# Patient Record
Sex: FEMALE | Race: WHITE | NOT HISPANIC OR LATINO
[De-identification: names, ages, dates, MRNs, and addresses within clinical notes are randomized per-mention and may not be internally consistent; named-entity substitution may affect disease eponyms.]

---

## 2021-01-07 ENCOUNTER — APPOINTMENT (OUTPATIENT)
Dept: SURGERY | Facility: CLINIC | Age: 71
End: 2021-01-07
Payer: MEDICARE

## 2021-01-07 VITALS
OXYGEN SATURATION: 96 % | SYSTOLIC BLOOD PRESSURE: 114 MMHG | RESPIRATION RATE: 14 BRPM | TEMPERATURE: 98.2 F | DIASTOLIC BLOOD PRESSURE: 78 MMHG | HEART RATE: 87 BPM

## 2021-01-07 PROCEDURE — 99072 ADDL SUPL MATRL&STAF TM PHE: CPT

## 2021-01-07 PROCEDURE — 99204 OFFICE O/P NEW MOD 45 MIN: CPT

## 2021-01-07 NOTE — ASSESSMENT
[FreeTextEntry1] : Ms. HURT is a 70 year old woman with admission in December 2020 to [place] for cholecystitis s/p cholecystostomy tube placement ~4 weeks ago. We discussed the risks/benefits of laparoscopic cholecystectomy. We also discussed, at length, the nonoperative management of cholecystitis and the procedure for possibly removing a cholecystostomy tube at least 6-8 weeks after placement. We discussed the importance of cardiology evaluation.

## 2021-01-07 NOTE — PLAN
[FreeTextEntry1] : No emergent surgical intervention indicated\par Cardiology referral provided\par Pt to return to office after cardiology assessment

## 2021-01-07 NOTE — HISTORY OF PRESENT ILLNESS
[de-identified] : Ms. HURT is a 70 year old woman with admission in December 2020 to [place] for cholecystitis s/p cholecystostomy tube placement ~4 weeks ago. The patient reports that she presented to the hospital due to severe pain, was diagnosed with a gallbladder infection, and a tube was placed. Per patient, she was deemed too high risk for surgery due to history of diastolic heart failure. Pt denies pain since discharge. Denies pain today. Denies recent fever/chills/nausea/emesis. Tolerating diet. \par \par Pt does not have a cardiologist. Denies recent cardiology evaluation.

## 2021-01-07 NOTE — PHYSICAL EXAM
[No Rash or Lesion] : No rash or lesion [Alert] : alert [Oriented to Person] : oriented to person [Oriented to Place] : oriented to place [Oriented to Time] : oriented to time [Calm] : calm [JVD] : no jugular venous distention  [de-identified] : No acute distress [de-identified] : No respiratory distress [de-identified] : Regular rate [de-identified] : soft, nontender. no rebound or guarding. cholecystostomy tube in place with bilious fluid in bag, insertion site clean [de-identified] : wheelchair bound

## 2021-01-13 DIAGNOSIS — Z86.39 PERSONAL HISTORY OF OTHER ENDOCRINE, NUTRITIONAL AND METABOLIC DISEASE: ICD-10-CM

## 2021-01-13 DIAGNOSIS — Z86.73 PERSONAL HISTORY OF TRANSIENT ISCHEMIC ATTACK (TIA), AND CEREBRAL INFARCTION W/OUT RESIDUAL DEFICITS: ICD-10-CM

## 2021-01-13 DIAGNOSIS — Z86.79 PERSONAL HISTORY OF OTHER DISEASES OF THE CIRCULATORY SYSTEM: ICD-10-CM

## 2021-01-13 DIAGNOSIS — Z87.09 PERSONAL HISTORY OF OTHER DISEASES OF THE RESPIRATORY SYSTEM: ICD-10-CM

## 2021-01-13 DIAGNOSIS — Z82.49 FAMILY HISTORY OF ISCHEMIC HEART DISEASE AND OTHER DISEASES OF THE CIRCULATORY SYSTEM: ICD-10-CM

## 2021-01-13 DIAGNOSIS — Z83.49 FAMILY HISTORY OF OTHER ENDOCRINE, NUTRITIONAL AND METABOLIC DISEASES: ICD-10-CM

## 2021-01-13 DIAGNOSIS — K81.9 CHOLECYSTITIS, UNSPECIFIED: ICD-10-CM

## 2021-01-13 DIAGNOSIS — Z80.8 FAMILY HISTORY OF MALIGNANT NEOPLASM OF OTHER ORGANS OR SYSTEMS: ICD-10-CM

## 2021-01-13 DIAGNOSIS — Z82.3 FAMILY HISTORY OF STROKE: ICD-10-CM

## 2021-01-13 DIAGNOSIS — Z78.9 OTHER SPECIFIED HEALTH STATUS: ICD-10-CM

## 2021-01-14 DIAGNOSIS — G43.909 MIGRAINE, UNSPECIFIED, NOT INTRACTABLE, W/OUT STATUS MIGRAINOSUS: ICD-10-CM

## 2021-01-14 DIAGNOSIS — Z78.9 OTHER SPECIFIED HEALTH STATUS: ICD-10-CM

## 2021-01-15 ENCOUNTER — APPOINTMENT (OUTPATIENT)
Dept: OBGYN | Facility: CLINIC | Age: 71
End: 2021-01-15
Payer: MEDICARE

## 2021-01-15 VITALS — WEIGHT: 172 LBS | BODY MASS INDEX: 33.77 KG/M2 | RESPIRATION RATE: 18 BRPM | OXYGEN SATURATION: 98 % | HEIGHT: 60 IN

## 2021-01-15 PROCEDURE — 99204 OFFICE O/P NEW MOD 45 MIN: CPT

## 2021-01-15 PROCEDURE — 99072 ADDL SUPL MATRL&STAF TM PHE: CPT

## 2021-01-15 NOTE — PLAN
[FreeTextEntry1] : d/w pt and daughter findings and that this is cancer until otherwise ruled out. advised that the most prudent next step would be to see gynonc. will make appointment. pts daughter stated that she was told at [facility] that there is nothing to worry about

## 2021-01-15 NOTE — PHYSICAL EXAM
[Appropriately responsive] : appropriately responsive [Alert] : alert [No Acute Distress] : no acute distress [No Lymphadenopathy] : no lymphadenopathy [Regular Rate Rhythm] : regular rate rhythm [No Murmurs] : no murmurs [Clear to Auscultation B/L] : clear to auscultation bilaterally [Soft] : soft [Non-tender] : non-tender [Non-distended] : non-distended [No HSM] : No HSM [No Lesions] : no lesions [No Mass] : no mass [Oriented x3] : oriented x3 [FreeTextEntry7] : gb drain

## 2021-01-15 NOTE — HISTORY OF PRESENT ILLNESS
[FreeTextEntry1] : pt presents after being worked up at [facility] for pmb. pt had emb which showed predominantly clot with very minute fragments of benign endometrium with breakdown. ct shows an enlarged uterus with an irregularly shaped mass. sono showed endometrium measuring 13mm

## 2021-01-16 ENCOUNTER — NON-APPOINTMENT (OUTPATIENT)
Age: 71
End: 2021-01-16

## 2021-01-19 ENCOUNTER — NON-APPOINTMENT (OUTPATIENT)
Age: 71
End: 2021-01-19

## 2021-01-21 ENCOUNTER — NON-APPOINTMENT (OUTPATIENT)
Age: 71
End: 2021-01-21

## 2021-01-21 ENCOUNTER — APPOINTMENT (OUTPATIENT)
Dept: GYNECOLOGIC ONCOLOGY | Facility: CLINIC | Age: 71
End: 2021-01-21
Payer: MEDICARE

## 2021-01-21 DIAGNOSIS — Z80.3 FAMILY HISTORY OF MALIGNANT NEOPLASM OF BREAST: ICD-10-CM

## 2021-01-21 DIAGNOSIS — Z80.0 FAMILY HISTORY OF MALIGNANT NEOPLASM OF DIGESTIVE ORGANS: ICD-10-CM

## 2021-01-21 PROCEDURE — 99072 ADDL SUPL MATRL&STAF TM PHE: CPT

## 2021-01-21 PROCEDURE — 99204 OFFICE O/P NEW MOD 45 MIN: CPT | Mod: 25

## 2021-01-21 PROCEDURE — 76857 US EXAM PELVIC LIMITED: CPT | Mod: 59

## 2021-01-21 PROCEDURE — 76830 TRANSVAGINAL US NON-OB: CPT | Mod: 59

## 2021-01-21 PROCEDURE — 58100 BIOPSY OF UTERUS LINING: CPT | Mod: 59

## 2021-01-21 NOTE — ASSESSMENT
[FreeTextEntry1] : 71yo female with multiple comorbidities, obesity with PMB, thickened endometrial lining and recent benign endometrial biopsy. Endometrial biopsy repeated today. Discussed with patient and daughter that I suspect an endometrial cancer and due to her comorbidities, she is not a good surgical candidate. Will start patient on Megace 40mg 2tabs BID. Discussed the need for possible radiation depending on biopsy results and response to Megace.

## 2021-01-21 NOTE — END OF VISIT
[FreeTextEntry3] : Written by Cindi DIETZ, acting as a scribe for Dr. Esme Saeed.\par This note accurately reflects the work and decisions made by me.\par

## 2021-01-21 NOTE — PHYSICAL EXAM
[Capable of only limited self care, confined to bed or chair more than 50% of waking hours] : Status 3- Capable of only limited self care, confined to bed or chair more than 50% of waking hours [Abnormal] : Abdomen: Abnormal [Obese] : obese [Normal] : Uterus: Normal size, no tenderness, no masses [de-identified] : edema of LE's -chronic  [de-identified] : moderate blood  [de-identified] : Patient was interviewed and examined with chaperone present. Name of chaperone: Cindi Travis

## 2021-01-21 NOTE — HISTORY OF PRESENT ILLNESS
[FreeTextEntry1] : This 71yo ,  x 4 LMP at 40 referred by Dr. Pete for PMB. Pt was admitted to [facility] hospital twice in Dec 2020 for n/v, abdominal distension and was found to have cholecystitis s/p IV antbiotics and drain placement with IR. She is following up with general surgery. During her admission, she developed vaginal bleeding and a pelvic US on 20 is limited but showed a thickened endometrial lining of 13mm, 10.9cm AV uterus. Ctscan on 20 showed enlarged uterus compressing the rectum, with an irregularly shaped enhancing mass between the distal vagina/cervix and uterus measuring 4.6 x 3.5cm with scattered cystic areas, no lymphadenopathy.  She was given Megace 40mg to control the bleeding while hospitalized but is no longer taking it. Endometrial biopsy on 20 revealed predominantly clot with very minute fragments of benign endometrium with breakdown. Pt reports the vag bleeding began in  intermittently and now is persistent, going through approx 5 pads a day. Admits to cramping. She is anemic.  \par \par PMHx of HTN/HLD, obesity, CVA with right sided weakness, carotid stenosis, diastolic CHF. \par \par Patient lives at home with her daughter and  and is mostly wheelchair bound. She Is able to walk slowly with a walker but with great difficulty. Most of history obtained from daughter and records.  \par \par Pap smear-5-10 years ago-reports wnl \par [id number] wnl \par [id number] wnl \par DEXA-few years ago-wnl \par

## 2021-01-21 NOTE — CHIEF COMPLAINT
[FreeTextEntry1] : [facility]\par \par [facility] [phone] at [address] [place] [id number]\par

## 2021-01-21 NOTE — PROCEDURE
[Endometrial Biopsy] : an endometrial biopsy [Postmenopausal Bleeding] : postmenopausal bleeding [Thickened Endometrium] : thickened endometrium [Patient] : the patient [Yes] : the specimen was sent to pathology [Verbal Consent] : verbal consent was obtained prior to the procedure and is detailed in the patient's record [No Complications] : none [Tolerated Well] : the patient tolerated the procedure well

## 2021-02-05 ENCOUNTER — APPOINTMENT (OUTPATIENT)
Dept: GYNECOLOGIC ONCOLOGY | Facility: CLINIC | Age: 71
End: 2021-02-05
Payer: MEDICARE

## 2021-02-05 LAB — CORE LAB BIOPSY: NORMAL

## 2021-02-05 PROCEDURE — 99441: CPT | Mod: 95

## 2021-02-05 NOTE — ASSESSMENT
[FreeTextEntry1] : Pt is a 69 yo with multiple comorbidities and poor functional status (ECOG3) with PMB and uterine/cervical mass. Physical exam with no evidence of cervical mass. Concern for endometrial cancer, but endometrial biopsy negative for evidence of malignancy. She was started on empiric Megace 40 mg PO BID with patient satisfied. She has had no further bleeding and only some cramping. We will continue current management and re-evaluate in 3 months or prior if she has any concerns.

## 2021-02-05 NOTE — END OF VISIT
[FreeTextEntry3] : follow up in 3 months or prior if needed for bleeding [Time Spent: ___ minutes] : I have spent [unfilled] minutes of time on the encounter.

## 2021-02-05 NOTE — HISTORY OF PRESENT ILLNESS
[Home] : at home, [unfilled] , at the time of the visit. [Other Location: e.g. Home (Enter Location, City,State)___] : at [unfilled] [Verbal consent obtained from patient] : the patient, [unfilled] [FreeTextEntry1] : Pt is a 71 yo with multiple co-morbidities making her a poor surgical candidate. She had PMB and a CT scan showing an anelarged uterus compressing the rectum with irregularly shaped mass in vagina/cervix. The patient is wheel chair bound with limited mobility and exam in office with normal cervix externally. Endometrial biopsy was attempted with scant tissue and blood. She was also started on Megace 40 mg PO BID for presummed hyperplasia/malignancy given that we would not recommend definitive surgical management. She presents for follow up on endometrial biopsy and discussion of management.\par \par She reports no further bleeding. She has some cramping with Megace but she is overall very satisfied. No nausea/vomiting, no fevers, chills.

## 2021-02-09 ENCOUNTER — APPOINTMENT (OUTPATIENT)
Dept: INTERVENTIONAL RADIOLOGY/VASCULAR | Facility: CLINIC | Age: 71
End: 2021-02-09
Payer: MEDICARE

## 2021-03-02 ENCOUNTER — APPOINTMENT (OUTPATIENT)
Dept: INTERVENTIONAL RADIOLOGY/VASCULAR | Facility: CLINIC | Age: 71
End: 2021-03-02
Payer: MEDICARE

## 2021-03-02 ENCOUNTER — OUTPATIENT (OUTPATIENT)
Dept: OUTPATIENT SERVICES | Facility: HOSPITAL | Age: 71
LOS: 1 days | End: 2021-03-02

## 2021-03-02 ENCOUNTER — RESULT REVIEW (OUTPATIENT)
Age: 71
End: 2021-03-02

## 2021-03-02 DIAGNOSIS — K81.9 CHOLECYSTITIS, UNSPECIFIED: ICD-10-CM

## 2021-03-02 PROCEDURE — 47531 INJECTION FOR CHOLANGIOGRAM: CPT

## 2021-03-05 ENCOUNTER — NON-APPOINTMENT (OUTPATIENT)
Age: 71
End: 2021-03-05

## 2021-03-05 ENCOUNTER — APPOINTMENT (OUTPATIENT)
Dept: CARDIOLOGY | Facility: CLINIC | Age: 71
End: 2021-03-05
Payer: MEDICARE

## 2021-03-05 VITALS
HEART RATE: 105 BPM | SYSTOLIC BLOOD PRESSURE: 112 MMHG | OXYGEN SATURATION: 98 % | WEIGHT: 175 LBS | BODY MASS INDEX: 34.36 KG/M2 | DIASTOLIC BLOOD PRESSURE: 72 MMHG | TEMPERATURE: 98.6 F | HEIGHT: 60 IN

## 2021-03-05 VITALS — SYSTOLIC BLOOD PRESSURE: 118 MMHG | DIASTOLIC BLOOD PRESSURE: 70 MMHG

## 2021-03-05 DIAGNOSIS — Z87.898 PERSONAL HISTORY OF OTHER SPECIFIED CONDITIONS: ICD-10-CM

## 2021-03-05 DIAGNOSIS — Z01.810 ENCOUNTER FOR PREPROCEDURAL CARDIOVASCULAR EXAMINATION: ICD-10-CM

## 2021-03-05 PROCEDURE — 99072 ADDL SUPL MATRL&STAF TM PHE: CPT

## 2021-03-05 PROCEDURE — 99204 OFFICE O/P NEW MOD 45 MIN: CPT

## 2021-03-05 PROCEDURE — 93000 ELECTROCARDIOGRAM COMPLETE: CPT

## 2021-03-05 RX ORDER — ADHESIVE TAPE 3"X 2.3 YD
50 MCG TAPE, NON-MEDICATED TOPICAL
Refills: 0 | Status: ACTIVE | COMMUNITY

## 2021-03-05 RX ORDER — ALBUTEROL 90 MCG
AEROSOL (GRAM) INHALATION
Refills: 0 | Status: ACTIVE | COMMUNITY

## 2021-03-05 NOTE — PHYSICAL EXAM
[General Appearance - Well Developed] : well developed [General Appearance - Well Nourished] : well nourished [Normal Conjunctiva] : the conjunctiva exhibited no abnormalities [Heart Rate And Rhythm] : heart rate and rhythm were normal [Heart Sounds] : normal S1 and S2 [Murmurs] : no murmurs present [Respiration, Rhythm And Depth] : normal respiratory rhythm and effort [Auscultation Breath Sounds / Voice Sounds] : lungs were clear to auscultation bilaterally [Bowel Sounds] : normal bowel sounds [Abdomen Soft] : soft [FreeTextEntry1] : Used Wheel chair [Cyanosis, Localized] : no localized cyanosis [] : no rash

## 2021-03-05 NOTE — ASSESSMENT
[FreeTextEntry1] : Sinus  Rhythm \par -Prominent R(V1) -nonspecific. \par  -Nonspecific ST depression   +   Negative T-waves. \par \par ABNORMAL \par \par \par Assessment:\par 1.  Exertional dyspnea\par 2.  Chest pain\par 3.  History of PAF\par 4.  History of a stroke with residual right-sided weakness\par 5.  History of diastolic CHF\par 6.  Hypertension/hyperlipidemia and other problems as noted\par 7.  Gallbladder disease-needs preoperative cardiac assessment\par \par Assessment:\par 1.  Echocardiogram\par 2.  Pharmacological nuclear stress test\par 3.  Event monitor\par 4.  Follow-up after testing\par \par \par Patient's perioperative cardiac risk assessment to be addressed after the echocardiogram and a stress test.\par \par

## 2021-03-05 NOTE — HISTORY OF PRESENT ILLNESS
[FreeTextEntry1] : Patient is a 70-year-old  female with a history of diastolic CHF, hypertension, hyperlipidemia, obesity, history of poliomyelitis history of a stroke and she has a uterine mass.  Patient has ongoing gallbladder problem.  Patient was admitted to [facility] in December 2020.  Patient has not seen a cardiologist.  Patient has a history of atrial fibrillation but she is not any oral anticoagulation.  Patient has been on Plavix since she had a stroke.  Patient's stroke has affected the right side of the body which is also the same side that she had polio.\par \par Patient presents for a preoperative cardiac assessment for abdominal surgery for gallbladder problem.  Patient has exertional dyspnea with less than 10 steps.  Patient is confined to wheelchair.  Patient also complains of chest tightness in the center of the chest.  Patient is having shortness of breath and chest pain for several months.  Patient denies orthopnea, PND or leg edema.  Patient denies any palpitations.\par \par \par Patient has no prior history of CAD or myocardial infarction.  No history of prior cardiac work-up like a stress test or a cardiac catheterization.  At this time I do not have any of her [facility] records.

## 2021-04-01 ENCOUNTER — APPOINTMENT (OUTPATIENT)
Dept: CARDIOLOGY | Facility: CLINIC | Age: 71
End: 2021-04-01
Payer: MEDICARE

## 2021-04-01 PROCEDURE — 93306 TTE W/DOPPLER COMPLETE: CPT

## 2021-04-01 PROCEDURE — 93015 CV STRESS TEST SUPVJ I&R: CPT

## 2021-04-01 PROCEDURE — 78452 HT MUSCLE IMAGE SPECT MULT: CPT

## 2021-04-01 PROCEDURE — 99072 ADDL SUPL MATRL&STAF TM PHE: CPT

## 2021-04-01 PROCEDURE — A9500: CPT

## 2021-04-05 ENCOUNTER — APPOINTMENT (OUTPATIENT)
Dept: CARDIOLOGY | Facility: CLINIC | Age: 71
End: 2021-04-05
Payer: MEDICARE

## 2021-04-05 ENCOUNTER — APPOINTMENT (OUTPATIENT)
Dept: PULMONOLOGY | Facility: CLINIC | Age: 71
End: 2021-04-05
Payer: MEDICARE

## 2021-04-05 VITALS
HEIGHT: 60 IN | TEMPERATURE: 97.9 F | HEART RATE: 89 BPM | RESPIRATION RATE: 16 BRPM | SYSTOLIC BLOOD PRESSURE: 140 MMHG | DIASTOLIC BLOOD PRESSURE: 83 MMHG | WEIGHT: 179 LBS | OXYGEN SATURATION: 98 % | BODY MASS INDEX: 35.14 KG/M2

## 2021-04-05 VITALS
HEART RATE: 85 BPM | SYSTOLIC BLOOD PRESSURE: 140 MMHG | OXYGEN SATURATION: 95 % | TEMPERATURE: 97.4 F | DIASTOLIC BLOOD PRESSURE: 70 MMHG

## 2021-04-05 DIAGNOSIS — G47.33 OBSTRUCTIVE SLEEP APNEA (ADULT) (PEDIATRIC): ICD-10-CM

## 2021-04-05 DIAGNOSIS — R07.89 OTHER CHEST PAIN: ICD-10-CM

## 2021-04-05 PROCEDURE — 99072 ADDL SUPL MATRL&STAF TM PHE: CPT

## 2021-04-05 PROCEDURE — 99204 OFFICE O/P NEW MOD 45 MIN: CPT

## 2021-04-05 PROCEDURE — 99214 OFFICE O/P EST MOD 30 MIN: CPT

## 2021-04-05 NOTE — REASON FOR VISIT
[Follow-Up - Clinic] : a clinic follow-up of [Chest Pain] : chest pain [Dyspnea] : dyspnea [Family Member] : family member

## 2021-04-08 ENCOUNTER — APPOINTMENT (OUTPATIENT)
Dept: SURGERY | Facility: CLINIC | Age: 71
End: 2021-04-08
Payer: MEDICARE

## 2021-04-08 VITALS
SYSTOLIC BLOOD PRESSURE: 111 MMHG | DIASTOLIC BLOOD PRESSURE: 69 MMHG | OXYGEN SATURATION: 95 % | HEART RATE: 86 BPM | TEMPERATURE: 97.4 F

## 2021-04-08 PROCEDURE — 99214 OFFICE O/P EST MOD 30 MIN: CPT

## 2021-04-08 PROCEDURE — 99072 ADDL SUPL MATRL&STAF TM PHE: CPT

## 2021-04-09 ENCOUNTER — INPATIENT (INPATIENT)
Facility: HOSPITAL | Age: 71
LOS: 1 days | Discharge: HOME | DRG: 920 | End: 2021-04-11
Attending: SURGERY | Admitting: SURGERY
Payer: MEDICARE

## 2021-04-09 ENCOUNTER — NON-APPOINTMENT (OUTPATIENT)
Age: 71
End: 2021-04-09

## 2021-04-09 VITALS
HEART RATE: 102 BPM | DIASTOLIC BLOOD PRESSURE: 100 MMHG | WEIGHT: 179.02 LBS | SYSTOLIC BLOOD PRESSURE: 202 MMHG | RESPIRATION RATE: 20 BRPM | TEMPERATURE: 98 F | OXYGEN SATURATION: 99 % | HEIGHT: 62 IN

## 2021-04-09 DIAGNOSIS — K82.3 FISTULA OF GALLBLADDER: ICD-10-CM

## 2021-04-09 LAB
ALBUMIN SERPL ELPH-MCNC: 3.9 G/DL — SIGNIFICANT CHANGE UP (ref 3.3–5.2)
ALP SERPL-CCNC: 117 U/L — SIGNIFICANT CHANGE UP (ref 40–120)
ALT FLD-CCNC: 12 U/L — SIGNIFICANT CHANGE UP
ANION GAP SERPL CALC-SCNC: 15 MMOL/L — SIGNIFICANT CHANGE UP (ref 5–17)
APTT BLD: 31 SEC — SIGNIFICANT CHANGE UP (ref 27.5–35.5)
AST SERPL-CCNC: 14 U/L — SIGNIFICANT CHANGE UP
BASOPHILS # BLD AUTO: 0.02 K/UL — SIGNIFICANT CHANGE UP (ref 0–0.2)
BASOPHILS NFR BLD AUTO: 0.1 % — SIGNIFICANT CHANGE UP (ref 0–2)
BILIRUB SERPL-MCNC: 0.4 MG/DL — SIGNIFICANT CHANGE UP (ref 0.4–2)
BLD GP AB SCN SERPL QL: SIGNIFICANT CHANGE UP
BUN SERPL-MCNC: 10 MG/DL — SIGNIFICANT CHANGE UP (ref 8–20)
CALCIUM SERPL-MCNC: 9.1 MG/DL — SIGNIFICANT CHANGE UP (ref 8.6–10.2)
CHLORIDE SERPL-SCNC: 98 MMOL/L — SIGNIFICANT CHANGE UP (ref 98–107)
CO2 SERPL-SCNC: 24 MMOL/L — SIGNIFICANT CHANGE UP (ref 22–29)
CREAT SERPL-MCNC: 0.69 MG/DL — SIGNIFICANT CHANGE UP (ref 0.5–1.3)
EOSINOPHIL # BLD AUTO: 0.13 K/UL — SIGNIFICANT CHANGE UP (ref 0–0.5)
EOSINOPHIL NFR BLD AUTO: 0.9 % — SIGNIFICANT CHANGE UP (ref 0–6)
GLUCOSE SERPL-MCNC: 99 MG/DL — SIGNIFICANT CHANGE UP (ref 70–99)
HCT VFR BLD CALC: 39.2 % — SIGNIFICANT CHANGE UP (ref 34.5–45)
HGB BLD-MCNC: 12.6 G/DL — SIGNIFICANT CHANGE UP (ref 11.5–15.5)
IMM GRANULOCYTES NFR BLD AUTO: 0.7 % — SIGNIFICANT CHANGE UP (ref 0–1.5)
INR BLD: 1.08 RATIO — SIGNIFICANT CHANGE UP (ref 0.88–1.16)
LACTATE BLDV-MCNC: 1.5 MMOL/L — SIGNIFICANT CHANGE UP (ref 0.5–2)
LIDOCAIN IGE QN: 21 U/L — LOW (ref 22–51)
LYMPHOCYTES # BLD AUTO: 1.8 K/UL — SIGNIFICANT CHANGE UP (ref 1–3.3)
LYMPHOCYTES # BLD AUTO: 13.1 % — SIGNIFICANT CHANGE UP (ref 13–44)
MCHC RBC-ENTMCNC: 27.6 PG — SIGNIFICANT CHANGE UP (ref 27–34)
MCHC RBC-ENTMCNC: 32.1 GM/DL — SIGNIFICANT CHANGE UP (ref 32–36)
MCV RBC AUTO: 85.8 FL — SIGNIFICANT CHANGE UP (ref 80–100)
MONOCYTES # BLD AUTO: 0.76 K/UL — SIGNIFICANT CHANGE UP (ref 0–0.9)
MONOCYTES NFR BLD AUTO: 5.5 % — SIGNIFICANT CHANGE UP (ref 2–14)
NEUTROPHILS # BLD AUTO: 10.96 K/UL — HIGH (ref 1.8–7.4)
NEUTROPHILS NFR BLD AUTO: 79.7 % — HIGH (ref 43–77)
PLATELET # BLD AUTO: 545 K/UL — HIGH (ref 150–400)
POTASSIUM SERPL-MCNC: 4 MMOL/L — SIGNIFICANT CHANGE UP (ref 3.5–5.3)
POTASSIUM SERPL-SCNC: 4 MMOL/L — SIGNIFICANT CHANGE UP (ref 3.5–5.3)
PROT SERPL-MCNC: 8 G/DL — SIGNIFICANT CHANGE UP (ref 6.6–8.7)
PROTHROM AB SERPL-ACNC: 12.5 SEC — SIGNIFICANT CHANGE UP (ref 10.6–13.6)
RBC # BLD: 4.57 M/UL — SIGNIFICANT CHANGE UP (ref 3.8–5.2)
RBC # FLD: 13.1 % — SIGNIFICANT CHANGE UP (ref 10.3–14.5)
SODIUM SERPL-SCNC: 137 MMOL/L — SIGNIFICANT CHANGE UP (ref 135–145)
WBC # BLD: 13.76 K/UL — HIGH (ref 3.8–10.5)
WBC # FLD AUTO: 13.76 K/UL — HIGH (ref 3.8–10.5)

## 2021-04-09 PROCEDURE — 71045 X-RAY EXAM CHEST 1 VIEW: CPT | Mod: 26

## 2021-04-09 PROCEDURE — 93010 ELECTROCARDIOGRAM REPORT: CPT

## 2021-04-09 PROCEDURE — 99232 SBSQ HOSP IP/OBS MODERATE 35: CPT

## 2021-04-09 PROCEDURE — 99285 EMERGENCY DEPT VISIT HI MDM: CPT

## 2021-04-09 PROCEDURE — 74177 CT ABD & PELVIS W/CONTRAST: CPT | Mod: 26,ME

## 2021-04-09 PROCEDURE — G1004: CPT

## 2021-04-09 RX ORDER — PIPERACILLIN AND TAZOBACTAM 4; .5 G/20ML; G/20ML
3.38 INJECTION, POWDER, LYOPHILIZED, FOR SOLUTION INTRAVENOUS ONCE
Refills: 0 | Status: COMPLETED | OUTPATIENT
Start: 2021-04-09 | End: 2021-04-09

## 2021-04-09 RX ORDER — PANTOPRAZOLE SODIUM 20 MG/1
40 TABLET, DELAYED RELEASE ORAL
Refills: 0 | Status: DISCONTINUED | OUTPATIENT
Start: 2021-04-09 | End: 2021-04-11

## 2021-04-09 RX ORDER — SODIUM CHLORIDE 9 MG/ML
1000 INJECTION INTRAMUSCULAR; INTRAVENOUS; SUBCUTANEOUS ONCE
Refills: 0 | Status: COMPLETED | OUTPATIENT
Start: 2021-04-09 | End: 2021-04-09

## 2021-04-09 RX ORDER — ONDANSETRON 8 MG/1
4 TABLET, FILM COATED ORAL EVERY 6 HOURS
Refills: 0 | Status: DISCONTINUED | OUTPATIENT
Start: 2021-04-09 | End: 2021-04-11

## 2021-04-09 RX ORDER — HEPARIN SODIUM 5000 [USP'U]/ML
5000 INJECTION INTRAVENOUS; SUBCUTANEOUS ONCE
Refills: 0 | Status: COMPLETED | OUTPATIENT
Start: 2021-04-09 | End: 2021-04-09

## 2021-04-09 RX ORDER — PIPERACILLIN AND TAZOBACTAM 4; .5 G/20ML; G/20ML
3.38 INJECTION, POWDER, LYOPHILIZED, FOR SOLUTION INTRAVENOUS EVERY 8 HOURS
Refills: 0 | Status: DISCONTINUED | OUTPATIENT
Start: 2021-04-09 | End: 2021-04-11

## 2021-04-09 RX ORDER — METOPROLOL TARTRATE 50 MG
100 TABLET ORAL
Refills: 0 | Status: DISCONTINUED | OUTPATIENT
Start: 2021-04-09 | End: 2021-04-11

## 2021-04-09 RX ORDER — SODIUM CHLORIDE 9 MG/ML
1000 INJECTION, SOLUTION INTRAVENOUS
Refills: 0 | Status: DISCONTINUED | OUTPATIENT
Start: 2021-04-09 | End: 2021-04-10

## 2021-04-09 RX ORDER — ALBUTEROL 90 UG/1
2.5 AEROSOL, METERED ORAL EVERY 6 HOURS
Refills: 0 | Status: DISCONTINUED | OUTPATIENT
Start: 2021-04-09 | End: 2021-04-11

## 2021-04-09 RX ORDER — AMLODIPINE BESYLATE 2.5 MG/1
2.5 TABLET ORAL DAILY
Refills: 0 | Status: DISCONTINUED | OUTPATIENT
Start: 2021-04-09 | End: 2021-04-11

## 2021-04-09 RX ORDER — LEVOTHYROXINE SODIUM 125 MCG
75 TABLET ORAL DAILY
Refills: 0 | Status: DISCONTINUED | OUTPATIENT
Start: 2021-04-09 | End: 2021-04-11

## 2021-04-09 RX ADMIN — HEPARIN SODIUM 5000 UNIT(S): 5000 INJECTION INTRAVENOUS; SUBCUTANEOUS at 21:52

## 2021-04-09 RX ADMIN — PIPERACILLIN AND TAZOBACTAM 200 GRAM(S): 4; .5 INJECTION, POWDER, LYOPHILIZED, FOR SOLUTION INTRAVENOUS at 21:27

## 2021-04-09 RX ADMIN — SODIUM CHLORIDE 75 MILLILITER(S): 9 INJECTION, SOLUTION INTRAVENOUS at 23:45

## 2021-04-09 RX ADMIN — SODIUM CHLORIDE 1000 MILLILITER(S): 9 INJECTION INTRAMUSCULAR; INTRAVENOUS; SUBCUTANEOUS at 20:50

## 2021-04-09 NOTE — H&P ADULT - ASSESSMENT
ASSESSMENT: Patient is a 70y old female with dislodged percutaneous cholecystostomy tube. CT scan with findings concerning for abscess. At length conversation had with patient and her daughter, due to risk for conversion to open procedure and associated technical difficulty of case decision was made to proceed with IR drainage rather than lap suzi.     PLAN:    - IR consulted for peructaneous cholecystostomy/drainage  - IV zosyn  - hold AC / antiplatelet  - Admit to surgery under Dr. Dougherty

## 2021-04-09 NOTE — CONSULT NOTE ADULT - SUBJECTIVE AND OBJECTIVE BOX
ACUTE CARE SURGERY CONSULT     HPI: 70y Female who presented to ED after her percutaneous cholecystostomy tube that she got placed at an outside facility fell yesterday morning. Since then her pain has increased mildly but she was concerned and decided to come to the ED. Denies any fever, chills, nausea, vomiting, chest pain, and sob.     ROS: 10-system review is otherwise negative except HPI above.      PAST MEDICAL & SURGICAL HISTORY:  Percutaneous cholecystostomy tube.   FAMILY HISTORY:    Family history not pertinent as reviewed with the patient.    SOCIAL HISTORY:  Denies any toxic habits    ALLERGIES: NKA No Known Allergies      HOME MEDICATIONS:       --------------------------------------------------------------------------------------------    PHYSICAL EXAM:   General: NAD, Lying in bed comfortably  Neuro: A+Ox3  HEENT: EOMI, PERRLA, MMM  Cardio: RRR  Resp: Non labored breathing on RA  GI/Abd: Soft, NT/ND, no rebound/guarding, no masses palpated. Percutaneous cholecystostomy tube site is c/d/i w/o erythema nor signs of infection.   Vascular: All 4 extremities warm and well perfused.   Pelvis: stable  Musculoskeletal: All 4 extremities moving spontaneously, no limitations, no spinal tenderness.  --------------------------------------------------------------------------------------------    LABS      CAPILLARY BLOOD GLUCOSE    --------------------------------------------------------------------------------------------  IMAGING  Pending.

## 2021-04-09 NOTE — PHYSICAL EXAM
[JVD] : no jugular venous distention  [No Rash or Lesion] : No rash or lesion [Alert] : alert [Oriented to Person] : oriented to person [Oriented to Place] : oriented to place [Oriented to Time] : oriented to time [Calm] : calm [de-identified] : No acute distress [de-identified] : No respiratory distress [de-identified] : Regular rate [de-identified] : soft, nontender. no rebound or guarding. cholecystostomy tube in place with bilious fluid in bag, insertion site clean [de-identified] : wheelchair bound

## 2021-04-09 NOTE — ED PROVIDER NOTE - OBJECTIVE STATEMENT
70yoF; with pmh signif for Cholelithiasis (s/p biliary drain1 month ago, fell out 2 days ago), HTN, HLD, CVA; now p/w abd pain--x1 days, ruq, cramping, associated with nausea. denies vomiting. c/o chills. denies fever. denies dysuria, frequency, urgency. denies cp/sob/palp. c/o right flank pain.  denies sick contacts. denies travel.  PMH:  Cholelithiasis (s/p biliary drain1 month ago, fell out 2 days ago), HTN, HLD, CVA  SOCIAL: No tobacco/illicit substance use

## 2021-04-09 NOTE — ED PROVIDER NOTE - PHYSICAL EXAMINATION
General:     NAD, well-nourished, well-appearing  Head:     NC/AT, EOMI, oral mucosa moist  Neck:     trachea midline  Lungs:     CTA b/l, no w/r/r  CVS:     S1S2, RRR, no m/g/r  Abd:     +BS, TTP @ RUQ, s/nd, no organomegaly  Ext:    2+ radial and pedal pulses, no c/c/e  Neuro: AAOx3, no sensory/motor deficits

## 2021-04-09 NOTE — ED ADULT TRIAGE NOTE - CHIEF COMPLAINT QUOTE
patient told to come to ED by Dr. Prieto for abdominal pain, recent hx of gallbladder drain placement. states drain was leaking earlier in the weak and now is having pain to drain site and abdomen that is not getting better. denies n/v/d, fever/chills,

## 2021-04-09 NOTE — ASSESSMENT
[FreeTextEntry1] : Ms. HURT is a 70 year old woman with admission in December 2020 to [place] for cholecystitis s/p cholecystostomy tube placement who has undergone cardiology evaluation and tube study since last visit. We also discussed the possibility that the cholecystostomy tube has not been functioning / not been draining the gallbladder since output decreased ~4 weeks ago. We again discussed, at length, the options of laparoscopic cholecystectomy and nonoperative management. We discussed that, with nonoperative management, the patient may or may not suffer another episode of right upper quadrant pain and cholecystitis. All questions were answered. At this time, the patient declines surgical intervention. I provided the patient and her daughter with my personal contact information and instructed them to contact me immediately and/or present to the ED if she suffers from right upper quadrant pain, nausea/emesis, fever/chills or other symptoms. The patient understands and agrees.

## 2021-04-09 NOTE — ED ADULT NURSE REASSESSMENT NOTE - NS ED NURSE REASSESS COMMENT FT1
Report received from off-going RN at 19:30, VSS, pt resting comfortably in stretcher. No s/s of apparent distress noted. Will continue to monitor at this time.

## 2021-04-09 NOTE — CONSULT NOTE ADULT - ASSESSMENT
ASSESSMENT: Patient is a 70y old female with self D/C'ed percutaneous cholecystostomy tube.   PLAN:    - f/u CT scan  - f/u labs  - Plan discussed with Attending, Dr. Dougherty

## 2021-04-09 NOTE — ED PROVIDER NOTE - NS ED ROS FT
Constitutional: (-) fever  (+)chills  (-)sweats  Eyes/ENT: (-)   Cardiovascular: (-) chest pain, (-) palpitations (-) edema   Respiratory: (-) cough, (-) shortness of breath   Gastrointestinal: (+)nausea  (-)vomiting, (-) diarrhea  (+) abdominal pain   :  (-)dysuria, (-)frequency, (-)urgency, (-)hematuria  Musculoskeletal: (-) neck pain, (-) back pain, (-) joint pain  Integumentary: (-) rash, (-) edema  Neurological: (-) headache, (-) altered mental status  (-)LOC

## 2021-04-09 NOTE — PLAN
[FreeTextEntry1] : No emergent surgical intervention indicated \par The patient understands and agrees that if she experiences pain, fever/chills/nausea/emesis or other symptoms she will contact me and/or present to the ED immediately.\par

## 2021-04-09 NOTE — HISTORY OF PRESENT ILLNESS
[de-identified] : Ms. HURT is a 70 year old woman with admission in December 2020 to [place] for cholecystitis s/p cholecystostomy tube placement who returns to office for followup after cardiology evaluation and after cholecystostomy tube study. Tube study displayed that cystic duct was obstructed. Pt reports that output from tube markedly decreased ~  ~4 weeks ago shortly after tube study was performed. Cholecystostomy tube fell out of patient's abdominal wall yesterday. Reports small amount of light-colored output each day (~5-10mL). Denies pain today. Denies fever/chills/nausea/emesis. Tolerating diet. \par \par Tube study: \par Findings:\par  film shows a core specimen catheter coiled in right upper quadrant. Contrast enhanced images show the gallbladder is full of multiple stones. The proximal portion of the cystic duct appears patent. The common duct is not clearly identified.

## 2021-04-09 NOTE — H&P ADULT - ATTENDING COMMENTS
Pt with recurrent RUQ pain after cholecystostomy tube fell out, imaging consistent with recurrent cholecystitis and fluid along tube tract  Admit, IV antibiotics, IR consulted for tube replacement  Resume diet and DVT ppx after IR procedure

## 2021-04-10 ENCOUNTER — TRANSCRIPTION ENCOUNTER (OUTPATIENT)
Age: 71
End: 2021-04-10

## 2021-04-10 LAB
ALBUMIN SERPL ELPH-MCNC: 3.7 G/DL — SIGNIFICANT CHANGE UP (ref 3.3–5.2)
ALP SERPL-CCNC: 115 U/L — SIGNIFICANT CHANGE UP (ref 40–120)
ALT FLD-CCNC: 11 U/L — SIGNIFICANT CHANGE UP
ANION GAP SERPL CALC-SCNC: 14 MMOL/L — SIGNIFICANT CHANGE UP (ref 5–17)
APTT BLD: 27.2 SEC — LOW (ref 27.5–35.5)
AST SERPL-CCNC: 10 U/L — SIGNIFICANT CHANGE UP
BASOPHILS # BLD AUTO: 0.03 K/UL — SIGNIFICANT CHANGE UP (ref 0–0.2)
BASOPHILS NFR BLD AUTO: 0.3 % — SIGNIFICANT CHANGE UP (ref 0–2)
BILIRUB SERPL-MCNC: 0.4 MG/DL — SIGNIFICANT CHANGE UP (ref 0.4–2)
BUN SERPL-MCNC: 10 MG/DL — SIGNIFICANT CHANGE UP (ref 8–20)
CALCIUM SERPL-MCNC: 8.9 MG/DL — SIGNIFICANT CHANGE UP (ref 8.6–10.2)
CHLORIDE SERPL-SCNC: 101 MMOL/L — SIGNIFICANT CHANGE UP (ref 98–107)
CO2 SERPL-SCNC: 23 MMOL/L — SIGNIFICANT CHANGE UP (ref 22–29)
CREAT SERPL-MCNC: 0.73 MG/DL — SIGNIFICANT CHANGE UP (ref 0.5–1.3)
EOSINOPHIL # BLD AUTO: 0.1 K/UL — SIGNIFICANT CHANGE UP (ref 0–0.5)
EOSINOPHIL NFR BLD AUTO: 1 % — SIGNIFICANT CHANGE UP (ref 0–6)
GLUCOSE SERPL-MCNC: 102 MG/DL — HIGH (ref 70–99)
HCT VFR BLD CALC: 38.6 % — SIGNIFICANT CHANGE UP (ref 34.5–45)
HGB BLD-MCNC: 12.2 G/DL — SIGNIFICANT CHANGE UP (ref 11.5–15.5)
IMM GRANULOCYTES NFR BLD AUTO: 0.5 % — SIGNIFICANT CHANGE UP (ref 0–1.5)
INR BLD: 1.05 RATIO — SIGNIFICANT CHANGE UP (ref 0.88–1.16)
LYMPHOCYTES # BLD AUTO: 2.27 K/UL — SIGNIFICANT CHANGE UP (ref 1–3.3)
LYMPHOCYTES # BLD AUTO: 22.7 % — SIGNIFICANT CHANGE UP (ref 13–44)
MCHC RBC-ENTMCNC: 27.1 PG — SIGNIFICANT CHANGE UP (ref 27–34)
MCHC RBC-ENTMCNC: 31.6 GM/DL — LOW (ref 32–36)
MCV RBC AUTO: 85.8 FL — SIGNIFICANT CHANGE UP (ref 80–100)
MONOCYTES # BLD AUTO: 0.8 K/UL — SIGNIFICANT CHANGE UP (ref 0–0.9)
MONOCYTES NFR BLD AUTO: 8 % — SIGNIFICANT CHANGE UP (ref 2–14)
NEUTROPHILS # BLD AUTO: 6.77 K/UL — SIGNIFICANT CHANGE UP (ref 1.8–7.4)
NEUTROPHILS NFR BLD AUTO: 67.5 % — SIGNIFICANT CHANGE UP (ref 43–77)
PLATELET # BLD AUTO: 517 K/UL — HIGH (ref 150–400)
POTASSIUM SERPL-MCNC: 3.7 MMOL/L — SIGNIFICANT CHANGE UP (ref 3.5–5.3)
POTASSIUM SERPL-SCNC: 3.7 MMOL/L — SIGNIFICANT CHANGE UP (ref 3.5–5.3)
PROT SERPL-MCNC: 7.6 G/DL — SIGNIFICANT CHANGE UP (ref 6.6–8.7)
PROTHROM AB SERPL-ACNC: 12.2 SEC — SIGNIFICANT CHANGE UP (ref 10.6–13.6)
RBC # BLD: 4.5 M/UL — SIGNIFICANT CHANGE UP (ref 3.8–5.2)
RBC # FLD: 13.3 % — SIGNIFICANT CHANGE UP (ref 10.3–14.5)
SARS-COV-2 RNA SPEC QL NAA+PROBE: SIGNIFICANT CHANGE UP
SODIUM SERPL-SCNC: 138 MMOL/L — SIGNIFICANT CHANGE UP (ref 135–145)
WBC # BLD: 10.02 K/UL — SIGNIFICANT CHANGE UP (ref 3.8–10.5)
WBC # FLD AUTO: 10.02 K/UL — SIGNIFICANT CHANGE UP (ref 3.8–10.5)

## 2021-04-10 PROCEDURE — 47536 EXCHANGE BILIARY DRG CATH: CPT

## 2021-04-10 PROCEDURE — 99232 SBSQ HOSP IP/OBS MODERATE 35: CPT

## 2021-04-10 RX ORDER — ENOXAPARIN SODIUM 100 MG/ML
40 INJECTION SUBCUTANEOUS DAILY
Refills: 0 | Status: DISCONTINUED | OUTPATIENT
Start: 2021-04-10 | End: 2021-04-11

## 2021-04-10 RX ADMIN — Medication 100 MILLIGRAM(S): at 06:28

## 2021-04-10 RX ADMIN — Medication 75 MICROGRAM(S): at 06:28

## 2021-04-10 RX ADMIN — Medication 0.2 MILLIGRAM(S): at 06:28

## 2021-04-10 RX ADMIN — PIPERACILLIN AND TAZOBACTAM 25 GRAM(S): 4; .5 INJECTION, POWDER, LYOPHILIZED, FOR SOLUTION INTRAVENOUS at 06:29

## 2021-04-10 RX ADMIN — PIPERACILLIN AND TAZOBACTAM 25 GRAM(S): 4; .5 INJECTION, POWDER, LYOPHILIZED, FOR SOLUTION INTRAVENOUS at 14:15

## 2021-04-10 RX ADMIN — PIPERACILLIN AND TAZOBACTAM 25 GRAM(S): 4; .5 INJECTION, POWDER, LYOPHILIZED, FOR SOLUTION INTRAVENOUS at 21:04

## 2021-04-10 RX ADMIN — AMLODIPINE BESYLATE 2.5 MILLIGRAM(S): 2.5 TABLET ORAL at 06:28

## 2021-04-10 RX ADMIN — PANTOPRAZOLE SODIUM 40 MILLIGRAM(S): 20 TABLET, DELAYED RELEASE ORAL at 06:28

## 2021-04-10 RX ADMIN — Medication 100 MILLIGRAM(S): at 17:27

## 2021-04-10 RX ADMIN — ENOXAPARIN SODIUM 40 MILLIGRAM(S): 100 INJECTION SUBCUTANEOUS at 23:05

## 2021-04-10 NOTE — CHART NOTE - NSCHARTNOTEFT_GEN_A_CORE
HPI/Interval Events: Patient now s/p IR exchange of percutaneous cholecystostomy tube. No acute intervening events. Patient feels significantly improved, with decrease in pain. Tolerating PO, voiding. No f/c/n/v, chest pain, SOB, urinary symptoms, hematochezia, melena.    MEDICATIONS  (STANDING):  ALBUTerol   0.5% 2.5 milliGRAM(s) Nebulizer every 6 hours  amLODIPine   Tablet 2.5 milliGRAM(s) Oral daily  cloNIDine 0.2 milliGRAM(s) Oral daily  enoxaparin Injectable 40 milliGRAM(s) SubCutaneous daily  lactated ringers. 1000 milliLiter(s) (75 mL/Hr) IV Continuous <Continuous>  levothyroxine 75 MICROGram(s) Oral daily  metoprolol tartrate 100 milliGRAM(s) Oral two times a day  pantoprazole    Tablet 40 milliGRAM(s) Oral before breakfast  piperacillin/tazobactam IVPB.. 3.375 Gram(s) IV Intermittent every 8 hours    MEDICATIONS  (PRN):  ondansetron Injectable 4 milliGRAM(s) IV Push every 6 hours PRN Nausea      Vital Signs Last 24 Hrs  T(C): 36.8 (10 Apr 2021 18:23), Max: 37.1 (10 Apr 2021 06:49)  T(F): 98.2 (10 Apr 2021 18:23), Max: 98.7 (10 Apr 2021 06:49)  HR: 84 (10 Apr 2021 18:23) (82 - 98)  BP: 156/79 (10 Apr 2021 18:23) (105/63 - 156/79)  BP(mean): --  RR: 18 (10 Apr 2021 18:23) (18 - 20)  SpO2: 98% (10 Apr 2021 18:23) (97% - 99%)    Gen: patient laying in bed, A&Ox3, NAD  HEENT: EOMI / PERRL b/l  Pulm: regular respiratory rate, no distress  CV: RRR  GI: Perc suzi tube site c/d/i, tube draining serosanguinous fluid. Abdomen soft, NTND  Neurological: no gross sensory / motor deficits  Ext: Warm, pink, no edema or lesions noted      I&O's Detail      LABS:                        12.2   10.02 )-----------( 517      ( 10 Apr 2021 06:54 )             38.6     04-10    138  |  101  |  10.0  ----------------------------<  102<H>  3.7   |  23.0  |  0.73    Ca    8.9      10 Apr 2021 06:54    TPro  7.6  /  Alb  3.7  /  TBili  0.4  /  DBili  x   /  AST  10  /  ALT  11  /  AlkPhos  115  04-10    PT/INR - ( 10 Apr 2021 06:54 )   PT: 12.2 sec;   INR: 1.05 ratio       A/P: 70y Female w/PMH of dislodged per suzi tube, now s/p exchange of IR tube. Patient noting significant improvement in pain post-procedure.    -Regular diet  -Continue Zosyn  -F/U AM Labs  -Possible d/c 4/11 if stable

## 2021-04-10 NOTE — PROGRESS NOTE ADULT - ASSESSMENT
Patient is a 70y old female with dislodged percutaneous cholecystostomy tube. CT scan with findings concerning for abscess. At length conversation had with patient and her daughter, due to risk for conversion to open procedure and associated technical difficulty of case decision was made to proceed with IR drainage rather than lap suzi.     PLAN:    - IR consulted for percutaneous cholecystostomy/drainage, plan for procedure today  - IV zosyn  - hold AC / antiplatelet, resume after procedure.   - Diet after procedure.

## 2021-04-11 ENCOUNTER — TRANSCRIPTION ENCOUNTER (OUTPATIENT)
Age: 71
End: 2021-04-11

## 2021-04-11 VITALS
HEART RATE: 85 BPM | SYSTOLIC BLOOD PRESSURE: 147 MMHG | OXYGEN SATURATION: 96 % | TEMPERATURE: 98 F | RESPIRATION RATE: 18 BRPM | DIASTOLIC BLOOD PRESSURE: 80 MMHG

## 2021-04-11 LAB
ALBUMIN SERPL ELPH-MCNC: 3.7 G/DL — SIGNIFICANT CHANGE UP (ref 3.3–5.2)
ALP SERPL-CCNC: 114 U/L — SIGNIFICANT CHANGE UP (ref 40–120)
ALT FLD-CCNC: 13 U/L — SIGNIFICANT CHANGE UP
ANION GAP SERPL CALC-SCNC: 19 MMOL/L — HIGH (ref 5–17)
AST SERPL-CCNC: 16 U/L — SIGNIFICANT CHANGE UP
BASOPHILS # BLD AUTO: 0.04 K/UL — SIGNIFICANT CHANGE UP (ref 0–0.2)
BASOPHILS NFR BLD AUTO: 0.4 % — SIGNIFICANT CHANGE UP (ref 0–2)
BILIRUB DIRECT SERPL-MCNC: 0.1 MG/DL — SIGNIFICANT CHANGE UP (ref 0–0.3)
BILIRUB INDIRECT FLD-MCNC: 0.3 MG/DL — SIGNIFICANT CHANGE UP (ref 0.2–1)
BILIRUB SERPL-MCNC: 0.5 MG/DL — SIGNIFICANT CHANGE UP (ref 0.4–2)
BUN SERPL-MCNC: 10 MG/DL — SIGNIFICANT CHANGE UP (ref 8–20)
CALCIUM SERPL-MCNC: 9.4 MG/DL — SIGNIFICANT CHANGE UP (ref 8.6–10.2)
CHLORIDE SERPL-SCNC: 98 MMOL/L — SIGNIFICANT CHANGE UP (ref 98–107)
CO2 SERPL-SCNC: 19 MMOL/L — LOW (ref 22–29)
COVID-19 SPIKE DOMAIN AB INTERP: NEGATIVE — SIGNIFICANT CHANGE UP
COVID-19 SPIKE DOMAIN ANTIBODY RESULT: 0.4 U/ML — SIGNIFICANT CHANGE UP
CREAT SERPL-MCNC: 0.73 MG/DL — SIGNIFICANT CHANGE UP (ref 0.5–1.3)
EOSINOPHIL # BLD AUTO: 0.08 K/UL — SIGNIFICANT CHANGE UP (ref 0–0.5)
EOSINOPHIL NFR BLD AUTO: 0.8 % — SIGNIFICANT CHANGE UP (ref 0–6)
GLUCOSE SERPL-MCNC: 97 MG/DL — SIGNIFICANT CHANGE UP (ref 70–99)
HCT VFR BLD CALC: 41.4 % — SIGNIFICANT CHANGE UP (ref 34.5–45)
HCV AB S/CO SERPL IA: 0.16 S/CO — SIGNIFICANT CHANGE UP (ref 0–0.99)
HCV AB SERPL-IMP: SIGNIFICANT CHANGE UP
HGB BLD-MCNC: 13.2 G/DL — SIGNIFICANT CHANGE UP (ref 11.5–15.5)
IMM GRANULOCYTES NFR BLD AUTO: 0.7 % — SIGNIFICANT CHANGE UP (ref 0–1.5)
LYMPHOCYTES # BLD AUTO: 2.18 K/UL — SIGNIFICANT CHANGE UP (ref 1–3.3)
LYMPHOCYTES # BLD AUTO: 20.6 % — SIGNIFICANT CHANGE UP (ref 13–44)
MAGNESIUM SERPL-MCNC: 2.3 MG/DL — SIGNIFICANT CHANGE UP (ref 1.8–2.6)
MCHC RBC-ENTMCNC: 27.4 PG — SIGNIFICANT CHANGE UP (ref 27–34)
MCHC RBC-ENTMCNC: 31.9 GM/DL — LOW (ref 32–36)
MCV RBC AUTO: 86.1 FL — SIGNIFICANT CHANGE UP (ref 80–100)
MONOCYTES # BLD AUTO: 0.78 K/UL — SIGNIFICANT CHANGE UP (ref 0–0.9)
MONOCYTES NFR BLD AUTO: 7.4 % — SIGNIFICANT CHANGE UP (ref 2–14)
NEUTROPHILS # BLD AUTO: 7.45 K/UL — HIGH (ref 1.8–7.4)
NEUTROPHILS NFR BLD AUTO: 70.1 % — SIGNIFICANT CHANGE UP (ref 43–77)
PHOSPHATE SERPL-MCNC: 2.5 MG/DL — SIGNIFICANT CHANGE UP (ref 2.4–4.7)
PLATELET # BLD AUTO: 534 K/UL — HIGH (ref 150–400)
POTASSIUM SERPL-MCNC: 3.6 MMOL/L — SIGNIFICANT CHANGE UP (ref 3.5–5.3)
POTASSIUM SERPL-SCNC: 3.6 MMOL/L — SIGNIFICANT CHANGE UP (ref 3.5–5.3)
PROT SERPL-MCNC: 7.8 G/DL — SIGNIFICANT CHANGE UP (ref 6.6–8.7)
RBC # BLD: 4.81 M/UL — SIGNIFICANT CHANGE UP (ref 3.8–5.2)
RBC # FLD: 13.2 % — SIGNIFICANT CHANGE UP (ref 10.3–14.5)
SARS-COV-2 IGG+IGM SERPL QL IA: 0.4 U/ML — SIGNIFICANT CHANGE UP
SARS-COV-2 IGG+IGM SERPL QL IA: NEGATIVE — SIGNIFICANT CHANGE UP
SODIUM SERPL-SCNC: 136 MMOL/L — SIGNIFICANT CHANGE UP (ref 135–145)
WBC # BLD: 10.6 K/UL — HIGH (ref 3.8–10.5)
WBC # FLD AUTO: 10.6 K/UL — HIGH (ref 3.8–10.5)

## 2021-04-11 PROCEDURE — U0005: CPT

## 2021-04-11 PROCEDURE — 76000 FLUOROSCOPY <1 HR PHYS/QHP: CPT

## 2021-04-11 PROCEDURE — 83605 ASSAY OF LACTIC ACID: CPT

## 2021-04-11 PROCEDURE — 80053 COMPREHEN METABOLIC PANEL: CPT

## 2021-04-11 PROCEDURE — 74177 CT ABD & PELVIS W/CONTRAST: CPT

## 2021-04-11 PROCEDURE — 80076 HEPATIC FUNCTION PANEL: CPT

## 2021-04-11 PROCEDURE — 93005 ELECTROCARDIOGRAM TRACING: CPT

## 2021-04-11 PROCEDURE — U0003: CPT

## 2021-04-11 PROCEDURE — 86900 BLOOD TYPING SEROLOGIC ABO: CPT

## 2021-04-11 PROCEDURE — 86901 BLOOD TYPING SEROLOGIC RH(D): CPT

## 2021-04-11 PROCEDURE — 86850 RBC ANTIBODY SCREEN: CPT

## 2021-04-11 PROCEDURE — 99285 EMERGENCY DEPT VISIT HI MDM: CPT | Mod: 25

## 2021-04-11 PROCEDURE — 85025 COMPLETE CBC W/AUTO DIFF WBC: CPT

## 2021-04-11 PROCEDURE — 83735 ASSAY OF MAGNESIUM: CPT

## 2021-04-11 PROCEDURE — 85610 PROTHROMBIN TIME: CPT

## 2021-04-11 PROCEDURE — C1769: CPT

## 2021-04-11 PROCEDURE — 84100 ASSAY OF PHOSPHORUS: CPT

## 2021-04-11 PROCEDURE — 71045 X-RAY EXAM CHEST 1 VIEW: CPT

## 2021-04-11 PROCEDURE — 86803 HEPATITIS C AB TEST: CPT

## 2021-04-11 PROCEDURE — 86769 SARS-COV-2 COVID-19 ANTIBODY: CPT

## 2021-04-11 PROCEDURE — 36415 COLL VENOUS BLD VENIPUNCTURE: CPT

## 2021-04-11 PROCEDURE — 87040 BLOOD CULTURE FOR BACTERIA: CPT

## 2021-04-11 PROCEDURE — 85730 THROMBOPLASTIN TIME PARTIAL: CPT

## 2021-04-11 PROCEDURE — C1729: CPT

## 2021-04-11 PROCEDURE — 80048 BASIC METABOLIC PNL TOTAL CA: CPT

## 2021-04-11 PROCEDURE — 99232 SBSQ HOSP IP/OBS MODERATE 35: CPT

## 2021-04-11 PROCEDURE — 83690 ASSAY OF LIPASE: CPT

## 2021-04-11 PROCEDURE — 94640 AIRWAY INHALATION TREATMENT: CPT

## 2021-04-11 RX ORDER — ACETAMINOPHEN 500 MG
650 TABLET ORAL EVERY 6 HOURS
Refills: 0 | Status: DISCONTINUED | OUTPATIENT
Start: 2021-04-11 | End: 2021-04-11

## 2021-04-11 RX ORDER — METRONIDAZOLE 500 MG
0 TABLET ORAL
Qty: 0 | Refills: 0 | DISCHARGE

## 2021-04-11 RX ORDER — ACETAMINOPHEN 500 MG
2 TABLET ORAL
Qty: 0 | Refills: 0 | DISCHARGE
Start: 2021-04-11

## 2021-04-11 RX ORDER — TRAMADOL HYDROCHLORIDE 50 MG/1
50 TABLET ORAL EVERY 6 HOURS
Refills: 0 | Status: DISCONTINUED | OUTPATIENT
Start: 2021-04-11 | End: 2021-04-11

## 2021-04-11 RX ORDER — CIPROFLOXACIN LACTATE 400MG/40ML
0 VIAL (ML) INTRAVENOUS
Qty: 0 | Refills: 0 | DISCHARGE

## 2021-04-11 RX ORDER — ALBUTEROL 90 UG/1
2 AEROSOL, METERED ORAL EVERY 6 HOURS
Refills: 0 | Status: DISCONTINUED | OUTPATIENT
Start: 2021-04-11 | End: 2021-04-11

## 2021-04-11 RX ADMIN — TRAMADOL HYDROCHLORIDE 50 MILLIGRAM(S): 50 TABLET ORAL at 05:17

## 2021-04-11 RX ADMIN — PIPERACILLIN AND TAZOBACTAM 25 GRAM(S): 4; .5 INJECTION, POWDER, LYOPHILIZED, FOR SOLUTION INTRAVENOUS at 05:17

## 2021-04-11 RX ADMIN — Medication 0.2 MILLIGRAM(S): at 05:17

## 2021-04-11 RX ADMIN — Medication 100 MILLIGRAM(S): at 05:17

## 2021-04-11 RX ADMIN — Medication 75 MICROGRAM(S): at 05:17

## 2021-04-11 RX ADMIN — AMLODIPINE BESYLATE 2.5 MILLIGRAM(S): 2.5 TABLET ORAL at 05:17

## 2021-04-11 RX ADMIN — PANTOPRAZOLE SODIUM 40 MILLIGRAM(S): 20 TABLET, DELAYED RELEASE ORAL at 05:18

## 2021-04-11 RX ADMIN — TRAMADOL HYDROCHLORIDE 50 MILLIGRAM(S): 50 TABLET ORAL at 06:07

## 2021-04-11 RX ADMIN — ALBUTEROL 2 PUFF(S): 90 AEROSOL, METERED ORAL at 03:28

## 2021-04-11 RX ADMIN — ENOXAPARIN SODIUM 40 MILLIGRAM(S): 100 INJECTION SUBCUTANEOUS at 11:40

## 2021-04-11 RX ADMIN — Medication 1 TABLET(S): at 11:39

## 2021-04-11 NOTE — PROGRESS NOTE ADULT - SUBJECTIVE AND OBJECTIVE BOX
IR Post Procedure Note    Diagnosis: Cholecystostomy Tube Replacement    Procedure: Percutaneous Cholecysostomy drain replacement    : German Alvarado MD    Contrast: 5 cc    Anesthesia: 1% Lidocaine Subcutaneous    Estimated Blood Loss: Less than 10cc    Specimens: Identified, Labeled, Confirmed and Sent to Lab.    Complications: No Immediate Complications    Anticoagulation: Resume in 24 Hours    Findings & Plan: 10F drain replaced in gallbladder Confirmed w fluoroscopy. Continue gravity drainage and return for draincheck in 6-8 weeks.    Please call Interventional Radiology with any questions, concerns, or issues. 
INTERVAL HPI/OVERNIGHT EVENTS:    No acute overnight events reported. Patient now s/p IR guided Perc Yamilka drain placement after previous drain became dislodged. Patient seen to be doing well with no major complaints, denies pain, nausea, emesis, fevers nor chills.       MEDICATIONS  (STANDING):  ALBUTerol   0.5% 2.5 milliGRAM(s) Nebulizer every 6 hours  amLODIPine   Tablet 2.5 milliGRAM(s) Oral daily  cloNIDine 0.2 milliGRAM(s) Oral daily  enoxaparin Injectable 40 milliGRAM(s) SubCutaneous daily  levothyroxine 75 MICROGram(s) Oral daily  metoprolol tartrate 100 milliGRAM(s) Oral two times a day  pantoprazole    Tablet 40 milliGRAM(s) Oral before breakfast  piperacillin/tazobactam IVPB.. 3.375 Gram(s) IV Intermittent every 8 hours    MEDICATIONS  (PRN):  ondansetron Injectable 4 milliGRAM(s) IV Push every 6 hours PRN Nausea      Vital Signs Last 24 Hrs  T(C): 36.3 (10 Apr 2021 23:18), Max: 37.1 (10 Apr 2021 06:49)  T(F): 97.4 (10 Apr 2021 23:18), Max: 98.7 (10 Apr 2021 06:49)  HR: 74 (10 Apr 2021 23:18) (74 - 94)  BP: 166/67 (10 Apr 2021 23:18) (105/63 - 166/67)  BP(mean): --  RR: 18 (10 Apr 2021 23:18) (18 - 20)  SpO2: 93% (10 Apr 2021 23:18) (93% - 99%)    PE  Gen: Not in acute distress  Pulm: Nonlabored breathing, no conversational dyspnea   CV: RRR, S1, S2  Abd: Soft, non-distended, non-tender, no rebound tenderness, non-peritoneal, RUQ Perc Yamilka drain, no surrounding erythema, drain with SS output.   Ext: No pitting edema B/L  Vasc: 2+ radial and PT pulses B/L  Neuro: AAOX3, no neurovascular deficits       I&O's Detail    10 Apr 2021 07:01  -  11 Apr 2021 01:32  --------------------------------------------------------  IN:    IV PiggyBack: 100 mL    Lactated Ringers: 150 mL    Oral Fluid: 240 mL  Total IN: 490 mL    OUT:  Total OUT: 0 mL    Total NET: 490 mL          LABS:                        12.2   10.02 )-----------( 517      ( 10 Apr 2021 06:54 )             38.6     04-10    138  |  101  |  10.0  ----------------------------<  102<H>  3.7   |  23.0  |  0.73    Ca    8.9      10 Apr 2021 06:54    TPro  7.6  /  Alb  3.7  /  TBili  0.4  /  DBili  x   /  AST  10  /  ALT  11  /  AlkPhos  115  04-10    PT/INR - ( 10 Apr 2021 06:54 )   PT: 12.2 sec;   INR: 1.05 ratio         PTT - ( 10 Apr 2021 06:54 )  PTT:27.2 sec      RADIOLOGY & ADDITIONAL STUDIES:
HPI/OVERNIGHT EVENTS:  No acute overnight events. Vital signs stable. Patient reports pain only when coughing or sneezing, but hasn't been doing so. Otherwise no complaints. Denies nausea, vomiting, fever, chills, chest pain, shortness of breath, or any new or concerning symptoms. Was NPO overnight for IR procedure today.     MEDICATIONS  (STANDING):  ALBUTerol   0.5% 2.5 milliGRAM(s) Nebulizer every 6 hours  amLODIPine   Tablet 2.5 milliGRAM(s) Oral daily  cloNIDine 0.2 milliGRAM(s) Oral daily  lactated ringers. 1000 milliLiter(s) (75 mL/Hr) IV Continuous <Continuous>  levothyroxine 75 MICROGram(s) Oral daily  metoprolol tartrate 100 milliGRAM(s) Oral two times a day  pantoprazole    Tablet 40 milliGRAM(s) Oral before breakfast  piperacillin/tazobactam IVPB.. 3.375 Gram(s) IV Intermittent every 8 hours    MEDICATIONS  (PRN):  ondansetron Injectable 4 milliGRAM(s) IV Push every 6 hours PRN Nausea      Vital Signs Last 24 Hrs  T(C): 36.8 (10 Apr 2021 05:49), Max: 36.8 (09 Apr 2021 16:46)  T(F): 98.3 (10 Apr 2021 05:49), Max: 98.3 (09 Apr 2021 20:51)  HR: 93 (10 Apr 2021 05:49) (93 - 102)  BP: 150/87 (10 Apr 2021 05:49) (150/87 - 202/100)  BP(mean): --  RR: 20 (10 Apr 2021 05:49) (20 - 20)  SpO2: 99% (10 Apr 2021 05:49) (99% - 99%)    Constitutional: patient resting comfortably in bed, in no acute distress  HEENT: EOMI, no active drainage or redness  Neck: Full ROM without pain  Respiratory: respirations are unlabored, no accessory muscle use, no conversational dyspnea  Cardiovascular: regular rate & rhythm  Gastrointestinal: Abdomen soft, non-tender, mildly distended. RUQ drain insertion site with mild drainage, no bleeding, Mild erythema at this area under breast.   Neurological:  no gross sensory / motor / coordination deficits  Musculoskeletal: No limitation of movement      I&O's Detail      LABS:                        12.6   13.76 )-----------( 545      ( 09 Apr 2021 18:28 )             39.2     04-09    137  |  98  |  10.0  ----------------------------<  99  4.0   |  24.0  |  0.69    Ca    9.1      09 Apr 2021 18:28    TPro  8.0  /  Alb  3.9  /  TBili  0.4  /  DBili  x   /  AST  14  /  ALT  12  /  AlkPhos  117  04-09    PT/INR - ( 09 Apr 2021 18:28 )   PT: 12.5 sec;   INR: 1.08 ratio         PTT - ( 09 Apr 2021 18:28 )  PTT:31.0 sec

## 2021-04-11 NOTE — CHART NOTE - NSCHARTNOTEFT_GEN_A_CORE
Confirmed with patient allergy to penicillin, unclear which formulation. Reported rash, itching, swelling, and difficulty swallowing. She has been tolerating IV Zosyn while inpatient.  Plan for discharge on Augmentin for 7 days. Discussed with pharmacist who reports Augmentin should be okay considering she tolerated zosyn. Will give one dose and monitor for 1-2 hours prior to discharge. CM confirming home services and patient otherwise stable for discharge home.

## 2021-04-11 NOTE — DISCHARGE NOTE PROVIDER - NSDCCPCAREPLAN_GEN_ALL_CORE_FT
PRINCIPAL DISCHARGE DIAGNOSIS  Diagnosis: Cholecystitis  Assessment and Plan of Treatment: s/p perc suzi

## 2021-04-11 NOTE — DISCHARGE NOTE NURSING/CASE MANAGEMENT/SOCIAL WORK - NSCORESITESY/N_GEN_A_CORE_RD
"Patient report " muscle spasms on the left side, started two days ago on my heart, the came down the left side" Currently having muscle spasms on the left side. The spasms was under her breast, left side, down to the stomach. The high heart rate started yesterday verbalized by daughter, Janelle. Patient c/o dizziness, weakness, HA,fever and chills off and on. Denies N/V. pain 8/10  " No

## 2021-04-11 NOTE — DISCHARGE NOTE NURSING/CASE MANAGEMENT/SOCIAL WORK - PATIENT PORTAL LINK FT
You can access the FollowMyHealth Patient Portal offered by [facility] by registering at the following website: [email or web address]. By joining [facility]’s FollowMyHealth portal, you will also be able to view your health information using other applications (apps) compatible with our system.

## 2021-04-11 NOTE — DISCHARGE NOTE PROVIDER - NSDCFUADDINST_GEN_ALL_CORE_FT
Follow up: Please call and make an appointment with the Surgery Clinic 10-14 days after discharge. Also, please call and make an appointment with your primary care physician as per your usual schedule.     Activity: May return to normal activities as tolerated.    Diet: May continue regular diet.    Medications: Please take all medications listed on your discharge paperwork as prescribed. Pain medication has been prescribed for you. Please, take it as it has been prescribed, do not drive or operate heavy machinery while taking narcotics.  You are encouraged to take over-the-counter tylenol and/or ibuprofen for pain relief when you feel your pain no longer warrants the use of narcotic pain medications, however DO NOT TAKE percocet and tylenol at the same time as they contain the same active ingredient (acetaminophen). Take only percocet OR tylenol.    Wound Care: Please, keep wound site clean and dry. You may shower, but do not bathe.    Patient is advised to RETURN TO THE EMERGENCY DEPARTMENT for any of the following - worsening pain, fever/chills, nausea/vomiting, altered mental status, chest pain, shortness of breath, or any other new / worsening symptom.  Follow up: Please call and make an appointment with the Dr. Dougherty in 10-14 days after discharge. Also, please call and make an appointment with your primary care physician as per your usual schedule.     Activity: May return to normal activities as tolerated.    Diet: May continue regular diet.    Medications: Please take all medications listed on your discharge paperwork as prescribed. Pain medication has been prescribed for you. Please, take it as it has been prescribed, do not drive or operate heavy machinery while taking narcotics.  You are encouraged to take over-the-counter tylenol and/or ibuprofen for pain relief when you feel your pain no longer warrants the use of narcotic pain medications, however DO NOT TAKE percocet and tylenol at the same time as they contain the same active ingredient (acetaminophen). Take only percocet OR tylenol.    Wound Care: Please, keep wound site clean and dry. You may shower, but do not bathe. Please empty biliary bag output daily and record this output. May change this overlying dressing as need WITH SPECIAL CARE not to pull on tube when removing old dressing.     Patient is advised to RETURN TO THE EMERGENCY DEPARTMENT for any of the following - worsening pain, fever/chills, nausea/vomiting, altered mental status, chest pain, shortness of breath, or any other new / worsening symptom.

## 2021-04-11 NOTE — DISCHARGE NOTE PROVIDER - NSDCMRMEDTOKEN_GEN_ALL_CORE_FT
albuterol sulfate 2.5 mg/3 mL (0.083 %) solution for nebulization:   amitriptyline 50 mg tablet:   amlodipine 2.5 mg tablet:   ciprofloxacin 500 mg tablet:   clonidine HCl 0.2 mg tablet:   clopidogrel 75 mg tablet:   ezetimibe 10 mg tablet:   furosemide 40 mg tablet:   gabapentin 100 mg capsule:   levothyroxine 75 mcg tablet:   megestrol 40 mg tablet:   metoprolol succinate  mg tablet,extended release 24 hr:   METRONIDAZOLE 500 MG TABLET: take 1 tablet by mouth every 8 hours for 3 days  pantoprazole 40 mg tablet,delayed release:   potassium chloride ER 10 mEq capsule,extended release:    acetaminophen 325 mg oral tablet: 2 tab(s) orally every 6 hours, As needed, Temp greater or equal to 38C (100.4F), Mild Pain (1 - 3)  albuterol sulfate 2.5 mg/3 mL (0.083 %) solution for nebulization:   amitriptyline 50 mg tablet:   amlodipine 2.5 mg tablet:   amoxicillin-clavulanate 875 mg-125 mg oral tablet: 1 tab(s) orally 2 times a day   ciprofloxacin 500 mg tablet:   clonidine HCl 0.2 mg tablet:   clopidogrel 75 mg tablet:   ezetimibe 10 mg tablet:   furosemide 40 mg tablet:   gabapentin 100 mg capsule:   levothyroxine 75 mcg tablet:   megestrol 40 mg tablet:   metoprolol succinate  mg tablet,extended release 24 hr:   METRONIDAZOLE 500 MG TABLET: take 1 tablet by mouth every 8 hours for 3 days  pantoprazole 40 mg tablet,delayed release:   potassium chloride ER 10 mEq capsule,extended release:    acetaminophen 325 mg oral tablet: 2 tab(s) orally every 6 hours, As needed, Temp greater or equal to 38C (100.4F), Mild Pain (1 - 3)  albuterol sulfate 2.5 mg/3 mL (0.083 %) solution for nebulization:   amitriptyline 50 mg tablet:   amlodipine 2.5 mg tablet:   amoxicillin-clavulanate 875 mg-125 mg oral tablet: 1 tab(s) orally 2 times a day   clonidine HCl 0.2 mg tablet:   clopidogrel 75 mg tablet:   ezetimibe 10 mg tablet:   furosemide 40 mg tablet:   gabapentin 100 mg capsule:   levothyroxine 75 mcg tablet:   megestrol 40 mg tablet:   metoprolol succinate  mg tablet,extended release 24 hr:   pantoprazole 40 mg tablet,delayed release:   potassium chloride ER 10 mEq capsule,extended release:

## 2021-04-11 NOTE — DISCHARGE NOTE PROVIDER - CARE PROVIDER_API CALL
Zenon Dougherty)  Surgery  Bariatric  [address] [place] [address] [id number]  Phone: [phone]  Fax: [phone]  Follow Up Time: 2 weeks

## 2021-04-11 NOTE — DISCHARGE NOTE PROVIDER - NSDCFUSCHEDAPPT_GEN_ALL_CORE_FT
[facility]BY [facility]T ; 05/07/2021 ; [facility]  TOBY HURT ; 05/24/2021 ; [facility] PulmMed [address]  TOBY HURT ; 05/24/2021 ; [facility] [address]  TOBY [facility]URT ; 05/24/2021 ; [facility] Cardio [address]

## 2021-04-11 NOTE — DISCHARGE NOTE PROVIDER - HOSPITAL COURSE
HPI: 70y Female who presented to ED on 4/09/21 after her percutaneous cholecystostomy tube (previously placed at an outside facility) became dislodged the morning prior. Patient's pain had increased slightly and was concerned prompting her to come to the ED.     Hospital Course:     Upon arrival to ED, patient's labs reveled leukocytosis and CT scan with findings concerning for abscess. Surgery team discussed with patient and her daughter, possible management options which included surgical cholecystectomy but was advised that due to risk for conversion to open procedure and associated technical difficulty of case, it was advisable to proceed with IR drainage rather than lap suzi. On 4/10 patient underwent an US guided Prec Suzi drain placement by IR, confirmed by fluoroscopy. Post-procedure, patient noted improvement in her symptoms, seen to be tolerating regular diet. Today patient is ready for discharge.        HPI: 70y Female who presented to ED on 4/09/21 after her percutaneous cholecystostomy tube (previously placed at an outside facility) became dislodged the morning prior. Patient's pain had increased slightly and was concerned prompting her to come to the ED.     Hospital Course:     Upon arrival to ED, patient's labs reveled leukocytosis and CT scan with findings concerning for abscess. Surgery team discussed with patient and her daughter, possible management options which included surgical cholecystectomy but was advised that due to risk for conversion to open procedure and associated technical difficulty of case, it was advisable to proceed with IR drainage rather than lap suzi. On 4/10 patient underwent an US guided Prec Suzi drain placement by IR, confirmed by fluoroscopy. Post-procedure, patient noted improvement in her symptoms, seen to be tolerating regular diet. Labs on following day were unremarkable. At time of discharge, patient's pain was improved and well controlled, she was tolerating a diet, and medically stable for discharge home with services that had been established previously

## 2021-04-11 NOTE — PROGRESS NOTE ADULT - ASSESSMENT
Patient is a 70y old female with dislodged percutaneous cholecystostomy tube, now s/p re-insertion of IR guided Per Yamilka drain POD 1.    PLAN:    - IV zosyn  -  monitor drain output  - f/u AM labs  - Pending discharge in AM

## 2021-04-14 LAB
CULTURE RESULTS: SIGNIFICANT CHANGE UP
CULTURE RESULTS: SIGNIFICANT CHANGE UP
SPECIMEN SOURCE: SIGNIFICANT CHANGE UP
SPECIMEN SOURCE: SIGNIFICANT CHANGE UP

## 2021-04-29 ENCOUNTER — APPOINTMENT (OUTPATIENT)
Dept: SURGERY | Facility: CLINIC | Age: 71
End: 2021-04-29
Payer: MEDICARE

## 2021-04-29 PROCEDURE — 99213 OFFICE O/P EST LOW 20 MIN: CPT | Mod: 95

## 2021-04-29 NOTE — ASSESSMENT
[FreeTextEntry1] : Ms. HURT is a 71 year old woman with admission in December 2020 to [facility] for cholecystitis s/p cholecystostomy tube placement with tube dislodgement with recurrent cholecystitis s/p admission to [facility] and replacement of cholecystostomy tube on 4/10/21. We discussed the options of robotic cholecystectomy, laparoscopic cholecystectomy, and nonoperative management. The risks/benefits and alternatives were discussed at length and all questions were answered. The patient appears to understand and wishes to proceed with robotic cholecystectomy.\par

## 2021-04-29 NOTE — HISTORY OF PRESENT ILLNESS
[Home] : at home, [unfilled] , at the time of the visit. [Medical Office: ([facility])___] : at the medical office located in  [Family Member] : family member [Verbal consent obtained from patient] : the patient, [unfilled] [de-identified] : Ms. HURT is a 71 year old woman with admission in December 2020 to [place] for cholecystitis s/p cholecystostomy tube placement with tube dislodgement with recurrent cholecystitis s/p admission to [facility] and replacement of cholecystostomy tube on 4/10/21. Of note, tube study performed previously as outpatient had shown that cystic duct was obstructed. Pt denies pain since discharge. Drain output has been consistent since discharge and is "1-2 ounces per day" of light-colored thin fluid. Denies fever/chills/nausea/emesis. Tolerating diet.

## 2021-05-07 ENCOUNTER — APPOINTMENT (OUTPATIENT)
Dept: GYNECOLOGIC ONCOLOGY | Facility: CLINIC | Age: 71
End: 2021-05-07
Payer: MEDICARE

## 2021-05-07 PROCEDURE — 99441: CPT

## 2021-05-07 NOTE — END OF VISIT
[FreeTextEntry3] : 3 months visit for Endometrial biopsy [Time Spent: ___ minutes] : I have spent [unfilled] minutes of time on the encounter.

## 2021-05-07 NOTE — ASSESSMENT
[FreeTextEntry1] : 70 yo with PMB on empyric MEgace 40 mg PO BID. Will provide refill and continue current management. Will repeat biopsy in 3 months.

## 2021-05-07 NOTE — HISTORY OF PRESENT ILLNESS
[Home] : at home, [unfilled] , at the time of the visit. [Other Location: e.g. Home (Enter Location, City,State)___] : at [unfilled] [FreeTextEntry1] : Pt is a 70 yo with PMB and attempted Endometrial biopsy which showed no evidence of atypia or malignancy. Empyric treatment was started with Megace 40 mg PO BID with resolution of bleeding. She presents for 3 month follow up on her symptoms and discussion of further management.\par \par In the interim, she is scheduled to have a cholecystectomy with Dr. Zenon Dougherty due to repeat episodes of cholecystitis. She has no new gynecologic concerns and no bleeding since last discussion in Feb. 2021.

## 2021-05-13 ENCOUNTER — NON-APPOINTMENT (OUTPATIENT)
Age: 71
End: 2021-05-13

## 2021-05-13 ENCOUNTER — EMERGENCY (EMERGENCY)
Facility: HOSPITAL | Age: 71
LOS: 1 days | Discharge: HOME | End: 2021-05-13
Attending: STUDENT IN AN ORGANIZED HEALTH CARE EDUCATION/TRAINING PROGRAM
Payer: MEDICARE

## 2021-05-13 VITALS
OXYGEN SATURATION: 99 % | DIASTOLIC BLOOD PRESSURE: 80 MMHG | WEIGHT: 179.02 LBS | TEMPERATURE: 98 F | SYSTOLIC BLOOD PRESSURE: 133 MMHG | HEART RATE: 105 BPM | RESPIRATION RATE: 20 BRPM | HEIGHT: 60 IN

## 2021-05-13 LAB
ALBUMIN SERPL ELPH-MCNC: 3.9 G/DL — SIGNIFICANT CHANGE UP (ref 3.3–5.2)
ALP SERPL-CCNC: 108 U/L — SIGNIFICANT CHANGE UP (ref 40–120)
ALT FLD-CCNC: 13 U/L — SIGNIFICANT CHANGE UP
ANION GAP SERPL CALC-SCNC: 10 MMOL/L — SIGNIFICANT CHANGE UP (ref 5–17)
APTT BLD: 28.3 SEC — SIGNIFICANT CHANGE UP (ref 27.5–35.5)
AST SERPL-CCNC: 12 U/L — SIGNIFICANT CHANGE UP
BASOPHILS # BLD AUTO: 0.05 K/UL — SIGNIFICANT CHANGE UP (ref 0–0.2)
BASOPHILS NFR BLD AUTO: 0.4 % — SIGNIFICANT CHANGE UP (ref 0–2)
BILIRUB SERPL-MCNC: 0.3 MG/DL — LOW (ref 0.4–2)
BLD GP AB SCN SERPL QL: SIGNIFICANT CHANGE UP
BUN SERPL-MCNC: 13 MG/DL — SIGNIFICANT CHANGE UP (ref 8–20)
CALCIUM SERPL-MCNC: 9.6 MG/DL — SIGNIFICANT CHANGE UP (ref 8.6–10.2)
CHLORIDE SERPL-SCNC: 103 MMOL/L — SIGNIFICANT CHANGE UP (ref 98–107)
CO2 SERPL-SCNC: 24 MMOL/L — SIGNIFICANT CHANGE UP (ref 22–29)
CREAT SERPL-MCNC: 0.78 MG/DL — SIGNIFICANT CHANGE UP (ref 0.5–1.3)
EOSINOPHIL # BLD AUTO: 0.23 K/UL — SIGNIFICANT CHANGE UP (ref 0–0.5)
EOSINOPHIL NFR BLD AUTO: 2 % — SIGNIFICANT CHANGE UP (ref 0–6)
GLUCOSE SERPL-MCNC: 94 MG/DL — SIGNIFICANT CHANGE UP (ref 70–99)
HCT VFR BLD CALC: 41 % — SIGNIFICANT CHANGE UP (ref 34.5–45)
HGB BLD-MCNC: 12.8 G/DL — SIGNIFICANT CHANGE UP (ref 11.5–15.5)
IMM GRANULOCYTES NFR BLD AUTO: 0.8 % — SIGNIFICANT CHANGE UP (ref 0–1.5)
INR BLD: 1.05 RATIO — SIGNIFICANT CHANGE UP (ref 0.88–1.16)
LIDOCAIN IGE QN: 40 U/L — SIGNIFICANT CHANGE UP (ref 22–51)
LYMPHOCYTES # BLD AUTO: 2.75 K/UL — SIGNIFICANT CHANGE UP (ref 1–3.3)
LYMPHOCYTES # BLD AUTO: 24.2 % — SIGNIFICANT CHANGE UP (ref 13–44)
MAGNESIUM SERPL-MCNC: 2.3 MG/DL — SIGNIFICANT CHANGE UP (ref 1.6–2.6)
MCHC RBC-ENTMCNC: 27.1 PG — SIGNIFICANT CHANGE UP (ref 27–34)
MCHC RBC-ENTMCNC: 31.2 GM/DL — LOW (ref 32–36)
MCV RBC AUTO: 86.9 FL — SIGNIFICANT CHANGE UP (ref 80–100)
MONOCYTES # BLD AUTO: 0.81 K/UL — SIGNIFICANT CHANGE UP (ref 0–0.9)
MONOCYTES NFR BLD AUTO: 7.1 % — SIGNIFICANT CHANGE UP (ref 2–14)
NEUTROPHILS # BLD AUTO: 7.42 K/UL — HIGH (ref 1.8–7.4)
NEUTROPHILS NFR BLD AUTO: 65.5 % — SIGNIFICANT CHANGE UP (ref 43–77)
NT-PROBNP SERPL-SCNC: 38 PG/ML — SIGNIFICANT CHANGE UP (ref 0–300)
PLATELET # BLD AUTO: 514 K/UL — HIGH (ref 150–400)
POTASSIUM SERPL-MCNC: 4.2 MMOL/L — SIGNIFICANT CHANGE UP (ref 3.5–5.3)
POTASSIUM SERPL-SCNC: 4.2 MMOL/L — SIGNIFICANT CHANGE UP (ref 3.5–5.3)
PROT SERPL-MCNC: 8.1 G/DL — SIGNIFICANT CHANGE UP (ref 6.6–8.7)
PROTHROM AB SERPL-ACNC: 12.1 SEC — SIGNIFICANT CHANGE UP (ref 10.6–13.6)
RBC # BLD: 4.72 M/UL — SIGNIFICANT CHANGE UP (ref 3.8–5.2)
RBC # FLD: 15 % — HIGH (ref 10.3–14.5)
SODIUM SERPL-SCNC: 137 MMOL/L — SIGNIFICANT CHANGE UP (ref 135–145)
TROPONIN T SERPL-MCNC: <0.01 NG/ML — SIGNIFICANT CHANGE UP (ref 0–0.06)
WBC # BLD: 11.35 K/UL — HIGH (ref 3.8–10.5)
WBC # FLD AUTO: 11.35 K/UL — HIGH (ref 3.8–10.5)

## 2021-05-13 PROCEDURE — 93010 ELECTROCARDIOGRAM REPORT: CPT

## 2021-05-13 PROCEDURE — 71045 X-RAY EXAM CHEST 1 VIEW: CPT | Mod: 26

## 2021-05-13 PROCEDURE — 99212 OFFICE O/P EST SF 10 MIN: CPT

## 2021-05-13 PROCEDURE — 99282 EMERGENCY DEPT VISIT SF MDM: CPT

## 2021-05-13 PROCEDURE — 99285 EMERGENCY DEPT VISIT HI MDM: CPT

## 2021-05-13 NOTE — ED ADULT TRIAGE NOTE - CHIEF COMPLAINT QUOTE
pt with gallbladder drain sent by Dr. Fu due to increased drainage around site. plan for gallbladder removal in a few weeks. pt reports site is tender upon palpation.

## 2021-05-13 NOTE — CONSULT NOTE ADULT - ATTENDING COMMENTS
No surgical intervention indicated   Pt discharged by ED  Plan for followup with me as outpatient and planning for elective cholecystectomy and removal of drain  Case discussed with on call surgery resident No emergent surgical intervention indicated   Pt discharged by ED  Plan for followup with me as outpatient and planning for elective cholecystectomy and removal of drain  Case discussed with on call surgery resident

## 2021-05-13 NOTE — CONSULT NOTE ADULT - SUBJECTIVE AND OBJECTIVE BOX
Acute Care Surgery Consult Note    HPI:   71y Female who was recently admitted to surgical service on 4/09/21 after her percutaneous cholecystostomy tube (previously placed at an outside facility) became dislodged the morning prior. Patient's pain had increased slightly and was concerned prompting her to come to the ED.   Upon arrival to ED, patient's labs reveled leukocytosis and CT scan with findings concerning for abscess. Surgery team discussed with patient and her daughter, possible management options which included surgical cholecystectomy but was advised that due to risk for conversion to open procedure and associated technical difficulty of case, it was advisable to proceed with IR drainage rather than lap suzi. On 4/10 patient underwent an US guided Prec Suzi drain placement by IR, confirmed by fluoroscopy. Post-procedure, patient noted improvement in her symptoms, seen to be tolerating regular diet. Labs on following day were unremarkable. At time of discharge, patient's pain was improved and well controlled, she was tolerating a diet, and medically stable for discharge home with services that had been established previously. patient was discharged from the hospital on 4/11/21.     Patient returns to ED today with report of clear yellow leakage around cholecystostomy tube. Patient also reporting some abdominal distension and dyspnea. patient denies other symptoms including f/c/sob/n/v.     PMH: HTN, HLD, CVA, GERD, A.fib, HF      PSH: percutaneous cholecystostomy tube    Home Medications:  acetaminophen 325 mg oral tablet: 2 tab(s) orally every 6 hours, As needed, Temp greater or equal to 38C (100.4F), Mild Pain (1 - 3)  albuterol sulfate 2.5 mg/3 mL (0.083 %) solution for nebulization:   amitriptyline 50 mg tablet:   amlodipine 2.5 mg tablet:   amoxicillin-clavulanate 875 mg-125 mg oral tablet: 1 tab(s) orally 2 times a day   clonidine HCl 0.2 mg tablet:   clopidogrel 75 mg tablet:   ezetimibe 10 mg tablet:   furosemide 40 mg tablet:   gabapentin 100 mg capsule:   levothyroxine 75 mcg tablet:   megestrol 40 mg tablet:   metoprolol succinate  mg tablet,extended release 24 hr:   pantoprazole 40 mg tablet,delayed release:   potassium chloride ER 10 mEq capsule,extended release:     ALL:  penicillin (Unknown)      FMH:  Denies family history of gastrointestinal malignancy     SOC Hx:   Denies etoh, tobacco, or drug use     Physical Exam:   Gen: well appearing female, NAD  Neuro: AOx3, EOMI, no gross deficit on exam  HEENT: No oral/mucosal lesions, no neck masses or lymphadenopathy  RESP: mild dyspnea, saturating well on room air  CVS: RR  GI: abd soft, distended. RUQ perc suzi drain with serous output, surrounding skin c/d/i with no purulence or drainage. Abdomen distended with fluid wave present  Extremities: 2+ peripheral pulses      LABS:  cret                        12.8   11.35 )-----------( 514      ( 13 May 2021 22:06 )             41.0     05-13    137  |  103  |  13.0  ----------------------------<  94  4.2   |  24.0  |  0.78    Ca    9.6      13 May 2021 22:06  Mg     2.3     05-13    TPro  8.1  /  Alb  3.9  /  TBili  0.3<L>  /  DBili  x   /  AST  12  /  ALT  13  /  AlkPhos  108  05-13    PT/INR - ( 13 May 2021 22:06 )   PT: 12.1 sec;   INR: 1.05 ratio         PTT - ( 13 May 2021 22:06 )  PTT:28.3 sec

## 2021-05-13 NOTE — ED ADULT NURSE NOTE - OBJECTIVE STATEMENT
Patient A&Ox4, denies any pain or discomfort. Stated was told to come to ED by MD due to excessive drainage at gallbladder drainage insertion site x3 days. Stated has drainage bag due to severe infection in gallbladder, is unsafe to remove. Denies antibiotic use. Stated is scheduled to have drain & gallbladder removed in June. Drain insertion site C/D/I, patent. Drainage cloudy. Abdomen distended. BLLE edema, chronic in nature. Denies any chest pain, shortness of breath, nausea or dizziness.  in progress, O2 sat 99% room air. Plan of care discussed, all questions answered.

## 2021-05-13 NOTE — ED PROVIDER NOTE - CLINICAL SUMMARY MEDICAL DECISION MAKING FREE TEXT BOX
70y/o F presents to the ED c/o gallbladder drainage at insertion site which began 3 days ago. Additional c/o abdominal tenderness. 70y/o F presents to the ED c/o gallbladder drainage at insertion site which began 3 days ago. Additional c/o abdominal tenderness. Pt with improving gallbladder findings on CTAP no PE no evidence of CHF, exam at baseline, cleared by surgery, stable for dc, called daughter and informed her of  everthing and follow up

## 2021-05-13 NOTE — CONSULT NOTE ADULT - ASSESSMENT
71y Female who was recently admitted to surgical service on 4/09/21 after her percutaneous cholecystostomy tube (previously placed at an outside facility) became dislodged and subsequentally replaced by IR. Patient now presents with abdominal distension and serous percutaneous cholecystostomy tube drainage concerning for ascites with associated mild dyspnea    - Percutaneous cholecystostomy tube appears well functioning  - will follow CT C/A/P results  - Plan discussed with Dr. Dougherty

## 2021-05-13 NOTE — ED PROVIDER NOTE - GASTROINTESTINAL, MLM
Abdomen soft, Minimal tenderness around gallbladder drain, dressing c/d/i. no discharge noted. Few CC serous fluid in drain

## 2021-05-13 NOTE — ED PROVIDER NOTE - PATIENT PORTAL LINK FT
You can access the FollowMyHealth Patient Portal offered by [facility] by registering at the following website: [email or web address]. By joining [facility]’s FollowMyHealth portal, you will also be able to view your health information using other applications (apps) compatible with our system.

## 2021-05-14 VITALS
DIASTOLIC BLOOD PRESSURE: 63 MMHG | SYSTOLIC BLOOD PRESSURE: 136 MMHG | TEMPERATURE: 98 F | RESPIRATION RATE: 19 BRPM | HEART RATE: 98 BPM | OXYGEN SATURATION: 98 %

## 2021-05-14 PROCEDURE — 86850 RBC ANTIBODY SCREEN: CPT

## 2021-05-14 PROCEDURE — 85025 COMPLETE CBC W/AUTO DIFF WBC: CPT

## 2021-05-14 PROCEDURE — 74177 CT ABD & PELVIS W/CONTRAST: CPT | Mod: 26,MA

## 2021-05-14 PROCEDURE — 84484 ASSAY OF TROPONIN QUANT: CPT

## 2021-05-14 PROCEDURE — 71275 CT ANGIOGRAPHY CHEST: CPT | Mod: 26,MA

## 2021-05-14 PROCEDURE — 80053 COMPREHEN METABOLIC PANEL: CPT

## 2021-05-14 PROCEDURE — 86900 BLOOD TYPING SEROLOGIC ABO: CPT

## 2021-05-14 PROCEDURE — 93005 ELECTROCARDIOGRAM TRACING: CPT

## 2021-05-14 PROCEDURE — 99284 EMERGENCY DEPT VISIT MOD MDM: CPT | Mod: 25

## 2021-05-14 PROCEDURE — 86901 BLOOD TYPING SEROLOGIC RH(D): CPT

## 2021-05-14 PROCEDURE — 85610 PROTHROMBIN TIME: CPT

## 2021-05-14 PROCEDURE — 71275 CT ANGIOGRAPHY CHEST: CPT

## 2021-05-14 PROCEDURE — 71045 X-RAY EXAM CHEST 1 VIEW: CPT

## 2021-05-14 PROCEDURE — 85730 THROMBOPLASTIN TIME PARTIAL: CPT

## 2021-05-14 PROCEDURE — 74177 CT ABD & PELVIS W/CONTRAST: CPT

## 2021-05-14 PROCEDURE — 83735 ASSAY OF MAGNESIUM: CPT

## 2021-05-14 PROCEDURE — 83880 ASSAY OF NATRIURETIC PEPTIDE: CPT

## 2021-05-14 PROCEDURE — 36415 COLL VENOUS BLD VENIPUNCTURE: CPT

## 2021-05-14 PROCEDURE — 83690 ASSAY OF LIPASE: CPT

## 2021-05-20 ENCOUNTER — NON-APPOINTMENT (OUTPATIENT)
Age: 71
End: 2021-05-20

## 2021-05-21 ENCOUNTER — APPOINTMENT (OUTPATIENT)
Dept: DISASTER EMERGENCY | Facility: CLINIC | Age: 71
End: 2021-05-21

## 2021-05-21 NOTE — PHYSICAL EXAM
[No Acute Distress] : no acute distress [Low Lying Soft Palate] : low lying soft palate [Enlarged Base of the Tongue] : enlarged base of the tongue [III] : Mallampati Class: III [Normal Appearance] : normal appearance [Neck Circumference: ___] : neck circumference: [unfilled] [No Neck Mass] : no neck mass [Normal Rate/Rhythm] : normal rate/rhythm [Normal S1, S2] : normal s1, s2 [No Murmurs] : no murmurs [No Resp Distress] : no resp distress [Clear to Auscultation Bilaterally] : clear to auscultation bilaterally [3+ Pitting] : 3+ pitting [Oriented x3] : oriented x3 [Normal Affect] : normal affect [TextBox_2] : obese

## 2021-05-21 NOTE — HISTORY OF PRESENT ILLNESS
[TextBox_4] : Obesity \par Wheezing\par Dyspnea \par Edema\par Post polio \par Wheelchair bound\par Cholecystotomy - await cholecystectomy\par CVA\par Diastolic HF\par Dysphagia and reflux \par Never smoker\par  smokes outside

## 2021-05-24 ENCOUNTER — APPOINTMENT (OUTPATIENT)
Dept: PULMONOLOGY | Facility: CLINIC | Age: 71
End: 2021-05-24
Payer: MEDICARE

## 2021-05-24 ENCOUNTER — APPOINTMENT (OUTPATIENT)
Dept: CARDIOLOGY | Facility: CLINIC | Age: 71
End: 2021-05-24

## 2021-05-24 DIAGNOSIS — Z23 ENCOUNTER FOR IMMUNIZATION: ICD-10-CM

## 2021-05-24 PROCEDURE — 99213 OFFICE O/P EST LOW 20 MIN: CPT | Mod: 95

## 2021-05-24 NOTE — ASSESSMENT
[FreeTextEntry1] : Wheeze related to GERD and diastolic HF\par Obesity\par MARINE not found ( minimal MARINE in REM sleep only)\par No significant pulmonary or sleep medicine related contraindication to GB surgery under general anesthesia\par \par

## 2021-05-24 NOTE — HISTORY OF PRESENT ILLNESS
[Home] : at home, [unfilled] , at the time of the visit. [Medical Office: ([facility])___] : at the medical office located in  [Verbal consent obtained from patient] : the patient, [unfilled] [TextBox_4] : Obesity \par Wheezing\par Dyspnea \par Edema\par Post polio \par Wheelchair bound\par Cholecystotomy - await cholecystectomy\par CVA\par Diastolic HF\par Dysphagia and reflux \par Never smoker\par  smokes outside \par \par 5/24/21\par Doing well\par Cholecystectomy planned soon - tolerated drain procedure without issue\par No "asthma" issue with GERD measures \par Sleeping well \par \par

## 2021-05-25 ENCOUNTER — OUTPATIENT (OUTPATIENT)
Dept: OUTPATIENT SERVICES | Facility: HOSPITAL | Age: 71
LOS: 1 days | End: 2021-05-25
Payer: MEDICARE

## 2021-05-25 VITALS
DIASTOLIC BLOOD PRESSURE: 66 MMHG | TEMPERATURE: 97 F | HEIGHT: 60 IN | WEIGHT: 179.02 LBS | RESPIRATION RATE: 16 BRPM | HEART RATE: 96 BPM | SYSTOLIC BLOOD PRESSURE: 108 MMHG

## 2021-05-25 DIAGNOSIS — Z01.818 ENCOUNTER FOR OTHER PREPROCEDURAL EXAMINATION: ICD-10-CM

## 2021-05-25 DIAGNOSIS — E78.5 HYPERLIPIDEMIA, UNSPECIFIED: ICD-10-CM

## 2021-05-25 DIAGNOSIS — Z29.9 ENCOUNTER FOR PROPHYLACTIC MEASURES, UNSPECIFIED: ICD-10-CM

## 2021-05-25 DIAGNOSIS — J45.909 UNSPECIFIED ASTHMA, UNCOMPLICATED: ICD-10-CM

## 2021-05-25 DIAGNOSIS — K81.9 CHOLECYSTITIS, UNSPECIFIED: ICD-10-CM

## 2021-05-25 DIAGNOSIS — I10 ESSENTIAL (PRIMARY) HYPERTENSION: ICD-10-CM

## 2021-05-25 LAB
A1C WITH ESTIMATED AVERAGE GLUCOSE RESULT: 5.6 % — SIGNIFICANT CHANGE UP (ref 4–5.6)
ALBUMIN SERPL ELPH-MCNC: 4 G/DL — SIGNIFICANT CHANGE UP (ref 3.3–5.2)
ALP SERPL-CCNC: 107 U/L — SIGNIFICANT CHANGE UP (ref 40–120)
ALT FLD-CCNC: 12 U/L — SIGNIFICANT CHANGE UP
AMYLASE P1 CFR SERPL: 79 U/L — SIGNIFICANT CHANGE UP (ref 36–128)
ANION GAP SERPL CALC-SCNC: 12 MMOL/L — SIGNIFICANT CHANGE UP (ref 5–17)
AST SERPL-CCNC: 12 U/L — SIGNIFICANT CHANGE UP
BILIRUB SERPL-MCNC: 0.3 MG/DL — LOW (ref 0.4–2)
BLD GP AB SCN SERPL QL: SIGNIFICANT CHANGE UP
BUN SERPL-MCNC: 15 MG/DL — SIGNIFICANT CHANGE UP (ref 8–20)
CALCIUM SERPL-MCNC: 9.5 MG/DL — SIGNIFICANT CHANGE UP (ref 8.6–10.2)
CHLORIDE SERPL-SCNC: 103 MMOL/L — SIGNIFICANT CHANGE UP (ref 98–107)
CO2 SERPL-SCNC: 23 MMOL/L — SIGNIFICANT CHANGE UP (ref 22–29)
CREAT SERPL-MCNC: 0.83 MG/DL — SIGNIFICANT CHANGE UP (ref 0.5–1.3)
ESTIMATED AVERAGE GLUCOSE: 114 MG/DL — SIGNIFICANT CHANGE UP (ref 68–114)
GLUCOSE SERPL-MCNC: 109 MG/DL — HIGH (ref 70–99)
HCT VFR BLD CALC: 38.4 % — SIGNIFICANT CHANGE UP (ref 34.5–45)
HGB BLD-MCNC: 12.3 G/DL — SIGNIFICANT CHANGE UP (ref 11.5–15.5)
LIDOCAIN IGE QN: 29 U/L — SIGNIFICANT CHANGE UP (ref 22–51)
MAGNESIUM SERPL-MCNC: 2.1 MG/DL — SIGNIFICANT CHANGE UP (ref 1.6–2.6)
MCHC RBC-ENTMCNC: 27.6 PG — SIGNIFICANT CHANGE UP (ref 27–34)
MCHC RBC-ENTMCNC: 32 GM/DL — SIGNIFICANT CHANGE UP (ref 32–36)
MCV RBC AUTO: 86.1 FL — SIGNIFICANT CHANGE UP (ref 80–100)
PHOSPHATE SERPL-MCNC: 2.9 MG/DL — SIGNIFICANT CHANGE UP (ref 2.4–4.7)
PLATELET # BLD AUTO: 535 K/UL — HIGH (ref 150–400)
POTASSIUM SERPL-MCNC: 3.6 MMOL/L — SIGNIFICANT CHANGE UP (ref 3.5–5.3)
POTASSIUM SERPL-SCNC: 3.6 MMOL/L — SIGNIFICANT CHANGE UP (ref 3.5–5.3)
PROT SERPL-MCNC: 8 G/DL — SIGNIFICANT CHANGE UP (ref 6.6–8.7)
RBC # BLD: 4.46 M/UL — SIGNIFICANT CHANGE UP (ref 3.8–5.2)
RBC # FLD: 15.7 % — HIGH (ref 10.3–14.5)
SODIUM SERPL-SCNC: 138 MMOL/L — SIGNIFICANT CHANGE UP (ref 135–145)
WBC # BLD: 10.69 K/UL — HIGH (ref 3.8–10.5)
WBC # FLD AUTO: 10.69 K/UL — HIGH (ref 3.8–10.5)

## 2021-05-25 PROCEDURE — G0463: CPT

## 2021-05-25 PROCEDURE — 93010 ELECTROCARDIOGRAM REPORT: CPT

## 2021-05-25 PROCEDURE — 93005 ELECTROCARDIOGRAM TRACING: CPT

## 2021-05-25 RX ORDER — MEGESTROL ACETATE 40 MG/ML
0 SUSPENSION ORAL
Qty: 0 | Refills: 0 | DISCHARGE

## 2021-05-25 RX ORDER — GABAPENTIN 400 MG/1
0 CAPSULE ORAL
Qty: 0 | Refills: 0 | DISCHARGE

## 2021-05-25 RX ORDER — AMLODIPINE BESYLATE 2.5 MG/1
0 TABLET ORAL
Qty: 0 | Refills: 0 | DISCHARGE

## 2021-05-25 RX ORDER — AMITRIPTYLINE HCL 25 MG
0 TABLET ORAL
Qty: 0 | Refills: 0 | DISCHARGE

## 2021-05-25 RX ORDER — FUROSEMIDE 40 MG
0 TABLET ORAL
Qty: 0 | Refills: 0 | DISCHARGE

## 2021-05-25 NOTE — H&P PST ADULT - NSICDXPROBLEM_GEN_ALL_CORE_FT
PROBLEM DIAGNOSES  Problem: Hypertension  Assessment and Plan: continue medications as instructed.     Problem: Hyperlipidemia  Assessment and Plan: continue medications as instructed.     Problem: Cholecystitis, unspecified  Assessment and Plan: Robotic cholecystectomy with intraoperative ICG by Dr. Dougherty on 6/9/21.Medical, pulmonary and cardiac clearance pending     Problem: Asthma  Assessment and Plan: continue medications as instructed.     Problem: Need for prophylactic measure  Assessment and Plan: Caprini Score 6 High risk,  Surgical team should assess /Strongly recommend pharmacological and mechanical measures for VTE prophylaxis

## 2021-05-25 NOTE — ASU PATIENT PROFILE, ADULT - LEARNING ASSESSMENT (PATIENT) ADDITIONAL COMMENTS
NP, Yraed Cagle, instructed pt on pre-op instructions/teaching, tips for safer surgery, pain management scale, pre-surgical infection prevention instructions, medical clearance pending, pt verbalized understanding of all instructions given.

## 2021-05-25 NOTE — H&P PST ADULT - HISTORY OF PRESENT ILLNESS
71 y Female who presented to PST today in a wheelchair states that she had severe pain in her  right side of her abdomen and was in ED and she had a percutaneous cholecystostomy tube put in she said she had severe infection  and gall stones, now she is scheduled for a Robotic cholecystectomy with intraoperative ICG by Dr. Dougherty on 6/9/21

## 2021-05-25 NOTE — H&P PST ADULT - NSICDXFAMILYHX_GEN_ALL_CORE_FT
FAMILY HISTORY:  Father  Still living? No  FH: hypertension, Age at diagnosis: Age Unknown  FH: liver cancer, Age at diagnosis: Age Unknown    Mother  Still living? No  FH: hypertension, Age at diagnosis: Age Unknown

## 2021-05-25 NOTE — H&P PST ADULT - NSANTHOSAYNRD_GEN_A_CORE
No. MARINE screening performed.  STOP BANG Legend: 0-2 = LOW Risk; 3-4 = INTERMEDIATE Risk; 5-8 = HIGH Risk

## 2021-05-25 NOTE — H&P PST ADULT - ASSESSMENT
71 y Female who presented to PST today in a wheelchair states that she had severe pain in her  right side of her abdomen and was in ED and she had a percutaneous cholecystostomy tube put in she said she had severe infection  and gall stones, now she is scheduled for a Robotic cholecystectomy with intraoperative ICG by Dr. Dougherty on 21.  Patient didn't bring the med list, she want us to call her daughter, left message for her daughter to call back then will give instructions about medication.  patient had Covid Vaccine , card in chart.   NASRAI VTE 2.0 SCORE [CLOT updated 2019]    AGE RELATED RISK FACTORS                                                       MOBILITY RELATED FACTORS  [ ] Age 41-60 years                                            (1 Point)                    [ ] Bed rest                                                        (1 Point)  [x ] Age: 61-74 years                                           (2 Points)                  [ ] Plaster cast                                                   (2 Points)  [ ] Age= 75 years                                              (3 Points)                    [ ] Bed bound for more than 72 hours                 (2 Points)    DISEASE RELATED RISK FACTORS                                               GENDER SPECIFIC FACTORS  [ ] Edema in the lower extremities                       (1 Point)              [ ] Pregnancy                                                     (1 Point)  [ ] Varicose veins                                               (1 Point)                     [ ] Post-partum < 6 weeks                                   (1 Point)             [x ] BMI > 25 Kg/m2                                            (1 Point)                     [ ] Hormonal therapy  or oral contraception          (1 Point)                 [ ] Sepsis (in the previous month)                        (1 Point)               [ ] History of pregnancy complications                 (1 point)  [ ] Pneumonia or serious lung disease                                               [ ] Unexplained or recurrent                     (1 Point)           (in the previous month)                               (1 Point)  [x ] Abnormal pulmonary function test                     (1 Point)                 SURGERY RELATED RISK FACTORS  [ ] Acute myocardial infarction                              (1 Point)               [ ]  Section                                             (1 Point)  [ ] Congestive heart failure (in the previous month)  (1 Point)      [ ] Minor surgery                                                  (1 Point)   [ ] Inflammatory bowel disease                             (1 Point)               [ ] Arthroscopic surgery                                        (2 Points)  [ ] Central venous access                                      (2 Points)                [x ] General surgery lasting more than 45 minutes (2 points)  [ ] Malignancy- Present or previous                   (2 Points)                [ ] Elective arthroplasty                                         (5 points)    [ ] Stroke (in the previous month)                          (5 Points)                                                                                                                                                           HEMATOLOGY RELATED FACTORS                                                 TRAUMA RELATED RISK FACTORS  [ ] Prior episodes of VTE                                     (3 Points)                [ ] Fracture of the hip, pelvis, or leg                       (5 Points)  [ ] Positive family history for VTE                         (3 Points)             [ ] Acute spinal cord injury (in the previous month)  (5 Points)  [ ] Prothrombin 45809 A                                     (3 Points)               [ ] Paralysis  (less than 1 month)                             (5 Points)  [ ] Factor V Leiden                                             (3 Points)                  [ ] Multiple Trauma within 1 month                        (5 Points)  [ ] Lupus anticoagulants                                     (3 Points)                                                           [ ] Anticardiolipin antibodies                               (3 Points)                                                       [ ] High homocysteine in the blood                      (3 Points)                                             [ ] Other congenital or acquired thrombophilia      (3 Points)                                                [ ] Heparin induced thrombocytopenia                  (3 Points)                                     Total Score [   6       ]    OPIOID RISK TOOL    NELSON EACH BOX THAT APPLIES AND ADD TOTALS AT THE END    FAMILY HISTORY OF SUBSTANCE ABUSE                 FEMALE         MALE                                                Alcohol                             [  ]1 pt          [  ]3pts                                               Illegal Drugs                     [  ]2 pts        [  ]3pts                                               Rx Drugs                           [  ]4 pts        [  ]4 pts    PERSONAL HISTORY OF SUBSTANCE ABUSE                                                                                          Alcohol                             [  ]3 pts       [  ]3 pts                                               Illegal Drugs                     [  ]4 pts        [  ]4 pts                                               Rx Drugs                           [  ]5 pts        [  ]5 pts    AGE BETWEEN 16-45 YEARS                                      [  ]1 pt         [  ]1 pt    HISTORY OF PREADOLESCENT   SEXUAL ABUSE                                                             [  ]3 pts        [  ]0pts    PSYCHOLOGICAL DISEASE                     ADD, OCD, Bipolar, Schizophrenia        [  ]2 pts         [  ]2 pts                      Depression                                               [  ]1 pt           [  ]1 pt           SCORING TOTAL   (add numbers and type here)              (  0)                                     A score of 3 or lower indicated LOW risk for future opioid abuse  A score of 4 to 7 indicated moderate risk for future opioid abuse  A score of 8 or higher indicates a high risk for opioid abuse

## 2021-05-25 NOTE — ASU PATIENT PROFILE, ADULT - TEACHING/LEARNING DEVELOPMENTAL CONSIDERATIONS
2/25/2021 4:39 PM 
 
Mr. Arroyo [address] [place] [address] 
 
 
2/25/2021 Name: Christopher Dutton MRN: [id number] YOB: 2007 Date of Visit: 2/25/2021 Dear Dr. Yessenia Red, Rocael Lockett, DO,  
 
I had the opportunity to see your patient, Christopher Dutton, age 14 y.o. in the Pediatric Gastroenterology office on 2/25/2021 for evaluation of his: 1. Elevated bilirubin Today's visit included: 
 
Impression Christopher Dutton is 14 y. o. with elevated indirect serum bilirubin likely related to Gilbert's. Normal AST/ALT/Alk phos reassuring. He is otherwise healthy with no red flag signs or symptoms negative family history. Plan/Recommendation Labs today: hepatic panel consistent with prior labs, Gilbert's testing also ordered. If positive for Gilbert's, then consider no further testing If negative, then Dr. Sophie Ochoa may order additional testing such as U/S or additional labs Thank you very much for allowing me to participate in [facility]. Please do not hesitate to contact our office with any questions or concerns.   
 
 
 
 
Sincerely, 
 
 
Wander Durbin MD 
 

none

## 2021-05-25 NOTE — H&P PST ADULT - ATTENDING COMMENTS
No change to patient condition  Plan for robotic cholecystectomy  Risks/benefits discussed. Pt understands, agrees, and wishes to proceed. All questions answered. As of 6/9/21, pt still taking plavix  Will cancel today's planned case and reschedule  Pt to see her cardiologist and PCP   Change in plan discussed with patient and her daughter.

## 2021-05-25 NOTE — H&P PST ADULT - NSICDXPASTMEDICALHX_GEN_ALL_CORE_FT
PAST MEDICAL HISTORY:  Asthma     Hyperlipidemia     Hypertension     Polio     Stroke 2006, right side weakness

## 2021-06-09 ENCOUNTER — APPOINTMENT (OUTPATIENT)
Dept: SURGERY | Facility: HOSPITAL | Age: 71
End: 2021-06-09

## 2021-06-09 ENCOUNTER — OUTPATIENT (OUTPATIENT)
Dept: OUTPATIENT SERVICES | Facility: HOSPITAL | Age: 71
LOS: 1 days | End: 2021-06-09
Payer: MEDICARE

## 2021-06-15 DIAGNOSIS — K81.9 CHOLECYSTITIS, UNSPECIFIED: ICD-10-CM

## 2021-06-15 PROBLEM — I10 ESSENTIAL (PRIMARY) HYPERTENSION: Chronic | Status: ACTIVE | Noted: 2021-05-25

## 2021-06-15 PROBLEM — I63.9 CEREBRAL INFARCTION, UNSPECIFIED: Chronic | Status: ACTIVE | Noted: 2021-05-25

## 2021-06-15 PROBLEM — E78.5 HYPERLIPIDEMIA, UNSPECIFIED: Chronic | Status: ACTIVE | Noted: 2021-05-25

## 2021-06-15 PROBLEM — A80.9 ACUTE POLIOMYELITIS, UNSPECIFIED: Chronic | Status: ACTIVE | Noted: 2021-05-25

## 2021-06-15 PROCEDURE — 36415 COLL VENOUS BLD VENIPUNCTURE: CPT

## 2021-06-16 NOTE — PHYSICAL EXAM
[General Appearance - Well Developed] : well developed [General Appearance - Well Nourished] : well nourished [Normal Conjunctiva] : the conjunctiva exhibited no abnormalities [Respiration, Rhythm And Depth] : normal respiratory rhythm and effort [Auscultation Breath Sounds / Voice Sounds] : lungs were clear to auscultation bilaterally [Heart Rate And Rhythm] : heart rate and rhythm were normal [Heart Sounds] : normal S1 and S2 [Murmurs] : no murmurs present [Bowel Sounds] : normal bowel sounds [Abdomen Soft] : soft [Cyanosis, Localized] : no localized cyanosis [] : no rash [FreeTextEntry1] : Used Wheel chair no

## 2021-06-16 NOTE — ADDENDUM
[FreeTextEntry1] : 6/16/2021\par \par Ms. HURT has a normal echocardiogram and normal nuclear stress test.  Patient's event monitor x 1 month did not reveal AFib. She is low to intermediate risk for perioperative cardiac events following her gallbladder surgery. She may hold Plavix for 5 days prior to the procedure and resume asap. \par \par NO ABSOLUTE CONTRAINDICATIONS TO PROCEED WITH THE GALLBLADDER SURGERY.\par \par Mitch Memoracion, NP

## 2021-06-16 NOTE — ASSESSMENT
[FreeTextEntry1] : Sinus  Rhythm \par -Prominent R(V1) -nonspecific. \par  -Nonspecific ST depression   +   Negative T-waves. \par \par ABNORMAL \par \par Echocardiogram April 1, 2021: LVEF 60 to 65%.  Mild concentric LVH.\par Pharmacological nuclear stress test April 1, 2021: Normal study.\par \par Assessment:\par 1.  Exertional dyspnea\par 2.  Chest pain\par 3.  History of PAF-event monitor in progress\par 4.  History of a stroke with residual right-sided weakness\par 5.  History of diastolic CHF\par 6.  Hypertension/hyperlipidemia and other problems as noted\par 7.  Gallbladder disease-needs preoperative cardiac assessment\par \par Recommendations:\par \par Patient has a normal echocardiogram and normal nuclear stress test.  Patient's event monitor is in progress.\par Patient has multiple comorbidities, patient is low to intermediate risk for perioperative cardiac events following her gallbladder surgery.\par \par If patient is found to have atrial fibrillation on the event monitor patient needs to be fully anticoagulated.\par \par 1.  Continue current medical therapy\par 2.  Follow-up in 6 weeks\par 3.  If patient has PAF on event monitor will need to start on full anticoagulation.\par \par Patient is low to intermediate risk for perioperative cardiac events because of her underlying comorbidities.\par \par NO ABSOLUTE CONTRAINDICATIONS TO PROCEED WITH THE GALLBLADDER SURGERY.\par \par \par

## 2021-06-16 NOTE — HISTORY OF PRESENT ILLNESS
[FreeTextEntry1] : Patient is a 70-year-old  female with a history of diastolic CHF, hypertension, hyperlipidemia, obesity, history of poliomyelitis history of a stroke and she has a uterine mass.  Patient has ongoing gallbladder problem.  Patient was admitted to [facility] in December 2020.    Patient's stroke has affected the right side of the body which is also the same side that she had polio.\par \par Patient presents for a preoperative cardiac assessment for abdominal surgery for gallbladder problem.  Patient has exertional dyspnea with less than 10 steps.  Patient is confined to wheelchair.  Patient also complains of chest tightness in the center of the chest.  Patient is having shortness of breath and chest pain for several months.  Patient denies orthopnea, PND or leg edema.  Patient denies any palpitations.\par \par \par Patient has no prior history of CAD or myocardial infarction.  No history of prior cardiac work-up like a stress test or a cardiac catheterization.  \par \par Since last visit patient had an echocardiogram and nuclear stress test.  Patient's event monitor is in progress.\par \par Patient accompanied by her daughter.  She was in the examination room.

## 2021-06-25 ENCOUNTER — OUTPATIENT (OUTPATIENT)
Dept: OUTPATIENT SERVICES | Facility: HOSPITAL | Age: 71
LOS: 1 days | End: 2021-06-25
Payer: MEDICARE

## 2021-06-25 VITALS
SYSTOLIC BLOOD PRESSURE: 138 MMHG | HEIGHT: 60 IN | DIASTOLIC BLOOD PRESSURE: 70 MMHG | HEART RATE: 98 BPM | WEIGHT: 266.54 LBS | TEMPERATURE: 98 F | RESPIRATION RATE: 18 BRPM

## 2021-06-25 DIAGNOSIS — I50.9 HEART FAILURE, UNSPECIFIED: ICD-10-CM

## 2021-06-25 DIAGNOSIS — Z01.818 ENCOUNTER FOR OTHER PREPROCEDURAL EXAMINATION: ICD-10-CM

## 2021-06-25 DIAGNOSIS — Z29.9 ENCOUNTER FOR PROPHYLACTIC MEASURES, UNSPECIFIED: ICD-10-CM

## 2021-06-25 DIAGNOSIS — Z87.09 PERSONAL HISTORY OF OTHER DISEASES OF THE RESPIRATORY SYSTEM: ICD-10-CM

## 2021-06-25 DIAGNOSIS — K81.9 CHOLECYSTITIS, UNSPECIFIED: ICD-10-CM

## 2021-06-25 LAB
A1C WITH ESTIMATED AVERAGE GLUCOSE RESULT: 5.7 % — HIGH (ref 4–5.6)
AMYLASE P1 CFR SERPL: 94 U/L — SIGNIFICANT CHANGE UP (ref 36–128)
ANION GAP SERPL CALC-SCNC: 13 MMOL/L — SIGNIFICANT CHANGE UP (ref 5–17)
BASOPHILS # BLD AUTO: 0.06 K/UL — SIGNIFICANT CHANGE UP (ref 0–0.2)
BASOPHILS NFR BLD AUTO: 0.6 % — SIGNIFICANT CHANGE UP (ref 0–2)
BLD GP AB SCN SERPL QL: SIGNIFICANT CHANGE UP
BUN SERPL-MCNC: 13.5 MG/DL — SIGNIFICANT CHANGE UP (ref 8–20)
CALCIUM SERPL-MCNC: 9.4 MG/DL — SIGNIFICANT CHANGE UP (ref 8.6–10.2)
CHLORIDE SERPL-SCNC: 102 MMOL/L — SIGNIFICANT CHANGE UP (ref 98–107)
CO2 SERPL-SCNC: 22 MMOL/L — SIGNIFICANT CHANGE UP (ref 22–29)
CREAT SERPL-MCNC: 0.77 MG/DL — SIGNIFICANT CHANGE UP (ref 0.5–1.3)
EOSINOPHIL # BLD AUTO: 0.19 K/UL — SIGNIFICANT CHANGE UP (ref 0–0.5)
EOSINOPHIL NFR BLD AUTO: 1.8 % — SIGNIFICANT CHANGE UP (ref 0–6)
ESTIMATED AVERAGE GLUCOSE: 117 MG/DL — HIGH (ref 68–114)
GLUCOSE SERPL-MCNC: 99 MG/DL — SIGNIFICANT CHANGE UP (ref 70–99)
HCT VFR BLD CALC: 42 % — SIGNIFICANT CHANGE UP (ref 34.5–45)
HGB BLD-MCNC: 12.8 G/DL — SIGNIFICANT CHANGE UP (ref 11.5–15.5)
IMM GRANULOCYTES NFR BLD AUTO: 0.6 % — SIGNIFICANT CHANGE UP (ref 0–1.5)
LIDOCAIN IGE QN: 29 U/L — SIGNIFICANT CHANGE UP (ref 22–51)
LYMPHOCYTES # BLD AUTO: 2.73 K/UL — SIGNIFICANT CHANGE UP (ref 1–3.3)
LYMPHOCYTES # BLD AUTO: 25.3 % — SIGNIFICANT CHANGE UP (ref 13–44)
MCHC RBC-ENTMCNC: 27.2 PG — SIGNIFICANT CHANGE UP (ref 27–34)
MCHC RBC-ENTMCNC: 30.5 GM/DL — LOW (ref 32–36)
MCV RBC AUTO: 89.2 FL — SIGNIFICANT CHANGE UP (ref 80–100)
MONOCYTES # BLD AUTO: 0.75 K/UL — SIGNIFICANT CHANGE UP (ref 0–0.9)
MONOCYTES NFR BLD AUTO: 7 % — SIGNIFICANT CHANGE UP (ref 2–14)
NEUTROPHILS # BLD AUTO: 6.99 K/UL — SIGNIFICANT CHANGE UP (ref 1.8–7.4)
NEUTROPHILS NFR BLD AUTO: 64.7 % — SIGNIFICANT CHANGE UP (ref 43–77)
PLATELET # BLD AUTO: 531 K/UL — HIGH (ref 150–400)
POTASSIUM SERPL-MCNC: 4.6 MMOL/L — SIGNIFICANT CHANGE UP (ref 3.5–5.3)
POTASSIUM SERPL-SCNC: 4.6 MMOL/L — SIGNIFICANT CHANGE UP (ref 3.5–5.3)
RBC # BLD: 4.71 M/UL — SIGNIFICANT CHANGE UP (ref 3.8–5.2)
RBC # FLD: 15 % — HIGH (ref 10.3–14.5)
SODIUM SERPL-SCNC: 137 MMOL/L — SIGNIFICANT CHANGE UP (ref 135–145)
WBC # BLD: 10.79 K/UL — HIGH (ref 3.8–10.5)
WBC # FLD AUTO: 10.79 K/UL — HIGH (ref 3.8–10.5)

## 2021-06-25 PROCEDURE — G0463: CPT

## 2021-06-25 RX ORDER — BUPIVACAINE 13.3 MG/ML
20 INJECTION, SUSPENSION, LIPOSOMAL INFILTRATION ONCE
Refills: 0 | Status: DISCONTINUED | OUTPATIENT
Start: 2021-07-02 | End: 2021-07-16

## 2021-06-25 NOTE — H&P PST ADULT - ATTENDING COMMENTS
No change to patient condition  Plan for robotic cholecystectomy   Risks/benefits discussed with patient. She understands, agrees, and wishes to proceed. All questions answered.

## 2021-06-25 NOTE — H&P PST ADULT - NSICDXPROBLEM_GEN_ALL_CORE_FT
PROBLEM DIAGNOSES  Problem: Need for prophylactic measure  Assessment and Plan: Moderate risk surgical team to determine prophylactic intervention     Problem: History of dyspnea  Assessment and Plan: F/u with Pulmonologist Dr. Bentley for routine management     Problem: Heart failure  Assessment and Plan: f/u with cardiologist for routine management     Problem: Cholecystitis  Assessment and Plan: Robotic cholecystectomy with intraoperative ICE F/u with PCP for medical evaluation and clearance

## 2021-06-25 NOTE — H&P PST ADULT - NSICDXPASTMEDICALHX_GEN_ALL_CORE_FT
PAST MEDICAL HISTORY:  Asthma     Hyperlipidemia     Hypertension     Polio     Stroke 2006, right side weakness     PAST MEDICAL HISTORY:  Afib Pt not on any medication, had monitor x1 month  did not reveal afib April to May 2021    At risk for sleep apnea     H/O gastroesophageal reflux (GERD)     History of dyspnea     Hyperlipidemia     Hypertension     Morbidly obese BMI 52.1    Polio     Stroke 2006, right side weakness

## 2021-06-25 NOTE — H&P PST ADULT - EKG AND INTERPRETATION
EKG  retrieved from [facility] dated 5/26/21, Normal sinus rhythm, no acute change.

## 2021-06-25 NOTE — H&P PST ADULT - OTHER CARE PROVIDERS
[place], [place] [facility] [phone]. Pulmonologist Sheree PCP Lashell [phone], cardiologist Michelle  [phone]. Pulmonologist Sheree

## 2021-06-25 NOTE — H&P PST ADULT - HISTORY OF PRESENT ILLNESS
71 y Female who presented to PST today in a wheelchair states that she had severe pain in her  right side of her abdomen and was in ED and she had a percutaneous cholecystostomy tube put in she said she had severe infection  and gall stones, now she is scheduled for a Robotic cholecystectomy with intraoperative ICG by Dr. Dougherty on 7/2/21   72 y/o female pt of size, with CVA 2006 with right sided weakness, HTN, HLD, Hypothyroidism, Heart Failure, Constipation, anxiety, Dyspnea, GERD, Afib( pt had monitor on x1 month did not reveal AFIB) with Robotic cholecystectomy with intraoperative ICG for cholecystitis. Pt states she was seen in [facility] ED  for severe right upper quadrant pain, had imaging done that revealed cholecystitis, had percutaneous drain  placed and was asked to follow up as an out patient with surgeon in [place].  Pt was later referred to  Dr. Dougherty in [facility] due to insurance issues. Pt today denies fever/chills, nausea and emesis. Report intermittent right upper quadrant pain persist, worse at the site of the percutaneous cholecystectomy tube. Seen today for a scheduled surgery on 7/2/21.

## 2021-06-25 NOTE — H&P PST ADULT - ASSESSMENT
72 y/o female pt of size, with Stroke  with right sided weakness, HTN, HLD, Hypothyroidism, Heart Failure, Constipation, anxiety, Dyspnea, GERD, Afib( pt had monitor on x1 month did not reveal AFIB) with Robotic cholecystectomy with intraoperative ICG for cholecystitis. Pt states she was seen in [facility] ED for severe right upper quadrant pain, had imaging done that revealed cholecystitis, had percutaneous drain  placed and was asked to follow up as an out patient with surgeon in [place].  Pt was later referred to  Dr. Dougherty in [facility] due to insurance issues. Pt today denies fever/chills, nausea and emesis. Report intermittent right upper quadrant pain persist, worse at the site of the percutaneous cholecystectomy tube. Seen today for a scheduled surgery on 21.  Surgery protocol reviewed with pt today, pt states her last day of Plavix is 21 as per her PCP instruction. Cardiologist and pulmonologist received from office today. Pt to follow-up with PCP for clearance   CAPRINI VTE 2.0 SCORE [CLOT updated 2019]    AGE RELATED RISK FACTORS                                                       MOBILITY RELATED FACTORS  [ ] Age 41-60 years                                            (1 Point)                    [ ] Bed rest                                                        (1 Point)  [x ] Age: 61-74 years                                           (2 Points)                  [ ] Plaster cast                                                   (2 Points)  [ ] Age= 75 years                                              (3 Points)                    [ ] Bed bound for more than 72 hours                 (2 Points)    DISEASE RELATED RISK FACTORS                                               GENDER SPECIFIC FACTORS  [ x] Edema in the lower extremities                       (1 Point)              [ ] Pregnancy                                                     (1 Point)  [ ] Varicose veins                                               (1 Point)                     [ ] Post-partum < 6 weeks                                   (1 Point)             [x ] BMI > 25 Kg/m2                                            (1 Point)                     [ ] Hormonal therapy  or oral contraception          (1 Point)                 [ ] Sepsis (in the previous month)                        (1 Point)               [ ] History of pregnancy complications                 (1 point)  [ ] Pneumonia or serious lung disease                                               [ ] Unexplained or recurrent                     (1 Point)           (in the previous month)                               (1 Point)  [ ] Abnormal pulmonary function test                     (1 Point)                 SURGERY RELATED RISK FACTORS  [ ] Acute myocardial infarction                              (1 Point)               [ ]  Section                                             (1 Point)  [ ] Congestive heart failure (in the previous month)  (1 Point)      [ ] Minor surgery                                                  (1 Point)   [ ] Inflammatory bowel disease                             (1 Point)               [ ] Arthroscopic surgery                                        (2 Points)  [ ] Central venous access                                      (2 Points)                [x ] General surgery lasting more than 45 minutes (2 points)  [ ] Malignancy- Present or previous                   (2 Points)                [ ] Elective arthroplasty                                         (5 points)    [ ] Stroke (in the previous month)                          (5 Points)                                                                                                                                                           HEMATOLOGY RELATED FACTORS                                                 TRAUMA RELATED RISK FACTORS  [ ] Prior episodes of VTE                                     (3 Points)                [ ] Fracture of the hip, pelvis, or leg                       (5 Points)  [ ] Positive family history for VTE                         (3 Points)             [ ] Acute spinal cord injury (in the previous month)  (5 Points)  [ ] Prothrombin 12634 A                                     (3 Points)               [ ] Paralysis  (less than 1 month)                             (5 Points)  [ ] Factor V Leiden                                             (3 Points)                  [ ] Multiple Trauma within 1 month                        (5 Points)  [ ] Lupus anticoagulants                                     (3 Points)                                                           [ ] Anticardiolipin antibodies                               (3 Points)                                                       [ ] High homocysteine in the blood                      (3 Points)                                             [ ] Other congenital or acquired thrombophilia      (3 Points)                                                [ ] Heparin induced thrombocytopenia                  (3 Points)                                     Total Score [   6       ]  OPIOID RISK TOOL    NELSON EACH BOX THAT APPLIES AND ADD TOTALS AT THE END    FAMILY HISTORY OF SUBSTANCE ABUSE                 FEMALE         MALE                                                Alcohol                             [  ]1 pt          [  ]3pts                                               Illegal Durgs                     [  ]2 pts        [  ]3pts                                               Rx Drugs                           [  ]4 pts        [  ]4 pts    PERSONAL HISTORY OF SUBSTANCE ABUSE                                                                                          Alcohol                             [  ]3 pts       [  ]3 pts                                               Illegal Drugs                     [  ]4 pts        [  ]4 pts                                               Rx Drugs                           [  ]5 pts        [  ]5 pts    AGE BETWEEN 16-45 YEARS                                      [  ]1 pt         [  ]1 pt    HISTORY OF PREADOLESCENT   SEXUAL ABUSE                                                             [  ]3 pts        [  ]0pts    PSYCHOLOGICAL DISEASE                     ADD, OCD, Bipolar, Schizophrenia        [  ]2 pts         [  ]2 pts                      Depression                                               [x  ]1 pt           [  ]1 pt           SCORING TOTAL   (add numbers and type here)              (**1*)                                     A score of 3 or lower indicated LOW risk for future opioid abuse  A score of 4 to 7 indicated moderate risk for future opioid abuse  A score of 8 or higher indicates a high risk for opioid abuse

## 2021-07-01 ENCOUNTER — OUTPATIENT (OUTPATIENT)
Dept: OUTPATIENT SERVICES | Facility: HOSPITAL | Age: 71
LOS: 1 days | End: 2021-07-01
Payer: MEDICARE

## 2021-07-01 ENCOUNTER — TRANSCRIPTION ENCOUNTER (OUTPATIENT)
Age: 71
End: 2021-07-01

## 2021-07-02 ENCOUNTER — OUTPATIENT (OUTPATIENT)
Dept: OUTPATIENT SERVICES | Facility: HOSPITAL | Age: 71
LOS: 1 days | End: 2021-07-02
Payer: MEDICARE

## 2021-07-02 ENCOUNTER — INPATIENT (INPATIENT)
Facility: HOSPITAL | Age: 71
LOS: 7 days | Discharge: HOME | DRG: 856 | End: 2021-07-10
Attending: SURGERY | Admitting: SURGERY
Payer: MEDICARE

## 2021-07-02 ENCOUNTER — RESULT REVIEW (OUTPATIENT)
Age: 71
End: 2021-07-02

## 2021-07-02 ENCOUNTER — TRANSCRIPTION ENCOUNTER (OUTPATIENT)
Age: 71
End: 2021-07-02

## 2021-07-02 ENCOUNTER — APPOINTMENT (OUTPATIENT)
Dept: SURGERY | Facility: HOSPITAL | Age: 71
End: 2021-07-02

## 2021-07-02 VITALS
RESPIRATION RATE: 16 BRPM | DIASTOLIC BLOOD PRESSURE: 82 MMHG | HEART RATE: 116 BPM | HEIGHT: 60 IN | SYSTOLIC BLOOD PRESSURE: 141 MMHG | OXYGEN SATURATION: 100 % | WEIGHT: 244.05 LBS | TEMPERATURE: 99 F

## 2021-07-02 VITALS
RESPIRATION RATE: 16 BRPM | HEIGHT: 60 IN | TEMPERATURE: 98 F | WEIGHT: 244.05 LBS | SYSTOLIC BLOOD PRESSURE: 141 MMHG | HEART RATE: 79 BPM | DIASTOLIC BLOOD PRESSURE: 89 MMHG | OXYGEN SATURATION: 100 %

## 2021-07-02 VITALS
OXYGEN SATURATION: 96 % | DIASTOLIC BLOOD PRESSURE: 72 MMHG | HEART RATE: 90 BPM | RESPIRATION RATE: 20 BRPM | SYSTOLIC BLOOD PRESSURE: 145 MMHG

## 2021-07-02 DIAGNOSIS — K81.9 CHOLECYSTITIS, UNSPECIFIED: ICD-10-CM

## 2021-07-02 DIAGNOSIS — Z90.49 ACQUIRED ABSENCE OF OTHER SPECIFIED PARTS OF DIGESTIVE TRACT: Chronic | ICD-10-CM

## 2021-07-02 LAB — GLUCOSE BLDC GLUCOMTR-MCNC: 79 MG/DL — SIGNIFICANT CHANGE UP (ref 70–99)

## 2021-07-02 PROCEDURE — 99285 EMERGENCY DEPT VISIT HI MDM: CPT

## 2021-07-02 PROCEDURE — S2900: CPT

## 2021-07-02 PROCEDURE — 47563 LAPARO CHOLECYSTECTOMY/GRAPH: CPT | Mod: 22

## 2021-07-02 PROCEDURE — 88304 TISSUE EXAM BY PATHOLOGIST: CPT | Mod: 26

## 2021-07-02 PROCEDURE — 71045 X-RAY EXAM CHEST 1 VIEW: CPT | Mod: 26

## 2021-07-02 PROCEDURE — 94640 AIRWAY INHALATION TREATMENT: CPT

## 2021-07-02 PROCEDURE — 47562 LAPAROSCOPIC CHOLECYSTECTOMY: CPT

## 2021-07-02 PROCEDURE — S2900 ROBOTIC SURGICAL SYSTEM: CPT | Mod: NC

## 2021-07-02 PROCEDURE — 88304 TISSUE EXAM BY PATHOLOGIST: CPT

## 2021-07-02 PROCEDURE — 82962 GLUCOSE BLOOD TEST: CPT

## 2021-07-02 PROCEDURE — 73030 X-RAY EXAM OF SHOULDER: CPT | Mod: 26,RT

## 2021-07-02 PROCEDURE — C1889: CPT

## 2021-07-02 PROCEDURE — 47563 LAPARO CHOLECYSTECTOMY/GRAPH: CPT | Mod: AS,22

## 2021-07-02 RX ORDER — ACETAMINOPHEN 500 MG
975 TABLET ORAL ONCE
Refills: 0 | Status: DISCONTINUED | OUTPATIENT
Start: 2021-07-02 | End: 2021-07-02

## 2021-07-02 RX ORDER — SODIUM CHLORIDE 9 MG/ML
1400 INJECTION INTRAMUSCULAR; INTRAVENOUS; SUBCUTANEOUS ONCE
Refills: 0 | Status: DISCONTINUED | OUTPATIENT
Start: 2021-07-02 | End: 2021-07-02

## 2021-07-02 RX ORDER — FOLIC ACID 0.8 MG
1 TABLET ORAL
Qty: 0 | Refills: 0 | DISCHARGE

## 2021-07-02 RX ORDER — ACETAMINOPHEN 500 MG
2 TABLET ORAL
Qty: 0 | Refills: 0 | DISCHARGE

## 2021-07-02 RX ORDER — FENTANYL CITRATE 50 UG/ML
25 INJECTION INTRAVENOUS
Refills: 0 | Status: DISCONTINUED | OUTPATIENT
Start: 2021-07-02 | End: 2021-07-02

## 2021-07-02 RX ORDER — ONDANSETRON 8 MG/1
4 TABLET, FILM COATED ORAL ONCE
Refills: 0 | Status: DISCONTINUED | OUTPATIENT
Start: 2021-07-02 | End: 2021-07-02

## 2021-07-02 RX ORDER — CIPROFLOXACIN LACTATE 400MG/40ML
400 VIAL (ML) INTRAVENOUS ONCE
Refills: 0 | Status: COMPLETED | OUTPATIENT
Start: 2021-07-02 | End: 2021-07-02

## 2021-07-02 RX ORDER — OXYCODONE AND ACETAMINOPHEN 5; 325 MG/1; MG/1
1 TABLET ORAL ONCE
Refills: 0 | Status: DISCONTINUED | OUTPATIENT
Start: 2021-07-02 | End: 2021-07-02

## 2021-07-02 RX ORDER — SIMVASTATIN 20 MG/1
1 TABLET, FILM COATED ORAL
Qty: 0 | Refills: 0 | DISCHARGE

## 2021-07-02 RX ORDER — IPRATROPIUM/ALBUTEROL SULFATE 18-103MCG
3 AEROSOL WITH ADAPTER (GRAM) INHALATION
Refills: 0 | Status: COMPLETED | OUTPATIENT
Start: 2021-07-02 | End: 2021-07-02

## 2021-07-02 RX ORDER — HYDROMORPHONE HYDROCHLORIDE 2 MG/ML
0.5 INJECTION INTRAMUSCULAR; INTRAVENOUS; SUBCUTANEOUS
Refills: 0 | Status: DISCONTINUED | OUTPATIENT
Start: 2021-07-02 | End: 2021-07-02

## 2021-07-02 RX ORDER — CLOPIDOGREL BISULFATE 75 MG/1
0 TABLET, FILM COATED ORAL
Qty: 0 | Refills: 0 | DISCHARGE

## 2021-07-02 RX ORDER — SODIUM CHLORIDE 9 MG/ML
1000 INJECTION, SOLUTION INTRAVENOUS
Refills: 0 | Status: DISCONTINUED | OUTPATIENT
Start: 2021-07-02 | End: 2021-07-02

## 2021-07-02 RX ORDER — GABAPENTIN 400 MG/1
300 CAPSULE ORAL ONCE
Refills: 0 | Status: COMPLETED | OUTPATIENT
Start: 2021-07-02 | End: 2021-07-02

## 2021-07-02 RX ORDER — ACETAMINOPHEN 500 MG
1000 TABLET ORAL ONCE
Refills: 0 | Status: COMPLETED | OUTPATIENT
Start: 2021-07-02 | End: 2021-07-02

## 2021-07-02 RX ORDER — CELECOXIB 200 MG/1
400 CAPSULE ORAL ONCE
Refills: 0 | Status: COMPLETED | OUTPATIENT
Start: 2021-07-02 | End: 2021-07-02

## 2021-07-02 RX ORDER — IBUPROFEN 200 MG
600 TABLET ORAL ONCE
Refills: 0 | Status: COMPLETED | OUTPATIENT
Start: 2021-07-02 | End: 2021-07-02

## 2021-07-02 RX ORDER — CEFTRIAXONE 500 MG/1
1000 INJECTION, POWDER, FOR SOLUTION INTRAMUSCULAR; INTRAVENOUS ONCE
Refills: 0 | Status: DISCONTINUED | OUTPATIENT
Start: 2021-07-02 | End: 2021-07-02

## 2021-07-02 RX ORDER — ACETAMINOPHEN 500 MG
975 TABLET ORAL ONCE
Refills: 0 | Status: COMPLETED | OUTPATIENT
Start: 2021-07-02 | End: 2021-07-02

## 2021-07-02 RX ORDER — INDOCYANINE GREEN 25 MG
2.5 KIT INTRAVASCULAR; INTRAVENOUS ONCE
Refills: 0 | Status: COMPLETED | OUTPATIENT
Start: 2021-07-02 | End: 2021-07-02

## 2021-07-02 RX ORDER — AMITRIPTYLINE HCL 25 MG
1 TABLET ORAL
Qty: 0 | Refills: 0 | DISCHARGE

## 2021-07-02 RX ORDER — PREGABALIN 225 MG/1
1 CAPSULE ORAL
Qty: 0 | Refills: 0 | DISCHARGE

## 2021-07-02 RX ORDER — SENNA PLUS 8.6 MG/1
1 TABLET ORAL
Qty: 0 | Refills: 0 | DISCHARGE

## 2021-07-02 RX ORDER — DOCUSATE SODIUM 100 MG
2 CAPSULE ORAL
Qty: 0 | Refills: 0 | DISCHARGE

## 2021-07-02 RX ORDER — SODIUM CHLORIDE 9 MG/ML
3 INJECTION INTRAMUSCULAR; INTRAVENOUS; SUBCUTANEOUS ONCE
Refills: 0 | Status: DISCONTINUED | OUTPATIENT
Start: 2021-07-02 | End: 2021-07-02

## 2021-07-02 RX ORDER — CHOLECALCIFEROL (VITAMIN D3) 125 MCG
0 CAPSULE ORAL
Qty: 0 | Refills: 0 | DISCHARGE

## 2021-07-02 RX ORDER — IPRATROPIUM/ALBUTEROL SULFATE 18-103MCG
3 AEROSOL WITH ADAPTER (GRAM) INHALATION ONCE
Refills: 0 | Status: COMPLETED | OUTPATIENT
Start: 2021-07-02 | End: 2021-07-02

## 2021-07-02 RX ORDER — ALBUTEROL 90 UG/1
0 AEROSOL, METERED ORAL
Qty: 0 | Refills: 0 | DISCHARGE

## 2021-07-02 RX ORDER — METOPROLOL TARTRATE 50 MG
0 TABLET ORAL
Qty: 0 | Refills: 0 | DISCHARGE

## 2021-07-02 RX ADMIN — FENTANYL CITRATE 25 MICROGRAM(S): 50 INJECTION INTRAVENOUS at 11:06

## 2021-07-02 RX ADMIN — Medication 125 MILLIGRAM(S): at 20:26

## 2021-07-02 RX ADMIN — HYDROMORPHONE HYDROCHLORIDE 0.5 MILLIGRAM(S): 2 INJECTION INTRAMUSCULAR; INTRAVENOUS; SUBCUTANEOUS at 13:35

## 2021-07-02 RX ADMIN — OXYCODONE AND ACETAMINOPHEN 1 TABLET(S): 5; 325 TABLET ORAL at 20:26

## 2021-07-02 RX ADMIN — FENTANYL CITRATE 25 MICROGRAM(S): 50 INJECTION INTRAVENOUS at 11:07

## 2021-07-02 RX ADMIN — FENTANYL CITRATE 25 MICROGRAM(S): 50 INJECTION INTRAVENOUS at 10:55

## 2021-07-02 RX ADMIN — OXYCODONE AND ACETAMINOPHEN 1 TABLET(S): 5; 325 TABLET ORAL at 02:56

## 2021-07-02 RX ADMIN — Medication 600 MILLIGRAM(S): at 14:17

## 2021-07-02 RX ADMIN — Medication 3 MILLILITER(S): at 20:56

## 2021-07-02 RX ADMIN — GABAPENTIN 300 MILLIGRAM(S): 400 CAPSULE ORAL at 06:14

## 2021-07-02 RX ADMIN — FENTANYL CITRATE 25 MICROGRAM(S): 50 INJECTION INTRAVENOUS at 11:01

## 2021-07-02 RX ADMIN — HYDROMORPHONE HYDROCHLORIDE 0.5 MILLIGRAM(S): 2 INJECTION INTRAMUSCULAR; INTRAVENOUS; SUBCUTANEOUS at 13:06

## 2021-07-02 RX ADMIN — FENTANYL CITRATE 25 MICROGRAM(S): 50 INJECTION INTRAVENOUS at 11:00

## 2021-07-02 RX ADMIN — Medication 3 MILLILITER(S): at 20:27

## 2021-07-02 RX ADMIN — Medication 100 MILLIGRAM(S): at 07:30

## 2021-07-02 RX ADMIN — Medication 3 MILLILITER(S): at 12:15

## 2021-07-02 RX ADMIN — CELECOXIB 400 MILLIGRAM(S): 200 CAPSULE ORAL at 06:14

## 2021-07-02 RX ADMIN — FENTANYL CITRATE 25 MICROGRAM(S): 50 INJECTION INTRAVENOUS at 11:37

## 2021-07-02 RX ADMIN — FENTANYL CITRATE 25 MICROGRAM(S): 50 INJECTION INTRAVENOUS at 10:50

## 2021-07-02 RX ADMIN — INDOCYANINE GREEN 2.5 MILLIGRAM(S): KIT INTRAVASCULAR; INTRAVENOUS at 06:52

## 2021-07-02 RX ADMIN — Medication 200 MILLIGRAM(S): at 07:00

## 2021-07-02 RX ADMIN — Medication 3 MILLILITER(S): at 21:07

## 2021-07-02 RX ADMIN — FENTANYL CITRATE 25 MICROGRAM(S): 50 INJECTION INTRAVENOUS at 10:56

## 2021-07-02 RX ADMIN — Medication 600 MILLIGRAM(S): at 14:47

## 2021-07-02 NOTE — ED ADULT NURSE NOTE - PMH
Afib  Pt not on any medication, had monitor x1 month  did not reveal afib April to May 2021  At risk for sleep apnea    H/O gastroesophageal reflux (GERD)    Heart failure    History of dyspnea    Hyperlipidemia    Hypertension    Morbidly obese  BMI 52.1  Polio    Stroke  2006, right side weakness  Wheelchair bound

## 2021-07-02 NOTE — ED ADULT TRIAGE NOTE - CHIEF COMPLAINT QUOTE
had gallbladder surgery this AM. comes to ed reporting shortness of breath and feeling chills. tachycardiac 116; getting rectal temp

## 2021-07-02 NOTE — ED ADULT NURSE NOTE - OBJECTIVE STATEMENT
Pt states she had cholecystectomy today here at [facility] and mentioned she was very Sob after the procedure, pt was given an albuterol trx and had improvement and went home. Pt then reports going to lie back at home and became suddenly short of breath again. Pt denies any chest pain at this time and states sitting up helped resolve her symptoms. Pt denies any abd pain, chesty pain or SOB at time of RN assessment. Pt noted to be sinus tach on CM and afebrile.

## 2021-07-02 NOTE — ED ADULT NURSE NOTE - NSIMPLEMENTINTERV_GEN_ALL_ED
Implemented All Fall with Harm Risk Interventions:  [place] to call system. Call bell, personal items and telephone within reach. Instruct patient to call for assistance. Room bathroom lighting operational. Non-slip footwear when patient is off stretcher. Physically safe environment: no spills, clutter or unnecessary equipment. Stretcher in lowest position, wheels locked, appropriate side rails in place. Provide visual cue, wrist band, yellow gown, etc. Monitor gait and stability. Monitor for mental status changes and reorient to person, place, and time. Review medications for side effects contributing to fall risk. Reinforce activity limits and safety measures with patient and family. Provide visual clues: red socks.

## 2021-07-02 NOTE — ED PROVIDER NOTE - PHYSICAL EXAMINATION
Const: Awake, alert and oriented. In no acute distress.   HEENT: NC/AT. Moist mucous membranes.  Eyes: No scleral icterus. EOMI.  Neck:. Soft and supple. Full ROM without pain.  Cardiac: Tachycardic rate and regular rhythm. +S1/S2. Peripheral pulses 2+ and symmetric. No LE edema.  Resp: Speaking in full sentences. No evidence of respiratory distress. No wheezes, rales or rhonchi.  Abd: Soft, non-tender, non-distended. Normal bowel sounds in all 4 quadrants. No guarding or rebound.  Back: Spine midline and non-tender. No CVAT.  Musculoskeletal: Difficulty ranging right shoulder secondary to pain. TTP over right shoulder.   Skin: No rashes, abrasions or lacerations.  Lymph: No cervical lymphadenopathy.  Neuro: Awake, alert & oriented x 3. Moves all extremities symmetrically. Const: Awake, alert and oriented. In no acute distress.   HEENT: NC/AT. Moist mucous membranes.  Eyes: No scleral icterus. EOMI.  Neck:. Soft and supple. Full ROM without pain.  Cardiac: Tachycardic rate and regular rhythm. +S1/S2. Peripheral pulses 2+ and symmetric. No LE edema.  Resp: Speaking in full sentences. No evidence of respiratory distress.   Abd: Soft, non-tender, non-distended. Normal bowel sounds in all 4 quadrants. No guarding or rebound.  Back: Spine midline and non-tender. No CVAT.  Musculoskeletal: Difficulty ranging right shoulder secondary to pain. TTP over right shoulder.   Skin: No rashes, abrasions or lacerations.  Lymph: No cervical lymphadenopathy.  Neuro: Awake, alert & oriented x 3. Moves all extremities symmetrically.

## 2021-07-02 NOTE — ED PROVIDER NOTE - CLINICAL SUMMARY MEDICAL DECISION MAKING FREE TEXT BOX
Patient s/p laparoscopic cholecystectomy earlier today presents with palpitations, SOB. Will obtain CT scan w IV contrast. Patient s/p laparoscopic cholecystectomy earlier today presents with SOB. Likely attributable to post-anesthesia. Will administer nebulizer treatments, Solumedrol, Percocet for pain control. Monitor and reassess.

## 2021-07-02 NOTE — BRIEF OPERATIVE NOTE - OPERATION/FINDINGS
Chronic cholecystitis with cholecystostomy tube in place. A collection of pus was found above the gallbladder by the tract of the cholecystostomy tube. It was irrigated with copious saline. Critical view was obtained and clips were applied to both the cystic duct and cystic artery. Due to difficulty of the case a 5mm assist port was placed for laparoscopic assistance.

## 2021-07-02 NOTE — ASU PREOP CHECKLIST - CHLOROHEXIDINE WASH 1
Bautistaerbeca Woodruff  1954  [id number]    Situation:  Verbal report received from: Joe Austin rn  Procedure: Procedure(s):  COLONOSCOPY  ENDOSCOPIC POLYPECTOMY    Background:    Preoperative diagnosis: SCREENING  Postoperative diagnosis: Polyps, Diverticulosis, Internal Hemorrhoids    :  Dr. Ginnie Dakins  Assistant(s): Endoscopy Technician-1: Ayana Maza  Endoscopy RN-1: Krista HILL    Specimens:   ID Type Source Tests Collected by Time Destination   1 : Ascending Colon Polyp Preservative Colon, Ascending  Dionna Garcia MD 5/22/2020 0674 Pathology   2 : Mid Transverse Colon Polyp Preservative Colon, Transverse  Dionna Garcia MD 5/22/2020 0818 Pathology     H. Pylori  no    Assessment:  Intra-procedure medications   Anesthesia gave intra-procedure sedation and medications, see anesthesia flow sheet yes    Intravenous fluids: NS@ KVO     Vital signs stable  yes    Abdominal assessment: round and soft  yes    Recommendation:  Discharge patient per MD order yes.   Family or Friend  yes  Permission to share finding with family or friend yes 30-Jun-2021

## 2021-07-02 NOTE — ASU DISCHARGE PLAN (ADULT/PEDIATRIC) - ASU DC SPECIAL INSTRUCTIONSFT
The dressing over the old drain site can be removed 48h post-operatively. The surgical sites were all dressed with dermabond which does not need to be removed.     Continue physical therapy  Continue aid The dressing over the old drain site can be removed 48h post-operatively. The surgical sites were all dressed with dermabond which does not need to be removed.     Prescription for physical therapy provided

## 2021-07-02 NOTE — ASU DISCHARGE PLAN (ADULT/PEDIATRIC) - CARE PROVIDER_API CALL
Zenon Dougherty)  Surgery  Bariatric  [address] [place] [address] [id number]  Phone: [phone]  Fax: [phone]  Follow Up Time: 2 weeks

## 2021-07-02 NOTE — ED PROVIDER NOTE - CARE PLAN
Principal Discharge DX:	Abdominal pain, unspecified abdominal location  Secondary Diagnosis:	Tachycardia

## 2021-07-02 NOTE — ED PROVIDER NOTE - ATTENDING CONTRIBUTION TO CARE
70 yo F presents to ED s/p being dc'd from [facility] for agustin sneedy by Dr. Dougherty c/o feeling SOB.  Pt with reported hx of asthma, but states she never was really treated for it.  apparently pt felt similar s/s after waking up from anesthesia post-op and was given neb tx and subsequently dc'd home.  Pt denies ever having general anesthesia in the past.  Pt also c/o R shoulder pain after being helped out of the car by family.  On exam pt mildly tachycardic on monitor, awake and in no acute resp distress.  Neck supple, Cor Reg, Lungs with slight decrease BS b/l bases, Abd soft with generalized tenderness to palp, Ext fROM, Neuro nonfocal.  will check EKG, CXR and re-eval after nebs and IV steroids and d/w Surgery

## 2021-07-02 NOTE — ED PROVIDER NOTE - OBJECTIVE STATEMENT
72 yo female history of CVA, HTN, HLD, Hypothyroidism, CHF, Anxiety, GERD, AFIB presents s/p laparoscopic cholecystectomy performed by Dr. Dougherty earlier today (7/2/21) with sudden onset SOB, palpitations that began roughly two hours ago. She is endorsing mild chest pain at this time. Patient states that she was initially SOB following surgery, was given nebulizer treatments in the PACU and improved. She was discharged home this afternoon. Suddenly around 0500 this afternoon, patient developed sudden onset SOB and palpitations, prompting her to come to the ED. Additionally, patient feels that she feels lightheaded at this time. The patient states that en route to the ED, she hit her right shoulder and is experiencing difficulty moving her right shoulder secondary to pain. The patient denies fever, cough, abdominal pain, urinary symptoms, diarrhea, weakness/numbness/tingling in extremities, LOC. 70 yo female history of Asthma, CVA, HTN, HLD, Hypothyroidism, CHF, Anxiety, GERD, AFIB presents s/p laparoscopic cholecystectomy performed by Dr. Dougherty earlier today (7/2/21) with sudden onset SOB, palpitations that began roughly two hours ago. She is endorsing mild chest pain at this time. Patient states that she was initially SOB following surgery, was given nebulizer treatments in the PACU and improved. She was discharged home this afternoon. Suddenly around 0500 this afternoon, patient developed sudden onset SOB, prompting her to come to the ED. Additionally, patient feels that she feels lightheaded at this time. The patient states that en route to the ED, she hit her right shoulder and is experiencing difficulty moving her right shoulder secondary to pain. The patient denies fever, cough, chest pain, abdominal pain, urinary symptoms, diarrhea, weakness/numbness/tingling in extremities, LOC.

## 2021-07-02 NOTE — ED PROVIDER NOTE - PROGRESS NOTE DETAILS
Shauna: Medically necessary for patient to receive IV contrast during her CT studies to investigate for pulmonary embolus and other abdominal pathology. Shauna: Medically necessary for patient to receive IV contrast during her CT studies to investigate for pulmonary embolus and other abdominal pathology. Patient needs emergent CT, cannot wait for laboratory studies. received s/o this morning. patient tachycardic to 130s and hypertensive to SBP 200s here with abd pain. surgery to admit patient under attending Dr. Benson

## 2021-07-03 DIAGNOSIS — R10.9 UNSPECIFIED ABDOMINAL PAIN: ICD-10-CM

## 2021-07-03 PROBLEM — E66.01 MORBID (SEVERE) OBESITY DUE TO EXCESS CALORIES: Chronic | Status: ACTIVE | Noted: 2021-06-25

## 2021-07-03 PROBLEM — Z91.89 OTHER SPECIFIED PERSONAL RISK FACTORS, NOT ELSEWHERE CLASSIFIED: Chronic | Status: ACTIVE | Noted: 2021-06-25

## 2021-07-03 PROBLEM — Z87.09 PERSONAL HISTORY OF OTHER DISEASES OF THE RESPIRATORY SYSTEM: Chronic | Status: ACTIVE | Noted: 2021-06-25

## 2021-07-03 PROBLEM — I48.91 UNSPECIFIED ATRIAL FIBRILLATION: Chronic | Status: ACTIVE | Noted: 2021-06-25

## 2021-07-03 PROBLEM — I50.9 HEART FAILURE, UNSPECIFIED: Chronic | Status: ACTIVE | Noted: 2021-06-25

## 2021-07-03 PROBLEM — Z99.3 DEPENDENCE ON WHEELCHAIR: Chronic | Status: ACTIVE | Noted: 2021-06-25

## 2021-07-03 PROBLEM — Z87.19 PERSONAL HISTORY OF OTHER DISEASES OF THE DIGESTIVE SYSTEM: Chronic | Status: ACTIVE | Noted: 2021-06-25

## 2021-07-03 LAB
ALBUMIN SERPL ELPH-MCNC: 3.9 G/DL — SIGNIFICANT CHANGE UP (ref 3.3–5.2)
ALP SERPL-CCNC: 107 U/L — SIGNIFICANT CHANGE UP (ref 40–120)
ALT FLD-CCNC: 53 U/L — HIGH
ANION GAP SERPL CALC-SCNC: 13 MMOL/L — SIGNIFICANT CHANGE UP (ref 5–17)
ANION GAP SERPL CALC-SCNC: 14 MMOL/L — SIGNIFICANT CHANGE UP (ref 5–17)
ANION GAP SERPL CALC-SCNC: 20 MMOL/L — HIGH (ref 5–17)
ANION GAP SERPL CALC-SCNC: 26 MMOL/L — HIGH (ref 5–17)
APPEARANCE UR: CLEAR — SIGNIFICANT CHANGE UP
AST SERPL-CCNC: 43 U/L — HIGH
BACTERIA # UR AUTO: ABNORMAL
BASE EXCESS BLDV CALC-SCNC: -12.1 MMOL/L — LOW (ref -2–3)
BASOPHILS # BLD AUTO: 0.03 K/UL — SIGNIFICANT CHANGE UP (ref 0–0.2)
BASOPHILS NFR BLD AUTO: 0.2 % — SIGNIFICANT CHANGE UP (ref 0–2)
BILIRUB SERPL-MCNC: 0.4 MG/DL — SIGNIFICANT CHANGE UP (ref 0.4–2)
BILIRUB UR-MCNC: NEGATIVE — SIGNIFICANT CHANGE UP
BLD GP AB SCN SERPL QL: SIGNIFICANT CHANGE UP
BUN SERPL-MCNC: 10.5 MG/DL — SIGNIFICANT CHANGE UP (ref 8–20)
BUN SERPL-MCNC: 9.1 MG/DL — SIGNIFICANT CHANGE UP (ref 8–20)
BUN SERPL-MCNC: 9.2 MG/DL — SIGNIFICANT CHANGE UP (ref 8–20)
BUN SERPL-MCNC: 9.7 MG/DL — SIGNIFICANT CHANGE UP (ref 8–20)
CA-I SERPL-SCNC: 1.19 MMOL/L — SIGNIFICANT CHANGE UP (ref 1.15–1.33)
CALCIUM SERPL-MCNC: 8.4 MG/DL — LOW (ref 8.6–10.2)
CALCIUM SERPL-MCNC: 8.4 MG/DL — LOW (ref 8.6–10.2)
CALCIUM SERPL-MCNC: 9.2 MG/DL — SIGNIFICANT CHANGE UP (ref 8.6–10.2)
CALCIUM SERPL-MCNC: 9.4 MG/DL — SIGNIFICANT CHANGE UP (ref 8.6–10.2)
CHLORIDE BLDV-SCNC: 103 MMOL/L — SIGNIFICANT CHANGE UP (ref 98–107)
CHLORIDE SERPL-SCNC: 100 MMOL/L — SIGNIFICANT CHANGE UP (ref 98–107)
CHLORIDE SERPL-SCNC: 101 MMOL/L — SIGNIFICANT CHANGE UP (ref 98–107)
CHLORIDE SERPL-SCNC: 103 MMOL/L — SIGNIFICANT CHANGE UP (ref 98–107)
CHLORIDE SERPL-SCNC: 97 MMOL/L — LOW (ref 98–107)
CO2 SERPL-SCNC: 11 MMOL/L — LOW (ref 22–29)
CO2 SERPL-SCNC: 14 MMOL/L — LOW (ref 22–29)
CO2 SERPL-SCNC: 18 MMOL/L — LOW (ref 22–29)
CO2 SERPL-SCNC: 22 MMOL/L — SIGNIFICANT CHANGE UP (ref 22–29)
COLOR SPEC: YELLOW — SIGNIFICANT CHANGE UP
CREAT SERPL-MCNC: 0.63 MG/DL — SIGNIFICANT CHANGE UP (ref 0.5–1.3)
CREAT SERPL-MCNC: 0.73 MG/DL — SIGNIFICANT CHANGE UP (ref 0.5–1.3)
CREAT SERPL-MCNC: 0.85 MG/DL — SIGNIFICANT CHANGE UP (ref 0.5–1.3)
CREAT SERPL-MCNC: 0.98 MG/DL — SIGNIFICANT CHANGE UP (ref 0.5–1.3)
DIFF PNL FLD: ABNORMAL
EOSINOPHIL # BLD AUTO: 0 K/UL — SIGNIFICANT CHANGE UP (ref 0–0.5)
EOSINOPHIL NFR BLD AUTO: 0 % — SIGNIFICANT CHANGE UP (ref 0–6)
EPI CELLS # UR: SIGNIFICANT CHANGE UP
GAS PNL BLDA: SIGNIFICANT CHANGE UP
GAS PNL BLDV: 133 MMOL/L — LOW (ref 136–145)
GAS PNL BLDV: SIGNIFICANT CHANGE UP
GLUCOSE BLDV-MCNC: 182 MG/DL — HIGH (ref 70–99)
GLUCOSE SERPL-MCNC: 117 MG/DL — HIGH (ref 70–99)
GLUCOSE SERPL-MCNC: 167 MG/DL — HIGH (ref 70–99)
GLUCOSE SERPL-MCNC: 169 MG/DL — HIGH (ref 70–99)
GLUCOSE SERPL-MCNC: 189 MG/DL — HIGH (ref 70–99)
GLUCOSE UR QL: 100 MG/DL
HCO3 BLDV-SCNC: 15 MMOL/L — LOW (ref 22–29)
HCT VFR BLD CALC: 29 % — LOW (ref 34.5–45)
HCT VFR BLD CALC: 33.6 % — LOW (ref 34.5–45)
HCT VFR BLD CALC: 37.8 % — SIGNIFICANT CHANGE UP (ref 34.5–45)
HCT VFR BLDA CALC: 35 % — SIGNIFICANT CHANGE UP (ref 34–46)
HGB BLD CALC-MCNC: 11.8 G/DL — SIGNIFICANT CHANGE UP (ref 11.7–16.1)
HGB BLD-MCNC: 10.9 G/DL — LOW (ref 11.5–15.5)
HGB BLD-MCNC: 12 G/DL — SIGNIFICANT CHANGE UP (ref 11.5–15.5)
HGB BLD-MCNC: 9.4 G/DL — LOW (ref 11.5–15.5)
IMM GRANULOCYTES NFR BLD AUTO: 0.6 % — SIGNIFICANT CHANGE UP (ref 0–1.5)
IMM GRANULOCYTES NFR BLD AUTO: 1.7 % — HIGH (ref 0–1.5)
IMM GRANULOCYTES NFR BLD AUTO: 1.8 % — HIGH (ref 0–1.5)
KETONES UR-MCNC: ABNORMAL
LACTATE BLDV-MCNC: 7.6 MMOL/L — CRITICAL HIGH (ref 0.5–2)
LACTATE BLDV-MCNC: 7.7 MMOL/L — CRITICAL HIGH (ref 0.5–2)
LACTATE BLDV-MCNC: 9.8 MMOL/L — CRITICAL HIGH (ref 0.5–2)
LACTATE SERPL-SCNC: 1.3 MMOL/L — SIGNIFICANT CHANGE UP (ref 0.5–2)
LEUKOCYTE ESTERASE UR-ACNC: ABNORMAL
LYMPHOCYTES # BLD AUTO: 0.61 K/UL — LOW (ref 1–3.3)
LYMPHOCYTES # BLD AUTO: 1.28 K/UL — SIGNIFICANT CHANGE UP (ref 1–3.3)
LYMPHOCYTES # BLD AUTO: 1.65 K/UL — SIGNIFICANT CHANGE UP (ref 1–3.3)
LYMPHOCYTES # BLD AUTO: 3.9 % — LOW (ref 13–44)
LYMPHOCYTES # BLD AUTO: 7.9 % — LOW (ref 13–44)
LYMPHOCYTES # BLD AUTO: 8.4 % — LOW (ref 13–44)
MAGNESIUM SERPL-MCNC: 1.8 MG/DL — SIGNIFICANT CHANGE UP (ref 1.8–2.6)
MAGNESIUM SERPL-MCNC: 1.9 MG/DL — SIGNIFICANT CHANGE UP (ref 1.6–2.6)
MANUAL SMEAR VERIFICATION: SIGNIFICANT CHANGE UP
MCHC RBC-ENTMCNC: 27.7 PG — SIGNIFICANT CHANGE UP (ref 27–34)
MCHC RBC-ENTMCNC: 27.8 PG — SIGNIFICANT CHANGE UP (ref 27–34)
MCHC RBC-ENTMCNC: 28.2 PG — SIGNIFICANT CHANGE UP (ref 27–34)
MCHC RBC-ENTMCNC: 31.7 GM/DL — LOW (ref 32–36)
MCHC RBC-ENTMCNC: 32.4 GM/DL — SIGNIFICANT CHANGE UP (ref 32–36)
MCHC RBC-ENTMCNC: 32.4 GM/DL — SIGNIFICANT CHANGE UP (ref 32–36)
MCV RBC AUTO: 85.3 FL — SIGNIFICANT CHANGE UP (ref 80–100)
MCV RBC AUTO: 87.1 FL — SIGNIFICANT CHANGE UP (ref 80–100)
MCV RBC AUTO: 87.5 FL — SIGNIFICANT CHANGE UP (ref 80–100)
MONOCYTES # BLD AUTO: 0.67 K/UL — SIGNIFICANT CHANGE UP (ref 0–0.9)
MONOCYTES # BLD AUTO: 1.34 K/UL — HIGH (ref 0–0.9)
MONOCYTES # BLD AUTO: 2.21 K/UL — HIGH (ref 0–0.9)
MONOCYTES NFR BLD AUTO: 11.2 % — SIGNIFICANT CHANGE UP (ref 2–14)
MONOCYTES NFR BLD AUTO: 4.3 % — SIGNIFICANT CHANGE UP (ref 2–14)
MONOCYTES NFR BLD AUTO: 8.3 % — SIGNIFICANT CHANGE UP (ref 2–14)
NEUTROPHILS # BLD AUTO: 13.25 K/UL — HIGH (ref 1.8–7.4)
NEUTROPHILS # BLD AUTO: 14.3 K/UL — HIGH (ref 1.8–7.4)
NEUTROPHILS # BLD AUTO: 15.41 K/UL — HIGH (ref 1.8–7.4)
NEUTROPHILS NFR BLD AUTO: 78.4 % — HIGH (ref 43–77)
NEUTROPHILS NFR BLD AUTO: 81.9 % — HIGH (ref 43–77)
NEUTROPHILS NFR BLD AUTO: 91 % — HIGH (ref 43–77)
NITRITE UR-MCNC: NEGATIVE — SIGNIFICANT CHANGE UP
NT-PROBNP SERPL-SCNC: 1995 PG/ML — HIGH (ref 0–300)
PCO2 BLDV: 31 MMHG — LOW (ref 39–42)
PH BLDV: 7.28 — LOW (ref 7.32–7.43)
PH UR: 5 — SIGNIFICANT CHANGE UP (ref 5–8)
PHOSPHATE SERPL-MCNC: 1.6 MG/DL — LOW (ref 2.4–4.7)
PHOSPHATE SERPL-MCNC: 2.7 MG/DL — SIGNIFICANT CHANGE UP (ref 2.4–4.7)
PLAT MORPH BLD: NORMAL — SIGNIFICANT CHANGE UP
PLATELET # BLD AUTO: 475 K/UL — HIGH (ref 150–400)
PLATELET # BLD AUTO: 560 K/UL — HIGH (ref 150–400)
PLATELET # BLD AUTO: 580 K/UL — HIGH (ref 150–400)
PO2 BLDV: 102 MMHG — HIGH (ref 25–45)
POLYCHROMASIA BLD QL SMEAR: SLIGHT — SIGNIFICANT CHANGE UP
POTASSIUM BLDV-SCNC: 3.9 MMOL/L — SIGNIFICANT CHANGE UP (ref 3.5–5.1)
POTASSIUM SERPL-MCNC: 3.5 MMOL/L — SIGNIFICANT CHANGE UP (ref 3.5–5.3)
POTASSIUM SERPL-MCNC: 3.6 MMOL/L — SIGNIFICANT CHANGE UP (ref 3.5–5.3)
POTASSIUM SERPL-MCNC: 3.8 MMOL/L — SIGNIFICANT CHANGE UP (ref 3.5–5.3)
POTASSIUM SERPL-MCNC: 3.9 MMOL/L — SIGNIFICANT CHANGE UP (ref 3.5–5.3)
POTASSIUM SERPL-SCNC: 3.5 MMOL/L — SIGNIFICANT CHANGE UP (ref 3.5–5.3)
POTASSIUM SERPL-SCNC: 3.6 MMOL/L — SIGNIFICANT CHANGE UP (ref 3.5–5.3)
POTASSIUM SERPL-SCNC: 3.8 MMOL/L — SIGNIFICANT CHANGE UP (ref 3.5–5.3)
POTASSIUM SERPL-SCNC: 3.9 MMOL/L — SIGNIFICANT CHANGE UP (ref 3.5–5.3)
PROT SERPL-MCNC: 7.9 G/DL — SIGNIFICANT CHANGE UP (ref 6.6–8.7)
PROT UR-MCNC: 100 MG/DL
RBC # BLD: 3.33 M/UL — LOW (ref 3.8–5.2)
RBC # BLD: 3.94 M/UL — SIGNIFICANT CHANGE UP (ref 3.8–5.2)
RBC # BLD: 4.32 M/UL — SIGNIFICANT CHANGE UP (ref 3.8–5.2)
RBC # FLD: 15 % — HIGH (ref 10.3–14.5)
RBC # FLD: 15.2 % — HIGH (ref 10.3–14.5)
RBC # FLD: 15.6 % — HIGH (ref 10.3–14.5)
RBC BLD AUTO: ABNORMAL
RBC CASTS # UR COMP ASSIST: ABNORMAL /HPF (ref 0–4)
SAO2 % BLDV: 98.4 % — SIGNIFICANT CHANGE UP
SARS-COV-2 RNA SPEC QL NAA+PROBE: SIGNIFICANT CHANGE UP
SODIUM SERPL-SCNC: 134 MMOL/L — LOW (ref 135–145)
SODIUM SERPL-SCNC: 134 MMOL/L — LOW (ref 135–145)
SODIUM SERPL-SCNC: 135 MMOL/L — SIGNIFICANT CHANGE UP (ref 135–145)
SODIUM SERPL-SCNC: 136 MMOL/L — SIGNIFICANT CHANGE UP (ref 135–145)
SP GR SPEC: 1.02 — SIGNIFICANT CHANGE UP (ref 1.01–1.02)
TROPONIN T SERPL-MCNC: 0.02 NG/ML — SIGNIFICANT CHANGE UP (ref 0–0.06)
UROBILINOGEN FLD QL: NEGATIVE MG/DL — SIGNIFICANT CHANGE UP
WBC # BLD: 15.71 K/UL — HIGH (ref 3.8–10.5)
WBC # BLD: 16.17 K/UL — HIGH (ref 3.8–10.5)
WBC # BLD: 19.65 K/UL — HIGH (ref 3.8–10.5)
WBC # FLD AUTO: 15.71 K/UL — HIGH (ref 3.8–10.5)
WBC # FLD AUTO: 16.17 K/UL — HIGH (ref 3.8–10.5)
WBC # FLD AUTO: 19.65 K/UL — HIGH (ref 3.8–10.5)
WBC UR QL: SIGNIFICANT CHANGE UP

## 2021-07-03 PROCEDURE — 71045 X-RAY EXAM CHEST 1 VIEW: CPT | Mod: 26

## 2021-07-03 PROCEDURE — 49320 DIAG LAPARO SEPARATE PROC: CPT | Mod: 78

## 2021-07-03 PROCEDURE — 99024 POSTOP FOLLOW-UP VISIT: CPT

## 2021-07-03 PROCEDURE — 71275 CT ANGIOGRAPHY CHEST: CPT | Mod: 26,ME

## 2021-07-03 PROCEDURE — 74177 CT ABD & PELVIS W/CONTRAST: CPT | Mod: 26,ME

## 2021-07-03 PROCEDURE — 99291 CRITICAL CARE FIRST HOUR: CPT | Mod: 24

## 2021-07-03 PROCEDURE — G1004: CPT

## 2021-07-03 PROCEDURE — 93010 ELECTROCARDIOGRAM REPORT: CPT

## 2021-07-03 RX ORDER — FENTANYL CITRATE 50 UG/ML
50 INJECTION INTRAVENOUS ONCE
Refills: 0 | Status: DISCONTINUED | OUTPATIENT
Start: 2021-07-03 | End: 2021-07-03

## 2021-07-03 RX ORDER — METRONIDAZOLE 500 MG
500 TABLET ORAL ONCE
Refills: 0 | Status: COMPLETED | OUTPATIENT
Start: 2021-07-03 | End: 2021-07-03

## 2021-07-03 RX ORDER — LEVOTHYROXINE SODIUM 125 MCG
37.5 TABLET ORAL AT BEDTIME
Refills: 0 | Status: DISCONTINUED | OUTPATIENT
Start: 2021-07-03 | End: 2021-07-04

## 2021-07-03 RX ORDER — METOPROLOL TARTRATE 50 MG
5 TABLET ORAL EVERY 6 HOURS
Refills: 0 | Status: DISCONTINUED | OUTPATIENT
Start: 2021-07-03 | End: 2021-07-03

## 2021-07-03 RX ORDER — PANTOPRAZOLE SODIUM 20 MG/1
40 TABLET, DELAYED RELEASE ORAL DAILY
Refills: 0 | Status: DISCONTINUED | OUTPATIENT
Start: 2021-07-03 | End: 2021-07-04

## 2021-07-03 RX ORDER — ENOXAPARIN SODIUM 100 MG/ML
40 INJECTION SUBCUTANEOUS DAILY
Refills: 0 | Status: DISCONTINUED | OUTPATIENT
Start: 2021-07-03 | End: 2021-07-10

## 2021-07-03 RX ORDER — TETRACAINE/BENZOCAINE/BUTAMBEN 2%-14%-2%
1 OINTMENT (GRAM) TOPICAL ONCE
Refills: 0 | Status: COMPLETED | OUTPATIENT
Start: 2021-07-03 | End: 2021-07-03

## 2021-07-03 RX ORDER — METRONIDAZOLE 500 MG
500 TABLET ORAL EVERY 8 HOURS
Refills: 0 | Status: DISCONTINUED | OUTPATIENT
Start: 2021-07-03 | End: 2021-07-05

## 2021-07-03 RX ORDER — CLOPIDOGREL BISULFATE 75 MG/1
0 TABLET, FILM COATED ORAL
Qty: 0 | Refills: 0 | DISCHARGE
Start: 2021-07-03

## 2021-07-03 RX ORDER — MAGNESIUM SULFATE 500 MG/ML
2 VIAL (ML) INJECTION ONCE
Refills: 0 | Status: COMPLETED | OUTPATIENT
Start: 2021-07-03 | End: 2021-07-03

## 2021-07-03 RX ORDER — METRONIDAZOLE 500 MG
500 TABLET ORAL ONCE
Refills: 0 | Status: DISCONTINUED | OUTPATIENT
Start: 2021-07-03 | End: 2021-07-03

## 2021-07-03 RX ORDER — METRONIDAZOLE 500 MG
500 TABLET ORAL THREE TIMES A DAY
Refills: 0 | Status: DISCONTINUED | OUTPATIENT
Start: 2021-07-03 | End: 2021-07-03

## 2021-07-03 RX ORDER — LABETALOL HCL 100 MG
10 TABLET ORAL ONCE
Refills: 0 | Status: COMPLETED | OUTPATIENT
Start: 2021-07-03 | End: 2021-07-03

## 2021-07-03 RX ORDER — CLOPIDOGREL BISULFATE 75 MG/1
1 TABLET, FILM COATED ORAL
Qty: 0 | Refills: 0 | DISCHARGE
Start: 2021-07-03

## 2021-07-03 RX ORDER — HYDROMORPHONE HYDROCHLORIDE 2 MG/ML
1 INJECTION INTRAMUSCULAR; INTRAVENOUS; SUBCUTANEOUS ONCE
Refills: 0 | Status: DISCONTINUED | OUTPATIENT
Start: 2021-07-03 | End: 2021-07-03

## 2021-07-03 RX ORDER — METOPROLOL TARTRATE 50 MG
5 TABLET ORAL EVERY 4 HOURS
Refills: 0 | Status: DISCONTINUED | OUTPATIENT
Start: 2021-07-03 | End: 2021-07-04

## 2021-07-03 RX ORDER — POTASSIUM CHLORIDE 20 MEQ
20 PACKET (EA) ORAL ONCE
Refills: 0 | Status: COMPLETED | OUTPATIENT
Start: 2021-07-03 | End: 2021-07-03

## 2021-07-03 RX ORDER — TETRACAINE/BENZOCAINE/BUTAMBEN 2%-14%-2%
1 OINTMENT (GRAM) TOPICAL EVERY 4 HOURS
Refills: 0 | Status: DISCONTINUED | OUTPATIENT
Start: 2021-07-03 | End: 2021-07-04

## 2021-07-03 RX ORDER — SODIUM CHLORIDE 9 MG/ML
1000 INJECTION, SOLUTION INTRAVENOUS
Refills: 0 | Status: DISCONTINUED | OUTPATIENT
Start: 2021-07-03 | End: 2021-07-05

## 2021-07-03 RX ORDER — HYDROMORPHONE HYDROCHLORIDE 2 MG/ML
0.5 INJECTION INTRAMUSCULAR; INTRAVENOUS; SUBCUTANEOUS
Refills: 0 | Status: DISCONTINUED | OUTPATIENT
Start: 2021-07-03 | End: 2021-07-05

## 2021-07-03 RX ORDER — HYDROMORPHONE HYDROCHLORIDE 2 MG/ML
0.5 INJECTION INTRAMUSCULAR; INTRAVENOUS; SUBCUTANEOUS EVERY 4 HOURS
Refills: 0 | Status: DISCONTINUED | OUTPATIENT
Start: 2021-07-03 | End: 2021-07-05

## 2021-07-03 RX ORDER — CIPROFLOXACIN LACTATE 400MG/40ML
400 VIAL (ML) INTRAVENOUS ONCE
Refills: 0 | Status: COMPLETED | OUTPATIENT
Start: 2021-07-03 | End: 2021-07-03

## 2021-07-03 RX ORDER — MORPHINE SULFATE 50 MG/1
4 CAPSULE, EXTENDED RELEASE ORAL ONCE
Refills: 0 | Status: DISCONTINUED | OUTPATIENT
Start: 2021-07-03 | End: 2021-07-03

## 2021-07-03 RX ORDER — ALBUTEROL 90 UG/1
2.5 AEROSOL, METERED ORAL EVERY 6 HOURS
Refills: 0 | Status: DISCONTINUED | OUTPATIENT
Start: 2021-07-03 | End: 2021-07-10

## 2021-07-03 RX ORDER — SODIUM CHLORIDE 9 MG/ML
1000 INJECTION, SOLUTION INTRAVENOUS ONCE
Refills: 0 | Status: COMPLETED | OUTPATIENT
Start: 2021-07-03 | End: 2021-07-03

## 2021-07-03 RX ORDER — ACETAMINOPHEN 500 MG
1000 TABLET ORAL ONCE
Refills: 0 | Status: COMPLETED | OUTPATIENT
Start: 2021-07-03 | End: 2021-07-03

## 2021-07-03 RX ORDER — CEFTRIAXONE 500 MG/1
1000 INJECTION, POWDER, FOR SOLUTION INTRAMUSCULAR; INTRAVENOUS ONCE
Refills: 0 | Status: COMPLETED | OUTPATIENT
Start: 2021-07-03 | End: 2021-07-03

## 2021-07-03 RX ORDER — CIPROFLOXACIN LACTATE 400MG/40ML
400 VIAL (ML) INTRAVENOUS DAILY
Refills: 0 | Status: DISCONTINUED | OUTPATIENT
Start: 2021-07-04 | End: 2021-07-05

## 2021-07-03 RX ADMIN — FENTANYL CITRATE 50 MICROGRAM(S): 50 INJECTION INTRAVENOUS at 03:01

## 2021-07-03 RX ADMIN — MORPHINE SULFATE 4 MILLIGRAM(S): 50 CAPSULE, EXTENDED RELEASE ORAL at 01:47

## 2021-07-03 RX ADMIN — Medication 1 SPRAY(S): at 08:10

## 2021-07-03 RX ADMIN — FENTANYL CITRATE 50 MICROGRAM(S): 50 INJECTION INTRAVENOUS at 05:41

## 2021-07-03 RX ADMIN — Medication 1000 MILLIGRAM(S): at 15:15

## 2021-07-03 RX ADMIN — HYDROMORPHONE HYDROCHLORIDE 0.5 MILLIGRAM(S): 2 INJECTION INTRAMUSCULAR; INTRAVENOUS; SUBCUTANEOUS at 16:52

## 2021-07-03 RX ADMIN — Medication 400 MILLIGRAM(S): at 00:41

## 2021-07-03 RX ADMIN — FENTANYL CITRATE 50 MICROGRAM(S): 50 INJECTION INTRAVENOUS at 04:47

## 2021-07-03 RX ADMIN — HYDROMORPHONE HYDROCHLORIDE 0.5 MILLIGRAM(S): 2 INJECTION INTRAMUSCULAR; INTRAVENOUS; SUBCUTANEOUS at 17:00

## 2021-07-03 RX ADMIN — MORPHINE SULFATE 4 MILLIGRAM(S): 50 CAPSULE, EXTENDED RELEASE ORAL at 02:17

## 2021-07-03 RX ADMIN — Medication 85 MILLIMOLE(S): at 23:11

## 2021-07-03 RX ADMIN — Medication 37.5 MICROGRAM(S): at 21:17

## 2021-07-03 RX ADMIN — ENOXAPARIN SODIUM 40 MILLIGRAM(S): 100 INJECTION SUBCUTANEOUS at 21:21

## 2021-07-03 RX ADMIN — Medication 400 MILLIGRAM(S): at 15:00

## 2021-07-03 RX ADMIN — Medication 50 GRAM(S): at 22:11

## 2021-07-03 RX ADMIN — HYDROMORPHONE HYDROCHLORIDE 0.5 MILLIGRAM(S): 2 INJECTION INTRAMUSCULAR; INTRAVENOUS; SUBCUTANEOUS at 20:15

## 2021-07-03 RX ADMIN — Medication 5 MILLIGRAM(S): at 21:21

## 2021-07-03 RX ADMIN — Medication 500 MILLIGRAM(S): at 06:00

## 2021-07-03 RX ADMIN — Medication 10 MILLIGRAM(S): at 07:40

## 2021-07-03 RX ADMIN — SODIUM CHLORIDE 1000 MILLILITER(S): 9 INJECTION, SOLUTION INTRAVENOUS at 04:17

## 2021-07-03 RX ADMIN — Medication 50 MILLIEQUIVALENT(S): at 16:54

## 2021-07-03 RX ADMIN — Medication 200 MILLIGRAM(S): at 13:20

## 2021-07-03 RX ADMIN — FENTANYL CITRATE 50 MICROGRAM(S): 50 INJECTION INTRAVENOUS at 02:31

## 2021-07-03 RX ADMIN — HYDROMORPHONE HYDROCHLORIDE 0.5 MILLIGRAM(S): 2 INJECTION INTRAMUSCULAR; INTRAVENOUS; SUBCUTANEOUS at 23:30

## 2021-07-03 RX ADMIN — HYDROMORPHONE HYDROCHLORIDE 0.5 MILLIGRAM(S): 2 INJECTION INTRAMUSCULAR; INTRAVENOUS; SUBCUTANEOUS at 23:17

## 2021-07-03 RX ADMIN — Medication 100 MILLIGRAM(S): at 13:20

## 2021-07-03 RX ADMIN — Medication 5 MILLIGRAM(S): at 18:44

## 2021-07-03 RX ADMIN — SODIUM CHLORIDE 125 MILLILITER(S): 9 INJECTION, SOLUTION INTRAVENOUS at 10:36

## 2021-07-03 RX ADMIN — Medication 100 MILLIGRAM(S): at 21:16

## 2021-07-03 RX ADMIN — FENTANYL CITRATE 50 MICROGRAM(S): 50 INJECTION INTRAVENOUS at 04:17

## 2021-07-03 RX ADMIN — PANTOPRAZOLE SODIUM 40 MILLIGRAM(S): 20 TABLET, DELAYED RELEASE ORAL at 09:20

## 2021-07-03 RX ADMIN — CEFTRIAXONE 100 MILLIGRAM(S): 500 INJECTION, POWDER, FOR SOLUTION INTRAMUSCULAR; INTRAVENOUS at 05:11

## 2021-07-03 RX ADMIN — HYDROMORPHONE HYDROCHLORIDE 1 MILLIGRAM(S): 2 INJECTION INTRAMUSCULAR; INTRAVENOUS; SUBCUTANEOUS at 07:23

## 2021-07-03 RX ADMIN — SODIUM CHLORIDE 6000 MILLILITER(S): 9 INJECTION, SOLUTION INTRAVENOUS at 09:19

## 2021-07-03 RX ADMIN — Medication 100 MILLIGRAM(S): at 06:51

## 2021-07-03 RX ADMIN — Medication 1000 MILLIGRAM(S): at 01:11

## 2021-07-03 RX ADMIN — ALBUTEROL 2.5 MILLIGRAM(S): 90 AEROSOL, METERED ORAL at 20:23

## 2021-07-03 RX ADMIN — SODIUM CHLORIDE 125 MILLILITER(S): 9 INJECTION, SOLUTION INTRAVENOUS at 21:17

## 2021-07-03 RX ADMIN — FENTANYL CITRATE 50 MICROGRAM(S): 50 INJECTION INTRAVENOUS at 05:11

## 2021-07-03 RX ADMIN — Medication 1 PATCH: at 09:17

## 2021-07-03 RX ADMIN — HYDROMORPHONE HYDROCHLORIDE 0.5 MILLIGRAM(S): 2 INJECTION INTRAMUSCULAR; INTRAVENOUS; SUBCUTANEOUS at 20:01

## 2021-07-03 RX ADMIN — HYDROMORPHONE HYDROCHLORIDE 1 MILLIGRAM(S): 2 INJECTION INTRAMUSCULAR; INTRAVENOUS; SUBCUTANEOUS at 08:10

## 2021-07-03 NOTE — CONSULT NOTE ADULT - ATTENDING COMMENTS
seen and examined 07-03-21 @ 0800    ? @ [facility] - percutaneous cholecystostomy tube for acute cholecystitis  4/10/2021 @ [facility] - replacement of dislodged percutaneous cholecystostomy tube  7/2/2021 - laparoscopic cholecystectomy    afeb  217/101  138  34  96% on room air    mild respiratory distress  soft / mild diffuse tenderness with guarding / moderately distended / tympanitic in epigastrium / obese  trocar incisions clean without evidence of wound infection  no drainage at prior perc suzi tube site  no pedal edema  no jaundice    WBC = 16  hgb = 12.0 g/dL  HCO3 = 11  anion gap = 26  Cr = 0.9  lactate = 9.8 -> 7.7    CTA chest - no pulmonary embolism, lung infiltrates or pleural effusion  CT abd/pelvis w/ contrast - large gastric bubble. no significant intraperitoneal air or fluid    EKG (7/2 1806) - no evidence of MI      POD#1 s/p laparoscopic cholecystectomy for prior acute cholecystitis s/p percutaneous cholecystostomy tube  severe sepsis of unclear etiology  -abdominal pain and dyspnea improved a bit after NG tube placement  -discussed case with Dr Benson and plan is for diagnostic laparoscopy  -admit to SICU  -ABx  -judicious fluid resuscitation given history of CHF  -attempt to obtain initial perc suzi cultures from [place]    HTN  -clonidine patch to avoid severe hypotension (on clonidine 0.2 mg daily at home)  -hold metoprolol (on 200 mg extended release daily)    CHF  -hold furosemide for now (on 40 mg daily)

## 2021-07-03 NOTE — ED ADULT NURSE REASSESSMENT NOTE - NS ED NURSE REASSESS COMMENT FT1
PT  continues to complain of pain.  Dr. Londono made aware. ordered pt medication, administered as per order.  PT on Cardiac monitor. Will continue to monitor.

## 2021-07-03 NOTE — CONSULT NOTE ADULT - SUBJECTIVE AND OBJECTIVE BOX
SURGERY CONSULT    HPI:  70 y/o F w/ multiple medical problems POD 0 s/p uncomplicated laparoscopic cholecystectomy by Dr. Dougherty. Patient has suspected bronchospasm prior to discharge which improved after nebulized albuterol. Patient returned to the ED with complaints of SOB and Chest pain.  Denies N/V/CP.       PAST MEDICAL HISTORY:    Asthma  Hypertension  Hyperlipidemia  Stroke  Polio  Afib  At risk for sleep apnea  Morbidly obese  H/O gastroesophageal reflux (GERD)  History of dyspnea  Heart failure  Wheelchair bound    PAST SURGICAL HISTORY:  S/P cholecystectomy    ALLERGIES:  penicillin (Angioedema; Rash; Urticaria)      FAMILY HISTORY: Noncontributory    SOCIAL HISTORY: Denies tobacco, EtOH, illicit substance use.     HOME MEDICATIONS:    MEDICATIONS  (STANDING):  metroNIDAZOLE  IVPB 500 milliGRAM(s) IV Intermittent once    MEDICATIONS  (PRN):      VITALS & I/Os:  Vital Signs Last 24 Hrs  T(C): 36.6 (2021 00:34), Max: 37 (2021 18:00)  T(F): 97.9 (2021 00:34), Max: 98.6 (2021 18:00)  HR: 140 (2021 00:34) (78 - 140)  BP: 179/87 (2021 00:34) (103/50 - 179/87)  BP(mean): --  RR: 20 (2021 00:34) (12 - 22)  SpO2: 97% (2021 00:34) (96% - 100%)  CAPILLARY BLOOD GLUCOSE      I&O's Summary      GENERAL: Alert, well developed, in no mild distress.  MENTAL STATUS: AAOx3. Appropriate affect.  HEENT: PERRLA. EOMI. MMM.  Trachea midline. No lymph node swelling or tenderness.  RESPIRATORY: Tachypneic CTAB. No wheezing, rales or rhonchi.  CARDIOVASCULAR: RRR. No audible murmurs, rubs or gallops.   GASTROINTESTINAL: Abdomen soft, mild tenderness at laparoscopic port sites, Distended , -R/-G.  No pulsatile mass, no flank tenderness or suprapubic tenderness. No hepatosplenomegaly.  NEUROLOGIC: Cranial nerves II-XII grossly intact. No focal neurological deficits. Moves all extremities spontaneously. Sensation intact bilaterally.  INTEGUMENTARY: No overt rashes or lesions, petechia or purpura. Good turgor. No edema.  MUSCULOSKELETAL: No cyanosis or clubbing. No gross deformities.   LYMPHATIC: Palpation of neck reveals no swelling or tenderness of neck nodes. Palpation of groin reveals no swelling or tenderness of groin nodes.    LABS:                        12.0   15.71 )-----------( 580      ( 2021 01:59 )             37.8     07-    134<L>  |  100  |  9.7  ----------------------------<  169<H>  3.8   |  14.0<L>  |  0.85    Ca    9.2      2021 05:25    TPro  7.9  /  Alb  3.9  /  TBili  0.4  /  DBili  x   /  AST  43<H>  /  ALT  53<H>  /  AlkPhos  107  -03    Lactate: metroNIDAZOLE  IVPB 500 milliGRAM(s) IV Intermittent once       @ 05:25  7.60   @ 01:59  9.80    Urinalysis Basic - ( 2021 05:25 )    Color: Yellow / Appearance: Clear / S.020 / pH: x  Gluc: x / Ketone: Moderate  / Bili: Negative / Urobili: Negative mg/dL   Blood: x / Protein: 100 mg/dL / Nitrite: Negative   Leuk Esterase: Trace / RBC: 25-50 /HPF / WBC 3-5   Sq Epi: x / Non Sq Epi: Occasional / Bacteria: Few        IMAGING:     EXAM:  CT ABDOMEN AND PELVIS IC                         [facility]                          PROCEDURE DATE:  2021          INTERPRETATION:  CLINICAL INFORMATION: Abdominal pain acute nonlocalized. Pain. Question pulmonary embolism    COMPARISON: CT chest abdomen pelvis 2021.    CONTRAST/COMPLICATIONS:  IV Contrast: Omnipaque 350 (accession [id number]), IV contrast documented in associated exam (accession [id number])  90 cc administered   10 cc discarded  Oral Contrast: NONE  Complications: None reported at time of study completion    PROCEDURE:  CT Angiography of the Chest was performed followed by portal venous phase imaging of the Abdomen and Pelvis.  Sagittal and coronal reformats were performed as well as 3D (MIP) reconstructions.    FINDINGS:  CHEST:  LUNGS AND LARGE AIRWAYS: Patent central airways. Mild dependent atelectatic changes  PLEURA: No pleural effusion.  VESSELS: No pulmonary emboli.  HEART: Heart size is normal. No pericardial effusion.  MEDIASTINUM AND KATYA: No lymphadenopathy.  CHEST WALL AND LOWER NECK: Within normal limits.    ABDOMEN AND PELVIS:  LIVER: Small hepatic hypodensities unchanged  BILE DUCTS: Normal caliber.  GALLBLADDER: A previously seen cholecystectomy tube is no longer present. The former tube track is seen within the subcutaneous tissues. There is a small intraperitoneal fluid collection measuring approximately 1.5 x 1.5 x 4.0 cm and several tiny droplets of air adjacent to the gallbladder fundus.  SPLEEN: Within normal limits.  PANCREAS: Within normal limits.  ADRENALS: Within normal limits.  KIDNEYS/URETERS: Within normal limits.    BLADDER: Within normal limits.  REPRODUCTIVE ORGANS: Uterus and adnexa within normal limits.    BOWEL: No bowel obstruction. Appendix is normal.  PERITONEUM: No ascites.  VESSELS: Within normal limits.  RETROPERITONEUM/LYMPH NODES: No lymphadenopathy.  ABDOMINAL WALL: Within normal limits.  BONES: Degenerative changes.    IMPRESSION:  No pulmonary emboli.    A previously seen cholecystectomy tube is no longer present. The former tube track is seen within the subcutaneous tissues. There is a small intraperitoneal fluid collection measuring approximately 1.5 x 1.5 x 4.0 cm and several tiny droplets of air adjacent to the gallbladder fundus.

## 2021-07-03 NOTE — ED ADULT NURSE REASSESSMENT NOTE - NS ED NURSE REASSESS COMMENT FT1
Late Note approx 1010   Pt reports feeling sob with CP, reports no change in abdominal pain. SICU PA was made aware and advised to place pt reverse trendelenberg and flush NGT. NGT flushed with 10ml water and BRB noted in tube, quickly turned back to bilious now light brown and SICU PA made aware. EKG being completed now. Pt reports relief with new positioning. Vitals as charted. RN at bedside to closely monitor. Await SICU call back.

## 2021-07-03 NOTE — BRIEF OPERATIVE NOTE - OPERATION/FINDINGS
Abdomen insufflated using Veress needle and 5mm trocar inserted periumbilical using Optiview technique, no injury from entry noted  Two additional 5mm trocar placed under direct visualization in LUQ, RUQ   No sulcus or signs of peritonitis noted   Gallbladder fossa with clot noted, suctioned, surgical clips visualized, in place  Stomach and duodenum inspected, no injuries noted, methylene blue given through NGT, no extravasation noted  Small bowel and colon inspected, no signs of injury or ischemia

## 2021-07-03 NOTE — CONSULT NOTE ADULT - SUBJECTIVE AND OBJECTIVE BOX
HPI:  72 y/o F w/ multiple medical problems POD 0 s/p uncomplicated laparoscopic cholecystectomy by Dr. Dougherty. Patient has suspected bronchospasm prior to discharge which improved after nebulized albuterol. Patient returned to the ED with complaints of SOB and Chest pain.  Denies N/V/CP.     PAST MEDICAL HISTORY:    Asthma  Hypertension  Hyperlipidemia  Stroke  Polio  Afib  At risk for sleep apnea  Morbidly obese  H/O gastroesophageal reflux (GERD)  History of dyspnea  Heart failure  Wheelchair bound    PAST SURGICAL HISTORY:  S/P cholecystectomy    ALLERGIES:  penicillin (Angioedema; Rash; Urticaria)    FAMILY HISTORY: Noncontributory    SOCIAL HISTORY: Denies tobacco, EtOH, illicit substance use.     HOME MEDICATIONS:    MEDICATIONS  (STANDING):  metroNIDAZOLE  IVPB 500 milliGRAM(s) IV Intermittent once    MEDICATIONS  (PRN):    VITALS & I/Os:  Vital Signs Last 24 Hrs  T(C): 36.6 (2021 00:34), Max: 37 (2021 18:00)  T(F): 97.9 (2021 00:34), Max: 98.6 (2021 18:00)  HR: 140 (2021 00:34) (78 - 140)  BP: 179/87 (2021 00:34) (103/50 - 179/87)  BP(mean): --  RR: 20 (2021 00:34) (12 - 22)  SpO2: 97% (2021 00:34) (96% - 100%)  CAPILLARY BLOOD GLUCOSE      I&O's Summary    GENERAL: Alert, well developed, in no mild distress.  MENTAL STATUS: AAOx3. Appropriate affect.  HEENT: PERRLA. EOMI. MMM.  Trachea midline. No lymph node swelling or tenderness.  RESPIRATORY: Tachypneic CTAB. No wheezing, rales or rhonchi.  CARDIOVASCULAR: RRR. No audible murmurs, rubs or gallops.   GASTROINTESTINAL: Abdomen soft, mild tenderness at laparoscopic port sites, Distended , -R/-G.  No pulsatile mass, no flank tenderness or suprapubic tenderness. No hepatosplenomegaly.  NEUROLOGIC: Cranial nerves II-XII grossly intact. No focal neurological deficits. Moves all extremities spontaneously. Sensation intact bilaterally.  INTEGUMENTARY: No overt rashes or lesions, petechia or purpura. Good turgor. No edema.  MUSCULOSKELETAL: No cyanosis or clubbing. No gross deformities.   LYMPHATIC: Palpation of neck reveals no swelling or tenderness of neck nodes. Palpation of groin reveals no swelling or tenderness of groin nodes.    LABS:                        12.0   15.71 )-----------( 580      ( 2021 01:59 )             37.8     07-    134<L>  |  100  |  9.7  ----------------------------<  169<H>  3.8   |  14.0<L>  |  0.85    Ca    9.2      2021 05:25    TPro  7.9  /  Alb  3.9  /  TBili  0.4  /  DBili  x   /  AST  43<H>  /  ALT  53<H>  /  AlkPhos  107  07-03    Lactate: metroNIDAZOLE  IVPB 500 milliGRAM(s) IV Intermittent once       @ 05:25  7.60   @ 01:59  9.80    Urinalysis Basic - ( 2021 05:25 )    Color: Yellow / Appearance: Clear / S.020 / pH: x  Gluc: x / Ketone: Moderate  / Bili: Negative / Urobili: Negative mg/dL   Blood: x / Protein: 100 mg/dL / Nitrite: Negative   Leuk Esterase: Trace / RBC: 25-50 /HPF / WBC 3-5   Sq Epi: x / Non Sq Epi: Occasional / Bacteria: Few        IMAGING:     EXAM:  CT ABDOMEN AND PELVIS IC                         [facility]                          PROCEDURE DATE:  2021          INTERPRETATION:  CLINICAL INFORMATION: Abdominal pain acute nonlocalized. Pain. Question pulmonary embolism    COMPARISON: CT chest abdomen pelvis 2021.    CONTRAST/COMPLICATIONS:  IV Contrast: Omnipaque 350 (accession [id number]), IV contrast documented in associated exam (accession [id number])  90 cc administered   10 cc discarded  Oral Contrast: NONE  Complications: None reported at time of study completion    PROCEDURE:  CT Angiography of the Chest was performed followed by portal venous phase imaging of the Abdomen and Pelvis.  Sagittal and coronal reformats were performed as well as 3D (MIP) reconstructions.    FINDINGS:  CHEST:  LUNGS AND LARGE AIRWAYS: Patent central airways. Mild dependent atelectatic changes  PLEURA: No pleural effusion.  VESSELS: No pulmonary emboli.  HEART: Heart size is normal. No pericardial effusion.  MEDIASTINUM AND KATYA: No lymphadenopathy.  CHEST WALL AND LOWER NECK: Within normal limits.    ABDOMEN AND PELVIS:  LIVER: Small hepatic hypodensities unchanged  BILE DUCTS: Normal caliber.  GALLBLADDER: A previously seen cholecystectomy tube is no longer present. The former tube track is seen within the subcutaneous tissues. There is a small intraperitoneal fluid collection measuring approximately 1.5 x 1.5 x 4.0 cm and several tiny droplets of air adjacent to the gallbladder fundus.  SPLEEN: Within normal limits.  PANCREAS: Within normal limits.  ADRENALS: Within normal limits.  KIDNEYS/URETERS: Within normal limits.    BLADDER: Within normal limits.  REPRODUCTIVE ORGANS: Uterus and adnexa within normal limits.    BOWEL: No bowel obstruction. Appendix is normal.  PERITONEUM: No ascites.  VESSELS: Within normal limits.  RETROPERITONEUM/LYMPH NODES: No lymphadenopathy.  ABDOMINAL WALL: Within normal limits.  BONES: Degenerative changes.    IMPRESSION:  No pulmonary emboli.    A previously seen cholecystectomy tube is no longer present. The former tube track is seen within the subcutaneous tissues. There is a small intraperitoneal fluid collection measuring approximately 1.5 x 1.5 x 4.0 cm and several tiny droplets of air adjacent to the gallbladder fundus.    Assessment and Recommendation:   · Assessment	  72 y/o F w/ multiple medical problems POD #1 s/p uncomplicated laparoscopic cholecystectomy representing to ED w/ complaints of SOB, Abd pain.    - Admit to SICU  - Plan for diagnostic laparoscopy 7/3/21 with Dr. Benson    Plan discussed with Dr. Marcelino Lorenzo MD PGY3  Department of Surgery (2021 09:43)      ICU Vital Signs Last 24 Hrs  T(C): 37 (2021 07:30), Max: 37 (2021 18:00)  T(F): 98.6 (2021 07:30), Max: 98.6 (2021 18:00)  HR: 114 (2021 11:02) (87 - 140)  BP: 189/91 (2021 11:02) (121/53 - 217/101)  BP(mean): --  ABP: --  ABP(mean): --  RR: 20 (2021 11:02) (13 - 34)  SpO2: 95% (2021 11:02) (95% - 100%)      I&O's Detail    2021 07:01  -  2021 13:05  --------------------------------------------------------  IN:    multiple electrolytes Injection Type 1 Bolus: 1000 mL    multiple electrolytes Injection Type 1.: 125 mL  Total IN: 1125 mL    OUT:    Indwelling Catheter - Urethral (mL): 275 mL    Nasogastric/Oral tube (mL): 250 mL  Total OUT: 525 mL    Total NET: 600 mL                MEDICATIONS  (STANDING):  ciprofloxacin   IVPB 400 milliGRAM(s) IV Intermittent once  cloNIDine Patch 0.2 mG/24Hr(s) 1 patch Transdermal every 7 days  metroNIDAZOLE  IVPB 500 milliGRAM(s) IV Intermittent once  multiple electrolytes Injection Type 1 1000 milliLiter(s) (125 mL/Hr) IV Continuous <Continuous>  pantoprazole  Injectable 40 milliGRAM(s) IV Push daily    MEDICATIONS  (PRN):  tetracaine/benzocaine/butamben Spray 1 Spray(s) Topical every 4 hours PRN NGT irritation      NUTRITION/IVF:     CENTRAL LINE:  LOCATION:   DATE INSERTED:  CVP:  SCVO2:    WOO:   DATE INSERTED:    A-LINE:    LOCATION:   DATE INSERTED:   SVV:  CO/CI:     CHEST TUBE:  LOCATION:  DATE INSERTED: OUTPUT/24 HRS:  SUCTION/WATER SEAL:     NG/OG TUBE:  DATE INSERTED:  OUTPUT/24 HRS:    MISC:     PHYSICAL EXAM:    Gen:    Eyes:    Neurological:    ENMT:    Neck:    Pulmonary:    Cardiovascular:    Gastrointestinal:    Genitourinary:    Back:    Extremities:    Skin:    Musculoskeletal:          LABS:  CBC Full  -  ( 2021 11:26 )  WBC Count : 16.17 K/uL  RBC Count : 3.94 M/uL  Hemoglobin : 10.9 g/dL  Hematocrit : 33.6 %  Platelet Count - Automated : 560 K/uL  Mean Cell Volume : 85.3 fl  Mean Cell Hemoglobin : 27.7 pg  Mean Cell Hemoglobin Concentration : 32.4 gm/dL  Auto Neutrophil # : 13.25 K/uL  Auto Lymphocyte # : 1.28 K/uL  Auto Monocyte # : 1.34 K/uL  Auto Eosinophil # : 0.00 K/uL  Auto Basophil # : 0.03 K/uL  Auto Neutrophil % : 81.9 %  Auto Lymphocyte % : 7.9 %  Auto Monocyte % : 8.3 %  Auto Eosinophil % : 0.0 %  Auto Basophil % : 0.2 %    07-    134<L>  |  100  |  9.7  ----------------------------<  169<H>  3.8   |  14.0<L>  |  0.85    Ca    9.2      2021 05:25    TPro  7.9  /  Alb  3.9  /  TBili  0.4  /  DBili  x   /  AST  43<H>  /  ALT  53<H>  /  AlkPhos  107  07-      Urinalysis Basic - ( 2021 05:25 )    Color: Yellow / Appearance: Clear / S.020 / pH: x  Gluc: x / Ketone: Moderate  / Bili: Negative / Urobili: Negative mg/dL   Blood: x / Protein: 100 mg/dL / Nitrite: Negative   Leuk Esterase: Trace / RBC: 25-50 /HPF / WBC 3-5   Sq Epi: x / Non Sq Epi: Occasional / Bacteria: Few      RECENT CULTURES:      LIVER FUNCTIONS - ( 2021 01:59 )  Alb: 3.9 g/dL / Pro: 7.9 g/dL / ALK PHOS: 107 U/L / ALT: 53 U/L / AST: 43 U/L / GGT: x           CARDIAC MARKERS ( 2021 11:26 )  x     / 0.02 ng/mL / x     / x     / x          CAPILLARY BLOOD GLUCOSE      RADIOLOGY & ADDITIONAL STUDIES:    ASSESSMENT/PLAN:  71yFemale presenting with:    Neuro:    CV:    Pulm:    GI/Nutrition:    /Renal:    ID:    Lines/Tubes:    Endo:    Skin:    Proph:    Dispo:      CRITICAL CARE TIME SPENT: HPI:  72 y/o F w/ multiple medical problems POD 1 s/p uncomplicated laparoscopic cholecystectomy by Dr. Dougherty. Patient has suspected bronchospasm prior to discharge which improved after nebulized albuterol. Patient returned to the ED with complaints of SOB, Chest pain, and abd pain.  Denies N/V/fevers.  Pt noted to have decreased HCO3 and elevated LA on arrival to ED.  SICU consulted for tachycardia/tachypnea.        PAST MEDICAL HISTORY:    Asthma  Hypertension  Hyperlipidemia  Stroke  Polio  Afib  At risk for sleep apnea  Morbidly obese  H/O gastroesophageal reflux (GERD)  History of dyspnea  Heart failure  Wheelchair bound    PAST SURGICAL HISTORY:  S/P cholecystectomy    ALLERGIES:  penicillin (Angioedema; Rash; Urticaria)    FAMILY HISTORY: Noncontributory    SOCIAL HISTORY: Denies tobacco, EtOH, illicit substance use.     HOME MEDICATIONS:        LABS:                        12.0   15.71 )-----------( 580      ( 2021 01:59 )             37.8     07-    134<L>  |  100  |  9.7  ----------------------------<  169<H>  3.8   |  14.0<L>  |  0.85    Ca    9.2      2021 05:25    TPro  7.9  /  Alb  3.9  /  TBili  0.4  /  DBili  x   /  AST  43<H>  /  ALT  53<H>  /  AlkPhos  107  07-03    Lactate: metroNIDAZOLE  IVPB 500 milliGRAM(s) IV Intermittent once      07-03 @ 05:25  7.60  07- @ 01:59  9.80    Urinalysis Basic - ( 2021 05:25 )    Color: Yellow / Appearance: Clear / S.020 / pH: x  Gluc: x / Ketone: Moderate  / Bili: Negative / Urobili: Negative mg/dL   Blood: x / Protein: 100 mg/dL / Nitrite: Negative   Leuk Esterase: Trace / RBC: 25-50 /HPF / WBC 3-5   Sq Epi: x / Non Sq Epi: Occasional / Bacteria: Few        IMAGING:     EXAM:  CT ABDOMEN AND PELVIS IC                         EXAM:  [facility]GIO CHEST [facility]                          PROCEDURE DATE:  2021          INTERPRETATION:  CLINICAL INFORMATION: Abdominal pain acute nonlocalized. Pain. Question pulmonary embolism    COMPARISON: CT chest abdomen pelvis 2021.    CONTRAST/COMPLICATIONS:  IV Contrast: Omnipaque 350 (accession [id number]), IV contrast documented in associated exam (accession [id number])  90 cc administered   10 cc discarded  Oral Contrast: NONE  Complications: None reported at time of study completion    PROCEDURE:  CT Angiography of the Chest was performed followed by portal venous phase imaging of the Abdomen and Pelvis.  Sagittal and coronal reformats were performed as well as 3D (MIP) reconstructions.    FINDINGS:  CHEST:  LUNGS AND LARGE AIRWAYS: Patent central airways. Mild dependent atelectatic changes  PLEURA: No pleural effusion.  VESSELS: No pulmonary emboli.  HEART: Heart size is normal. No pericardial effusion.  MEDIASTINUM AND KATYA: No lymphadenopathy.  CHEST WALL AND LOWER NECK: Within normal limits.    ABDOMEN AND PELVIS:  LIVER: Small hepatic hypodensities unchanged  BILE DUCTS: Normal caliber.  GALLBLADDER: A previously seen cholecystectomy tube is no longer present. The former tube track is seen within the subcutaneous tissues. There is a small intraperitoneal fluid collection measuring approximately 1.5 x 1.5 x 4.0 cm and several tiny droplets of air adjacent to the gallbladder fundus.  SPLEEN: Within normal limits.  PANCREAS: Within normal limits.  ADRENALS: Within normal limits.  KIDNEYS/URETERS: Within normal limits.    BLADDER: Within normal limits.  REPRODUCTIVE ORGANS: Uterus and adnexa within normal limits.    BOWEL: No bowel obstruction. Appendix is normal.  PERITONEUM: No ascites.  VESSELS: Within normal limits.  RETROPERITONEUM/LYMPH NODES: No lymphadenopathy.  ABDOMINAL WALL: Within normal limits.  BONES: Degenerative changes.    IMPRESSION:  No pulmonary emboli.    A previously seen cholecystectomy tube is no longer present. The former tube track is seen within the subcutaneous tissues. There is a small intraperitoneal fluid collection measuring approximately 1.5 x 1.5 x 4.0 cm and several tiny droplets of air adjacent to the gallbladder fundus.        ICU Vital Signs Last 24 Hrs  T(C): 37 (2021 07:30), Max: 37 (2021 18:00)  T(F): 98.6 (2021 07:30), Max: 98.6 (2021 18:00)  HR: 114 (2021 11:02) (87 - 140)  BP: 189/91 (2021 11:02) (121/53 - 217/101)  BP(mean): --  ABP: --  ABP(mean): --  RR: 20 (2021 11:02) (13 - 34)  SpO2: 95% (2021 11:02) (95% - 100%)      I&O's Detail    2021 07:01  -  2021 13:05  --------------------------------------------------------  IN:    multiple electrolytes Injection Type 1 Bolus: 1000 mL    multiple electrolytes Injection Type 1.: 125 mL  Total IN: 1125 mL    OUT:    Indwelling Catheter - Urethral (mL): 275 mL    Nasogastric/Oral tube (mL): 250 mL  Total OUT: 525 mL    Total NET: 600 mL                MEDICATIONS  (STANDING):  ciprofloxacin   IVPB 400 milliGRAM(s) IV Intermittent once  cloNIDine Patch 0.2 mG/24Hr(s) 1 patch Transdermal every 7 days  metroNIDAZOLE  IVPB 500 milliGRAM(s) IV Intermittent once  multiple electrolytes Injection Type 1 1000 milliLiter(s) (125 mL/Hr) IV Continuous <Continuous>  pantoprazole  Injectable 40 milliGRAM(s) IV Push daily    MEDICATIONS  (PRN):  tetracaine/benzocaine/butamben Spray 1 Spray(s) Topical every 4 hours PRN NGT irritation      NUTRITION/IVF: NPO/IVL    CENTRAL LINE: No    WOO:  No    A-LINE:  No      NG/OG TUBE:  Yes, bilious output approx 200ccs at time of my exam        PHYSICAL EXAM:    Gen:  NAD sitting upright in bed    Eyes:  EOMI's BL    Neurological:  GCS 15    ENMT:  NGT in okace    Neck:  Supple    Pulmonary:  No acute pulmonary distress.  No conversational dyspnea, no accessory muscle use    Cardiovascular:  Sinus tach, hyptertensive    Gastrointestinal:  Softly distended, ttp RUQ, no rebound or guarding noted on my exam    Genitourinary:  Voiding    Extremities:  AFROM x4    Skin:  W/D/I    Musculoskeletal:  No pedal edema          LABS:  CBC Full  -  ( 2021 11:26 )  WBC Count : 16.17 K/uL  RBC Count : 3.94 M/uL  Hemoglobin : 10.9 g/dL  Hematocrit : 33.6 %  Platelet Count - Automated : 560 K/uL  Mean Cell Volume : 85.3 fl  Mean Cell Hemoglobin : 27.7 pg  Mean Cell Hemoglobin Concentration : 32.4 gm/dL  Auto Neutrophil # : 13.25 K/uL  Auto Lymphocyte # : 1.28 K/uL  Auto Monocyte # : 1.34 K/uL  Auto Eosinophil # : 0.00 K/uL  Auto Basophil # : 0.03 K/uL  Auto Neutrophil % : 81.9 %  Auto Lymphocyte % : 7.9 %  Auto Monocyte % : 8.3 %  Auto Eosinophil % : 0.0 %  Auto Basophil % : 0.2 %    07-    134<L>  |  100  |  9.7  ----------------------------<  169<H>  3.8   |  14.0<L>  |  0.85    Ca    9.2      2021 05:25    TPro  7.9  /  Alb  3.9  /  TBili  0.4  /  DBili  x   /  AST  43<H>  /  ALT  53<H>  /  AlkPhos  107  07-03      Urinalysis Basic - ( 2021 05:25 )    Color: Yellow / Appearance: Clear / S.020 / pH: x  Gluc: x / Ketone: Moderate  / Bili: Negative / Urobili: Negative mg/dL   Blood: x / Protein: 100 mg/dL / Nitrite: Negative   Leuk Esterase: Trace / RBC: 25-50 /HPF / WBC 3-5   Sq Epi: x / Non Sq Epi: Occasional / Bacteria: Few      RECENT CULTURES:      LIVER FUNCTIONS - ( 2021 01:59 )  Alb: 3.9 g/dL / Pro: 7.9 g/dL / ALK PHOS: 107 U/L / ALT: 53 U/L / AST: 43 U/L / GGT: x           CARDIAC MARKERS ( 2021 11:26 )  x     / 0.02 ng/mL / x     / x     / x          CAPILLARY BLOOD GLUCOSE      RADIOLOGY & ADDITIONAL STUDIES:    ASSESSMENT/PLAN:  71yFemale presenting with:  abd distention, metabolic acidosis POD 1 uncomplicated Lap [place]ka          Admit pt to SICU for resuscitation  Ileus 2/2 to unclear cause  Metabolic acidosis- pt underresuscitated, 1L bolus given prior to my seeing the pt, additional 1L IVF ordered by me with IVF ordered  Plan for diagnostic laparoscopy by Dr Benson    Post op SICU care      Dr Antoine aware

## 2021-07-03 NOTE — H&P ADULT - HISTORY OF PRESENT ILLNESS
HPI:  72 y/o F w/ multiple medical problems POD 0 s/p uncomplicated laparoscopic cholecystectomy by Dr. Dougherty. Patient has suspected bronchospasm prior to discharge which improved after nebulized albuterol. Patient returned to the ED with complaints of SOB and Chest pain.  Denies N/V/CP.     PAST MEDICAL HISTORY:    Asthma  Hypertension  Hyperlipidemia  Stroke  Polio  Afib  At risk for sleep apnea  Morbidly obese  H/O gastroesophageal reflux (GERD)  History of dyspnea  Heart failure  Wheelchair bound    PAST SURGICAL HISTORY:  S/P cholecystectomy    ALLERGIES:  penicillin (Angioedema; Rash; Urticaria)    FAMILY HISTORY: Noncontributory    SOCIAL HISTORY: Denies tobacco, EtOH, illicit substance use.     HOME MEDICATIONS:    MEDICATIONS  (STANDING):  metroNIDAZOLE  IVPB 500 milliGRAM(s) IV Intermittent once    MEDICATIONS  (PRN):    VITALS & I/Os:  Vital Signs Last 24 Hrs  T(C): 36.6 (2021 00:34), Max: 37 (2021 18:00)  T(F): 97.9 (2021 00:34), Max: 98.6 (2021 18:00)  HR: 140 (2021 00:34) (78 - 140)  BP: 179/87 (2021 00:34) (103/50 - 179/87)  BP(mean): --  RR: 20 (2021 00:34) (12 - 22)  SpO2: 97% (2021 00:34) (96% - 100%)  CAPILLARY BLOOD GLUCOSE      I&O's Summary    GENERAL: Alert, well developed, in no mild distress.  MENTAL STATUS: AAOx3. Appropriate affect.  HEENT: PERRLA. EOMI. MMM.  Trachea midline. No lymph node swelling or tenderness.  RESPIRATORY: Tachypneic CTAB. No wheezing, rales or rhonchi.  CARDIOVASCULAR: RRR. No audible murmurs, rubs or gallops.   GASTROINTESTINAL: Abdomen soft, mild tenderness at laparoscopic port sites, Distended , -R/-G.  No pulsatile mass, no flank tenderness or suprapubic tenderness. No hepatosplenomegaly.  NEUROLOGIC: Cranial nerves II-XII grossly intact. No focal neurological deficits. Moves all extremities spontaneously. Sensation intact bilaterally.  INTEGUMENTARY: No overt rashes or lesions, petechia or purpura. Good turgor. No edema.  MUSCULOSKELETAL: No cyanosis or clubbing. No gross deformities.   LYMPHATIC: Palpation of neck reveals no swelling or tenderness of neck nodes. Palpation of groin reveals no swelling or tenderness of groin nodes.    LABS:                        12.0   15.71 )-----------( 580      ( 2021 01:59 )             37.8     07-    134<L>  |  100  |  9.7  ----------------------------<  169<H>  3.8   |  14.0<L>  |  0.85    Ca    9.2      2021 05:25    TPro  7.9  /  Alb  3.9  /  TBili  0.4  /  DBili  x   /  AST  43<H>  /  ALT  53<H>  /  AlkPhos  107  07-03    Lactate: metroNIDAZOLE  IVPB 500 milliGRAM(s) IV Intermittent once       @ 05:25  7.60   @ 01:59  9.80    Urinalysis Basic - ( 2021 05:25 )    Color: Yellow / Appearance: Clear / S.020 / pH: x  Gluc: x / Ketone: Moderate  / Bili: Negative / Urobili: Negative mg/dL   Blood: x / Protein: 100 mg/dL / Nitrite: Negative   Leuk Esterase: Trace / RBC: 25-50 /HPF / WBC 3-5   Sq Epi: x / Non Sq Epi: Occasional / Bacteria: Few        IMAGING:     EXAM:  CT ABDOMEN AND PELVIS IC                         [facility]                          PROCEDURE DATE:  2021          INTERPRETATION:  CLINICAL INFORMATION: Abdominal pain acute nonlocalized. Pain. Question pulmonary embolism    COMPARISON: CT chest abdomen pelvis 2021.    CONTRAST/COMPLICATIONS:  IV Contrast: Omnipaque 350 (accession [id number]), IV contrast documented in associated exam (accession [id number])  90 cc administered   10 cc discarded  Oral Contrast: NONE  Complications: None reported at time of study completion    PROCEDURE:  CT Angiography of the Chest was performed followed by portal venous phase imaging of the Abdomen and Pelvis.  Sagittal and coronal reformats were performed as well as 3D (MIP) reconstructions.    FINDINGS:  CHEST:  LUNGS AND LARGE AIRWAYS: Patent central airways. Mild dependent atelectatic changes  PLEURA: No pleural effusion.  VESSELS: No pulmonary emboli.  HEART: Heart size is normal. No pericardial effusion.  MEDIASTINUM AND KATYA: No lymphadenopathy.  CHEST WALL AND LOWER NECK: Within normal limits.    ABDOMEN AND PELVIS:  LIVER: Small hepatic hypodensities unchanged  BILE DUCTS: Normal caliber.  GALLBLADDER: A previously seen cholecystectomy tube is no longer present. The former tube track is seen within the subcutaneous tissues. There is a small intraperitoneal fluid collection measuring approximately 1.5 x 1.5 x 4.0 cm and several tiny droplets of air adjacent to the gallbladder fundus.  SPLEEN: Within normal limits.  PANCREAS: Within normal limits.  ADRENALS: Within normal limits.  KIDNEYS/URETERS: Within normal limits.    BLADDER: Within normal limits.  REPRODUCTIVE ORGANS: Uterus and adnexa within normal limits.    BOWEL: No bowel obstruction. Appendix is normal.  PERITONEUM: No ascites.  VESSELS: Within normal limits.  RETROPERITONEUM/LYMPH NODES: No lymphadenopathy.  ABDOMINAL WALL: Within normal limits.  BONES: Degenerative changes.    IMPRESSION:  No pulmonary emboli.    A previously seen cholecystectomy tube is no longer present. The former tube track is seen within the subcutaneous tissues. There is a small intraperitoneal fluid collection measuring approximately 1.5 x 1.5 x 4.0 cm and several tiny droplets of air adjacent to the gallbladder fundus.    Assessment and Recommendation:   · Assessment	  72 y/o F w/ multiple medical problems POD #1 s/p uncomplicated laparoscopic cholecystectomy representing to ED w/ complaints of SOB, Abd pain.    - Admit to SICU  - Plan for diagnostic laparoscopy 7/3/21 with Dr. Benson    Plan discussed with Dr. Marcelino Lorenzo MD PGY3  Department of Surgery

## 2021-07-03 NOTE — CHART NOTE - NSCHARTNOTEFT_GEN_A_CORE
Post-op check    Patient seen and examined, POD#1 s/p robotic cholecystectomy, now POD#0 s/p negative diagnostic lap  Tolerated procedure well  Remains NPO with NGT, blue output from methylene blue, minimal  c/o vague, generalized abdominal pain, improved with medications  Terrell in place, adequate urine output  Lactated downtrending, acidosis improving after IVF resuscitation    T(C): 37.1 (07-03-21 @ 19:08), Max: 37.1 (07-03-21 @ 19:08)  HR: 108 (07-03-21 @ 20:23) (92 - 140)  BP: 189/91 (07-03-21 @ 11:02) (149/96 - 217/101)  RR: 27 (07-03-21 @ 19:00) (18 - 34)  SpO2: 95% (07-03-21 @ 20:23) (95% - 100%)    07-03-21 @ 07:01  -  07-03-21 @ 21:21  --------------------------------------------------------  IN: 1850 mL / OUT: 1085 mL / NET: 765 mL        ALBUTerol    0.083% 2.5 milliGRAM(s) Nebulizer every 6 hours  cloNIDine Patch 0.2 mG/24Hr(s) 1 patch Transdermal every 7 days  enoxaparin Injectable 40 milliGRAM(s) SubCutaneous daily  HYDROmorphone  Injectable 0.5 milliGRAM(s) IV Push every 4 hours PRN  HYDROmorphone  Injectable 0.5 milliGRAM(s) IV Push every 3 hours PRN  levothyroxine Injectable 37.5 MICROGram(s) IV Push at bedtime  metoprolol tartrate Injectable 5 milliGRAM(s) IV Push every 4 hours  metroNIDAZOLE  IVPB 500 milliGRAM(s) IV Intermittent every 8 hours  multiple electrolytes Injection Type 1 1000 milliLiter(s) (125 mL/Hr) IV Continuous <Continuous>  pantoprazole  Injectable 40 milliGRAM(s) IV Push daily  tetracaine/benzocaine/butamben Spray 1 Spray(s) Topical every 4 hours PRN      GEN:  AAOx3, distress due to abdominal pain/SOB  HEAD: NC/AT  EYES: PERRL, EOMI.  CARDIAC: RRR, S1/S2. No S3, S4   LUNGS: CTAB  ABDOMEN: S, ND, NT  MUSKULOSKELETAL: FROMx4 extremities, no back pain  NEUROLOGICAL: Grossly intact, no deficits  SKIN: Skin normal color, no rash    A/P yo F/M  POD#0 s/p Post-op check    Patient seen and examined, POD#1 s/p robotic cholecystectomy, now POD#0 s/p negative diagnostic lap  Tolerated procedure well  Remains NPO with NGT, blue output from methylene blue, minimal  c/o vague, generalized abdominal pain, improved with medications  Terrell in place, adequate urine output  Lactated downtrending, acidosis improving after IVF resuscitation    T(C): 37.1 (07-03-21 @ 19:08), Max: 37.1 (07-03-21 @ 19:08)  HR: 108 (07-03-21 @ 20:23) (92 - 140)  BP: 189/91 (07-03-21 @ 11:02) (149/96 - 217/101)  RR: 27 (07-03-21 @ 19:00) (18 - 34)  SpO2: 95% (07-03-21 @ 20:23) (95% - 100%)    07-03-21 @ 07:01  -  07-03-21 @ 21:21  --------------------------------------------------------  IN: 1850 mL / OUT: 1085 mL / NET: 765 mL        ALBUTerol    0.083% 2.5 milliGRAM(s) Nebulizer every 6 hours  cloNIDine Patch 0.2 mG/24Hr(s) 1 patch Transdermal every 7 days  enoxaparin Injectable 40 milliGRAM(s) SubCutaneous daily  HYDROmorphone  Injectable 0.5 milliGRAM(s) IV Push every 4 hours PRN  HYDROmorphone  Injectable 0.5 milliGRAM(s) IV Push every 3 hours PRN  levothyroxine Injectable 37.5 MICROGram(s) IV Push at bedtime  metoprolol tartrate Injectable 5 milliGRAM(s) IV Push every 4 hours  metroNIDAZOLE  IVPB 500 milliGRAM(s) IV Intermittent every 8 hours  multiple electrolytes Injection Type 1 1000 milliLiter(s) (125 mL/Hr) IV Continuous <Continuous>  pantoprazole  Injectable 40 milliGRAM(s) IV Push daily  tetracaine/benzocaine/butamben Spray 1 Spray(s) Topical every 4 hours PRN      GEN:  AAOx3, distress due to abdominal pain/SOB  HEAD: NC/AT  EYES: PERRL, EOMI.  CARDIAC: RRR, S1/S2. No S3, S4   LUNGS: CTAB  ABDOMEN: S, distended  MUSKULOSKELETAL: FROMx4 extremities, no back pain  NEUROLOGICAL: Grossly intact, no deficits  SKIN: Skin normal color, no rash    A/P 70 yo F  POD#0 s/p diagnostic lap, negative  Improving clinically, still c/o abdominal pain improving  -Continue NPO/IVF resucitation  -Continue broad spectrum antibiotics  -SICU care

## 2021-07-04 LAB
ANION GAP SERPL CALC-SCNC: 12 MMOL/L — SIGNIFICANT CHANGE UP (ref 5–17)
BASOPHILS # BLD AUTO: 0.03 K/UL — SIGNIFICANT CHANGE UP (ref 0–0.2)
BASOPHILS NFR BLD AUTO: 0.2 % — SIGNIFICANT CHANGE UP (ref 0–2)
BUN SERPL-MCNC: 8.1 MG/DL — SIGNIFICANT CHANGE UP (ref 8–20)
CALCIUM SERPL-MCNC: 7.9 MG/DL — LOW (ref 8.6–10.2)
CHLORIDE SERPL-SCNC: 101 MMOL/L — SIGNIFICANT CHANGE UP (ref 98–107)
CO2 SERPL-SCNC: 23 MMOL/L — SIGNIFICANT CHANGE UP (ref 22–29)
COVID-19 SPIKE DOMAIN AB INTERP: POSITIVE
COVID-19 SPIKE DOMAIN ANTIBODY RESULT: 37.4 U/ML — HIGH
CREAT SERPL-MCNC: 0.67 MG/DL — SIGNIFICANT CHANGE UP (ref 0.5–1.3)
EOSINOPHIL # BLD AUTO: 0 K/UL — SIGNIFICANT CHANGE UP (ref 0–0.5)
EOSINOPHIL NFR BLD AUTO: 0 % — SIGNIFICANT CHANGE UP (ref 0–6)
GLUCOSE SERPL-MCNC: 135 MG/DL — HIGH (ref 70–99)
HCT VFR BLD CALC: 28.4 % — LOW (ref 34.5–45)
HGB BLD-MCNC: 9 G/DL — LOW (ref 11.5–15.5)
IMM GRANULOCYTES NFR BLD AUTO: 1.4 % — SIGNIFICANT CHANGE UP (ref 0–1.5)
LYMPHOCYTES # BLD AUTO: 1.9 K/UL — SIGNIFICANT CHANGE UP (ref 1–3.3)
LYMPHOCYTES # BLD AUTO: 11.9 % — LOW (ref 13–44)
MAGNESIUM SERPL-MCNC: 2.5 MG/DL — SIGNIFICANT CHANGE UP (ref 1.6–2.6)
MCHC RBC-ENTMCNC: 27.9 PG — SIGNIFICANT CHANGE UP (ref 27–34)
MCHC RBC-ENTMCNC: 31.7 GM/DL — LOW (ref 32–36)
MCV RBC AUTO: 87.9 FL — SIGNIFICANT CHANGE UP (ref 80–100)
MONOCYTES # BLD AUTO: 1.61 K/UL — HIGH (ref 0–0.9)
MONOCYTES NFR BLD AUTO: 10.1 % — SIGNIFICANT CHANGE UP (ref 2–14)
NEUTROPHILS # BLD AUTO: 12.17 K/UL — HIGH (ref 1.8–7.4)
NEUTROPHILS NFR BLD AUTO: 76.4 % — SIGNIFICANT CHANGE UP (ref 43–77)
PHOSPHATE SERPL-MCNC: 2.8 MG/DL — SIGNIFICANT CHANGE UP (ref 2.4–4.7)
PLATELET # BLD AUTO: 439 K/UL — HIGH (ref 150–400)
POTASSIUM SERPL-MCNC: 3.3 MMOL/L — LOW (ref 3.5–5.3)
POTASSIUM SERPL-SCNC: 3.3 MMOL/L — LOW (ref 3.5–5.3)
RBC # BLD: 3.23 M/UL — LOW (ref 3.8–5.2)
RBC # FLD: 15.7 % — HIGH (ref 10.3–14.5)
SARS-COV-2 IGG+IGM SERPL QL IA: 37.4 U/ML — HIGH
SARS-COV-2 IGG+IGM SERPL QL IA: POSITIVE
SODIUM SERPL-SCNC: 136 MMOL/L — SIGNIFICANT CHANGE UP (ref 135–145)
WBC # BLD: 15.94 K/UL — HIGH (ref 3.8–10.5)
WBC # FLD AUTO: 15.94 K/UL — HIGH (ref 3.8–10.5)

## 2021-07-04 PROCEDURE — 99232 SBSQ HOSP IP/OBS MODERATE 35: CPT

## 2021-07-04 PROCEDURE — 71045 X-RAY EXAM CHEST 1 VIEW: CPT | Mod: 26

## 2021-07-04 RX ORDER — GABAPENTIN 400 MG/1
100 CAPSULE ORAL THREE TIMES A DAY
Refills: 0 | Status: DISCONTINUED | OUTPATIENT
Start: 2021-07-04 | End: 2021-07-04

## 2021-07-04 RX ORDER — POTASSIUM PHOSPHATE, MONOBASIC POTASSIUM PHOSPHATE, DIBASIC 236; 224 MG/ML; MG/ML
15 INJECTION, SOLUTION INTRAVENOUS ONCE
Refills: 0 | Status: COMPLETED | OUTPATIENT
Start: 2021-07-04 | End: 2021-07-04

## 2021-07-04 RX ORDER — SENNA PLUS 8.6 MG/1
10 TABLET ORAL AT BEDTIME
Refills: 0 | Status: DISCONTINUED | OUTPATIENT
Start: 2021-07-04 | End: 2021-07-05

## 2021-07-04 RX ORDER — AMITRIPTYLINE HCL 25 MG
50 TABLET ORAL AT BEDTIME
Refills: 0 | Status: DISCONTINUED | OUTPATIENT
Start: 2021-07-04 | End: 2021-07-05

## 2021-07-04 RX ORDER — AMLODIPINE BESYLATE 2.5 MG/1
2.5 TABLET ORAL DAILY
Refills: 0 | Status: DISCONTINUED | OUTPATIENT
Start: 2021-07-04 | End: 2021-07-05

## 2021-07-04 RX ORDER — LACTULOSE 10 G/15ML
15 SOLUTION ORAL ONCE
Refills: 0 | Status: COMPLETED | OUTPATIENT
Start: 2021-07-04 | End: 2021-07-04

## 2021-07-04 RX ORDER — PANTOPRAZOLE SODIUM 20 MG/1
40 TABLET, DELAYED RELEASE ORAL
Refills: 0 | Status: DISCONTINUED | OUTPATIENT
Start: 2021-07-04 | End: 2021-07-04

## 2021-07-04 RX ORDER — METOPROLOL TARTRATE 50 MG
100 TABLET ORAL
Refills: 0 | Status: DISCONTINUED | OUTPATIENT
Start: 2021-07-04 | End: 2021-07-05

## 2021-07-04 RX ORDER — GABAPENTIN 400 MG/1
100 CAPSULE ORAL THREE TIMES A DAY
Refills: 0 | Status: DISCONTINUED | OUTPATIENT
Start: 2021-07-04 | End: 2021-07-05

## 2021-07-04 RX ORDER — GABAPENTIN 400 MG/1
100 CAPSULE ORAL
Refills: 0 | Status: DISCONTINUED | OUTPATIENT
Start: 2021-07-04 | End: 2021-07-04

## 2021-07-04 RX ORDER — LEVOTHYROXINE SODIUM 125 MCG
75 TABLET ORAL DAILY
Refills: 0 | Status: DISCONTINUED | OUTPATIENT
Start: 2021-07-04 | End: 2021-07-05

## 2021-07-04 RX ORDER — PANTOPRAZOLE SODIUM 20 MG/1
40 TABLET, DELAYED RELEASE ORAL
Refills: 0 | Status: DISCONTINUED | OUTPATIENT
Start: 2021-07-04 | End: 2021-07-05

## 2021-07-04 RX ORDER — ONDANSETRON 8 MG/1
4 TABLET, FILM COATED ORAL ONCE
Refills: 0 | Status: COMPLETED | OUTPATIENT
Start: 2021-07-04 | End: 2021-07-04

## 2021-07-04 RX ORDER — POTASSIUM CHLORIDE 20 MEQ
10 PACKET (EA) ORAL
Refills: 0 | Status: COMPLETED | OUTPATIENT
Start: 2021-07-04 | End: 2021-07-04

## 2021-07-04 RX ADMIN — LACTULOSE 15 GRAM(S): 10 SOLUTION ORAL at 17:04

## 2021-07-04 RX ADMIN — HYDROMORPHONE HYDROCHLORIDE 0.5 MILLIGRAM(S): 2 INJECTION INTRAMUSCULAR; INTRAVENOUS; SUBCUTANEOUS at 08:50

## 2021-07-04 RX ADMIN — HYDROMORPHONE HYDROCHLORIDE 0.5 MILLIGRAM(S): 2 INJECTION INTRAMUSCULAR; INTRAVENOUS; SUBCUTANEOUS at 04:22

## 2021-07-04 RX ADMIN — Medication 100 MILLIGRAM(S): at 13:04

## 2021-07-04 RX ADMIN — HYDROMORPHONE HYDROCHLORIDE 0.5 MILLIGRAM(S): 2 INJECTION INTRAMUSCULAR; INTRAVENOUS; SUBCUTANEOUS at 08:44

## 2021-07-04 RX ADMIN — Medication 100 MILLIGRAM(S): at 21:18

## 2021-07-04 RX ADMIN — Medication 5 MILLIGRAM(S): at 04:27

## 2021-07-04 RX ADMIN — Medication 10 MILLIGRAM(S): at 17:04

## 2021-07-04 RX ADMIN — Medication 5 MILLIGRAM(S): at 10:31

## 2021-07-04 RX ADMIN — SODIUM CHLORIDE 125 MILLILITER(S): 9 INJECTION, SOLUTION INTRAVENOUS at 04:24

## 2021-07-04 RX ADMIN — POTASSIUM PHOSPHATE, MONOBASIC POTASSIUM PHOSPHATE, DIBASIC 62.5 MILLIMOLE(S): 236; 224 INJECTION, SOLUTION INTRAVENOUS at 06:20

## 2021-07-04 RX ADMIN — ALBUTEROL 2.5 MILLIGRAM(S): 90 AEROSOL, METERED ORAL at 08:22

## 2021-07-04 RX ADMIN — GABAPENTIN 100 MILLIGRAM(S): 400 CAPSULE ORAL at 21:17

## 2021-07-04 RX ADMIN — Medication 200 MILLIGRAM(S): at 11:07

## 2021-07-04 RX ADMIN — SENNA PLUS 10 MILLILITER(S): 8.6 TABLET ORAL at 22:38

## 2021-07-04 RX ADMIN — Medication 5 MILLIGRAM(S): at 13:05

## 2021-07-04 RX ADMIN — HYDROMORPHONE HYDROCHLORIDE 0.5 MILLIGRAM(S): 2 INJECTION INTRAMUSCULAR; INTRAVENOUS; SUBCUTANEOUS at 21:17

## 2021-07-04 RX ADMIN — HYDROMORPHONE HYDROCHLORIDE 0.5 MILLIGRAM(S): 2 INJECTION INTRAMUSCULAR; INTRAVENOUS; SUBCUTANEOUS at 21:30

## 2021-07-04 RX ADMIN — HYDROMORPHONE HYDROCHLORIDE 0.5 MILLIGRAM(S): 2 INJECTION INTRAMUSCULAR; INTRAVENOUS; SUBCUTANEOUS at 04:35

## 2021-07-04 RX ADMIN — Medication 50 MILLIGRAM(S): at 21:18

## 2021-07-04 RX ADMIN — Medication 1 PATCH: at 11:13

## 2021-07-04 RX ADMIN — Medication 5 MILLIGRAM(S): at 01:03

## 2021-07-04 RX ADMIN — ALBUTEROL 2.5 MILLIGRAM(S): 90 AEROSOL, METERED ORAL at 01:13

## 2021-07-04 RX ADMIN — Medication 100 MILLIGRAM(S): at 04:23

## 2021-07-04 RX ADMIN — Medication 100 MILLIGRAM(S): at 21:17

## 2021-07-04 RX ADMIN — Medication 5 MILLIGRAM(S): at 17:05

## 2021-07-04 RX ADMIN — Medication 100 MILLIEQUIVALENT(S): at 08:01

## 2021-07-04 RX ADMIN — HYDROMORPHONE HYDROCHLORIDE 0.5 MILLIGRAM(S): 2 INJECTION INTRAMUSCULAR; INTRAVENOUS; SUBCUTANEOUS at 13:04

## 2021-07-04 RX ADMIN — ALBUTEROL 2.5 MILLIGRAM(S): 90 AEROSOL, METERED ORAL at 20:13

## 2021-07-04 RX ADMIN — ALBUTEROL 2.5 MILLIGRAM(S): 90 AEROSOL, METERED ORAL at 15:07

## 2021-07-04 RX ADMIN — HYDROMORPHONE HYDROCHLORIDE 0.5 MILLIGRAM(S): 2 INJECTION INTRAMUSCULAR; INTRAVENOUS; SUBCUTANEOUS at 13:10

## 2021-07-04 RX ADMIN — Medication 1 PATCH: at 19:39

## 2021-07-04 RX ADMIN — ONDANSETRON 4 MILLIGRAM(S): 8 TABLET, FILM COATED ORAL at 12:47

## 2021-07-04 RX ADMIN — AMLODIPINE BESYLATE 2.5 MILLIGRAM(S): 2.5 TABLET ORAL at 21:18

## 2021-07-04 RX ADMIN — PANTOPRAZOLE SODIUM 40 MILLIGRAM(S): 20 TABLET, DELAYED RELEASE ORAL at 11:07

## 2021-07-04 RX ADMIN — Medication 100 MILLIEQUIVALENT(S): at 06:21

## 2021-07-04 RX ADMIN — ENOXAPARIN SODIUM 40 MILLIGRAM(S): 100 INJECTION SUBCUTANEOUS at 11:07

## 2021-07-04 RX ADMIN — Medication 100 MILLIEQUIVALENT(S): at 07:23

## 2021-07-04 NOTE — PROGRESS NOTE ADULT - SUBJECTIVE AND OBJECTIVE BOX
INTERVAL HPI/OVERNIGHT EVENTS/SUBJECTIVE: Went to OR for diagnostic Lap with no injury or obvious abnormalities noted.  Appropriate resuscitation in OR and ICU lead to improvement of labs, metabolic acidosis and Lactic acidosis.  Pt had continued pain last night but I increased frequency of Dilaudid and pt feels much improved today.  Denies NV, Hunger, Ab pain, fever, chills or other CO.    ICU Vital Signs Last 24 Hrs  T(C): 37.1 (2021 00:00), Max: 37.3 (2021 22:00)  T(F): 98.8 (2021 00:00), Max: 99.1 (2021 22:00)  HR: 95 (2021 01:13) (92 - 138)  BP: 140/73 (2021 01:00) (140/73 - 217/101)  BP(mean): 90 (2021 01:00) (90 - 92)  ABP: 163/77 (:00) (102/88 - 187/83)  ABP(mean): 107 (2021 01:00) (-11 - 122)  RR: 15 (2021 01:00) (15 - 34)  SpO2: 93% (2021 01:13) (91% - 100%)      I&O's Detail    2021 07:01  -  2021 01:37  --------------------------------------------------------  IN:    IV PiggyBack: 100 mL    IV PiggyBack: 50 mL    IV PiggyBack: 100 mL    IV PiggyBack: 249.9 mL    multiple electrolytes Injection Type 1 Bolus: 1000 mL    multiple electrolytes Injection Type 1.: 1500 mL  Total IN: 2999.9 mL    OUT:    Indwelling Catheter - Urethral (mL): 1110 mL    Nasogastric/Oral tube (mL): 250 mL  Total OUT: 1360 mL    Total NET: 1639.9 mL            ABG - ( 2021 15:08 )  pH, Arterial: 7.400 pH, Blood: x     /  pCO2: 32    /  pO2: 70    / HCO3: 20    / Base Excess: -5.0  /  SaO2: 97.0                MEDICATIONS  (STANDING):  ALBUTerol    0.083% 2.5 milliGRAM(s) Nebulizer every 6 hours  ciprofloxacin   IVPB 400 milliGRAM(s) IV Intermittent daily  cloNIDine Patch 0.2 mG/24Hr(s) 1 patch Transdermal every 7 days  enoxaparin Injectable 40 milliGRAM(s) SubCutaneous daily  levothyroxine Injectable 37.5 MICROGram(s) IV Push at bedtime  metoprolol tartrate Injectable 5 milliGRAM(s) IV Push every 4 hours  metroNIDAZOLE  IVPB 500 milliGRAM(s) IV Intermittent every 8 hours  multiple electrolytes Injection Type 1 1000 milliLiter(s) (125 mL/Hr) IV Continuous <Continuous>  pantoprazole  Injectable 40 milliGRAM(s) IV Push daily    MEDICATIONS  (PRN):  HYDROmorphone  Injectable 0.5 milliGRAM(s) IV Push every 4 hours PRN Severe Pain (7 - 10)  HYDROmorphone  Injectable 0.5 milliGRAM(s) IV Push every 3 hours PRN break through pain  tetracaine/benzocaine/butamben Spray 1 Spray(s) Topical every 4 hours PRN NGT irritation      NUTRITION/IVF:     CENTRAL LINE:  LOCATION:   DATE INSERTED:  CVP:  SCVO2:    WOO:   DATE INSERTED:    A-LINE:    LOCATION:   DATE INSERTED:   SVV:  CO/CI:     CHEST TUBE:  LOCATION:  DATE INSERTED: OUTPUT/24 HRS:  SUCTION/WATER SEAL:     NG/OG TUBE:  DATE INSERTED:  OUTPUT/24 HRS:    MISC:     PHYSICAL EXAM:     Gen:NAD, Well appearing, No cyanosis, Pallor.    Eyes: PERRL ~ 3mm, EOMI,     Neurological: A&Ox3, GCS 15, No focal defficit.     ENMT: Clear canals, clear throat.      Neck: Supple. NT AT, FROM no pain.  No JVD. No meningeal signs    Pulmonary: NAD, CTA, = BL .      Cardiovascular: RRR, S1, S2, No Murmurs, rubs or gallops noted.    Gastrointestinal:ND, Soft, NT.    Genitourinary:     Back:     Extremities: NT, AT, no edema, erythema or palpable cord noted.  FROM, = 2+ pulses throughout.    Skin:     Musculoskeletal:          LABS:  CBC Full  -  ( 2021 20:47 )  WBC Count : 19.65 K/uL  RBC Count : 3.33 M/uL  Hemoglobin : 9.4 g/dL  Hematocrit : 29.0 %  Platelet Count - Automated : 475 K/uL  Mean Cell Volume : 87.1 fl  Mean Cell Hemoglobin : 28.2 pg  Mean Cell Hemoglobin Concentration : 32.4 gm/dL  Auto Neutrophil # : 15.41 K/uL  Auto Lymphocyte # : 1.65 K/uL  Auto Monocyte # : 2.21 K/uL  Auto Eosinophil # : 0.00 K/uL  Auto Basophil # : 0.03 K/uL  Auto Neutrophil % : 78.4 %  Auto Lymphocyte % : 8.4 %  Auto Monocyte % : 11.2 %  Auto Eosinophil % : 0.0 %  Auto Basophil % : 0.2 %    07-    136  |  101  |  9.1  ----------------------------<  117<H>  3.9   |  22.0  |  0.63    Ca    8.4<L>      2021 20:47  Phos  1.6     -  Mg     1.9     -    TPro  7.9  /  Alb  3.9  /  TBili  0.4  /  DBili  x   /  AST  43<H>  /  ALT  53<H>  /  AlkPhos  107  07-03      Urinalysis Basic - ( 2021 05:25 )    Color: Yellow / Appearance: Clear / S.020 / pH: x  Gluc: x / Ketone: Moderate  / Bili: Negative / Urobili: Negative mg/dL   Blood: x / Protein: 100 mg/dL / Nitrite: Negative   Leuk Esterase: Trace / RBC: 25-50 /HPF / WBC 3-5   Sq Epi: x / Non Sq Epi: Occasional / Bacteria: Few      RECENT CULTURES:      LIVER FUNCTIONS - ( 2021 01:59 )  Alb: 3.9 g/dL / Pro: 7.9 g/dL / ALK PHOS: 107 U/L / ALT: 53 U/L / AST: 43 U/L / GGT: x           CARDIAC MARKERS ( 2021 11:26 )  x     / 0.02 ng/mL / x     / x     / x          CAPILLARY BLOOD GLUCOSE      RADIOLOGY & ADDITIONAL STUDIES:    ASSESSMENT/PLAN:  71yFemale presenting with:        Neurological:    ENMT:    Neck:    Pulmonary:    Cardiovascular:    Gastrointestinal:    Genitourinary:    Heme:    ID:    Skin:    Lines/ Tubes:    Dispo:            CRITICAL CARE TIME SPENT:   INTERVAL HPI/OVERNIGHT EVENTS/SUBJECTIVE: Went to OR for diagnostic Lap with no injury or obvious abnormalities noted.  Appropriate resuscitation in OR and ICU lead to improvement of labs, metabolic acidosis and Lactic acidosis.  Pt had continued pain last night but I increased frequency of Dilaudid and pt feels much improved today.  Denies NV, Hunger, Ab pain, fever, chills or other CO.    ICU Vital Signs Last 24 Hrs  T(C): 37.1 (2021 00:00), Max: 37.3 (2021 22:00)  T(F): 98.8 (2021 00:00), Max: 99.1 (2021 22:00)  HR: 95 (2021 01:13) (92 - 138)  BP: 140/73 (2021 01:00) (140/73 - 217/101)  BP(mean): 90 (2021 01:00) (90 - 92)  ABP: 163/77 (:00) (102/88 - 187/83)  ABP(mean): 107 (2021 01:00) (-11 - 122)  RR: 15 (2021 01:00) (15 - 34)  SpO2: 93% (2021 01:13) (91% - 100%)      I&O's Detail    2021 07:01  -  2021 01:37  --------------------------------------------------------  IN:    IV PiggyBack: 100 mL    IV PiggyBack: 50 mL    IV PiggyBack: 100 mL    IV PiggyBack: 249.9 mL    multiple electrolytes Injection Type 1 Bolus: 1000 mL    multiple electrolytes Injection Type 1.: 1500 mL  Total IN: 2999.9 mL    OUT:    Indwelling Catheter - Urethral (mL): 1110 mL    Nasogastric/Oral tube (mL): 250 mL  Total OUT: 1360 mL    Total NET: 1639.9 mL            ABG - ( 2021 15:08 )  pH, Arterial: 7.400 pH, Blood: x     /  pCO2: 32    /  pO2: 70    / HCO3: 20    / Base Excess: -5.0  /  SaO2: 97.0                MEDICATIONS  (STANDING):  ALBUTerol    0.083% 2.5 milliGRAM(s) Nebulizer every 6 hours  ciprofloxacin   IVPB 400 milliGRAM(s) IV Intermittent daily  cloNIDine Patch 0.2 mG/24Hr(s) 1 patch Transdermal every 7 days  enoxaparin Injectable 40 milliGRAM(s) SubCutaneous daily  levothyroxine Injectable 37.5 MICROGram(s) IV Push at bedtime  metoprolol tartrate Injectable 5 milliGRAM(s) IV Push every 4 hours  metroNIDAZOLE  IVPB 500 milliGRAM(s) IV Intermittent every 8 hours  multiple electrolytes Injection Type 1 1000 milliLiter(s) (125 mL/Hr) IV Continuous <Continuous>  pantoprazole  Injectable 40 milliGRAM(s) IV Push daily    MEDICATIONS  (PRN):  HYDROmorphone  Injectable 0.5 milliGRAM(s) IV Push every 4 hours PRN Severe Pain (7 - 10)  HYDROmorphone  Injectable 0.5 milliGRAM(s) IV Push every 3 hours PRN break through pain  tetracaine/benzocaine/butamben Spray 1 Spray(s) Topical every 4 hours PRN NGT irritation      NUTRITION/IVF: NPO/ P-lyte @ 125    PHYSICAL EXAM:     Gen: NAD, Well appearing, No cyanosis, Pallor.    Eyes: PERRL ~ 3mm, EOMI,     Neurological: A&Ox3, GCS 15, No focal defficit.     ENMT: Clear canals, clear throat.      Neck: Supple. NT AT, FROM no pain.  No JVD. No meningeal signs    Pulmonary: NAD, CTA, = BL .      Cardiovascular: RRR, S1, S2, No Murmurs, rubs or gallops noted.    Gastrointestinal: Moderately distended. Laparoscopic incisions covered with bandages.  Mild-moderate generalized tenderness.     Extremities: NT, AT, no edema, erythema or palpable cord noted.  FROM, = 2+ pulses throughout.      LABS:  CBC Full  -  ( 2021 20:47 )  WBC Count : 19.65 K/uL  RBC Count : 3.33 M/uL  Hemoglobin : 9.4 g/dL  Hematocrit : 29.0 %  Platelet Count - Automated : 475 K/uL  Mean Cell Volume : 87.1 fl  Mean Cell Hemoglobin : 28.2 pg  Mean Cell Hemoglobin Concentration : 32.4 gm/dL  Auto Neutrophil # : 15.41 K/uL  Auto Lymphocyte # : 1.65 K/uL  Auto Monocyte # : 2.21 K/uL  Auto Eosinophil # : 0.00 K/uL  Auto Basophil # : 0.03 K/uL  Auto Neutrophil % : 78.4 %  Auto Lymphocyte % : 8.4 %  Auto Monocyte % : 11.2 %  Auto Eosinophil % : 0.0 %  Auto Basophil % : 0.2 %    07-    136  |  101  |  9.1  ----------------------------<  117<H>  3.9   |  22.0  |  0.63    Ca    8.4<L>      2021 20:47  Phos  1.6     -  Mg     1.9     -    TPro  7.9  /  Alb  3.9  /  TBili  0.4  /  DBili  x   /  AST  43<H>  /  ALT  53<H>  /  AlkPhos  107  07-03      Urinalysis Basic - ( 2021 05:25 )    Color: Yellow / Appearance: Clear / S.020 / pH: x  Gluc: x / Ketone: Moderate  / Bili: Negative / Urobili: Negative mg/dL   Blood: x / Protein: 100 mg/dL / Nitrite: Negative   Leuk Esterase: Trace / RBC: 25-50 /HPF / WBC 3-5   Sq Epi: x / Non Sq Epi: Occasional / Bacteria: Few      RECENT CULTURES:      LIVER FUNCTIONS - ( 2021 01:59 )  Alb: 3.9 g/dL / Pro: 7.9 g/dL / ALK PHOS: 107 U/L / ALT: 53 U/L / AST: 43 U/L / GGT: x           CARDIAC MARKERS ( 2021 11:26 )  x     / 0.02 ng/mL / x     / x     / x          CAPILLARY BLOOD GLUCOSE      RADIOLOGY & ADDITIONAL STUDIES:    ASSESSMENT/PLAN:  71yFemale presenting with: Metabolic acidosis, Post op ileus. hypophosphatemia.     Neurological: Tylenol as able.  Dilaudid if needed for severe pain.    Pulmonary: IS, Pulm toliet    Cardiovascular: I have increased frequency of Lopressor.  Convert to Home PO Dose when able.  I have re-ordered Clonadine patch.  Order PO Home Amlodipine when able.    Gastrointestinal: CW NGT.  LEIGH SLP.      Genitourinary: SHANT Terrell    Heme: Lovenox / SCD    ID: Cipro / Flagyl ?    Lines/ Tubes: Can DC Nidhi AVILA today.      Dispo: Care as above. Stable for floor transfer.      CRITICAL CARE TIME SPENT: 0

## 2021-07-04 NOTE — PROGRESS NOTE ADULT - SUBJECTIVE AND OBJECTIVE BOX
INTERVAL HPI/OVERNIGHT EVENTS:  Patient seen and examined  Abdominal pain unchanged  Acidosis improved, lactate cleared  Hgb down to 9, likely hemodilution from IVF resucitation  Afebrile  On empiric antibiotics    MEDICATIONS  (STANDING):  ALBUTerol    0.083% 2.5 milliGRAM(s) Nebulizer every 6 hours  ciprofloxacin   IVPB 400 milliGRAM(s) IV Intermittent daily  cloNIDine Patch 0.2 mG/24Hr(s) 1 patch Transdermal every 7 days  enoxaparin Injectable 40 milliGRAM(s) SubCutaneous daily  levothyroxine Injectable 37.5 MICROGram(s) IV Push at bedtime  metoprolol tartrate Injectable 5 milliGRAM(s) IV Push every 4 hours  metroNIDAZOLE  IVPB 500 milliGRAM(s) IV Intermittent every 8 hours  multiple electrolytes Injection Type 1 1000 milliLiter(s) (125 mL/Hr) IV Continuous <Continuous>  pantoprazole  Injectable 40 milliGRAM(s) IV Push daily  potassium chloride  10 mEq/100 mL IVPB 10 milliEquivalent(s) IV Intermittent every 1 hour    MEDICATIONS  (PRN):  HYDROmorphone  Injectable 0.5 milliGRAM(s) IV Push every 4 hours PRN Severe Pain (7 - 10)  HYDROmorphone  Injectable 0.5 milliGRAM(s) IV Push every 3 hours PRN break through pain  tetracaine/benzocaine/butamben Spray 1 Spray(s) Topical every 4 hours PRN NGT irritation      Vital Signs Last 24 Hrs  T(C): 36.9 (2021 03:08), Max: 37.3 (2021 22:00)  T(F): 98.4 (2021 03:08), Max: 99.1 (2021 22:00)  HR: 98 (2021 06:00) (92 - 138)  BP: 151/87 (2021 06:00) (140/73 - 217/101)  BP(mean): 103 (2021 06:00) (90 - 109)  RR: 19 (2021 06:00) (14 - 34)  SpO2: 92% (2021 06:00) (91% - 100%)    Physical Exam:  Gen: NAD, Well appearing, No cyanosis, Pallor.  Eyes: PERRL ~ 3mm, EOMI,   Neurological: A&Ox3, GCS 15, No focal defficit.   ENMT: Clear canals, clear throat.    Neck: Supple. NT AT, FROM no pain.  No JVD. No meningeal signs  Pulmonary: NAD, CTA, = BL .    Cardiovascular: RRR, S1, S2, No Murmurs, rubs or gallops noted.  Gastrointestinal: soft, distended, mild generalized tenderness, surgical dressings c/d/i   Extremities: NT, AT, no edema, erythema or palpable cord noted.  FROM, = 2+ pulses throughout.      I&O's Detail    2021 07:01  -  2021 07:00  --------------------------------------------------------  IN:    IV PiggyBack: 62.5 mL    IV PiggyBack: 200 mL    IV PiggyBack: 50 mL    IV PiggyBack: 499.8 mL    IV PiggyBack: 200 mL    multiple electrolytes Injection Type 1 Bolus: 1000 mL    multiple electrolytes Injection Type 1.: 2250 mL  Total IN: 4262.3 mL    OUT:    Indwelling Catheter - Urethral (mL): 1600 mL    Nasogastric/Oral tube (mL): 450 mL  Total OUT: 2050 mL    Total NET: 2212.3 mL          LABS:                        9.0    15.94 )-----------( 439      ( 2021 05:03 )             28.4     07-04    136  |  101  |  8.1  ----------------------------<  135<H>  3.3<L>   |  23.0  |  0.67    Ca    7.9<L>      2021 05:03  Phos  2.8     07-04  Mg     2.5     07-04    TPro  7.9  /  Alb  3.9  /  TBili  0.4  /  DBili  x   /  AST  43<H>  /  ALT  53<H>  /  AlkPhos  107  07-03      Urinalysis Basic - ( 2021 05:25 )    Color: Yellow / Appearance: Clear / S.020 / pH: x  Gluc: x / Ketone: Moderate  / Bili: Negative / Urobili: Negative mg/dL   Blood: x / Protein: 100 mg/dL / Nitrite: Negative   Leuk Esterase: Trace / RBC: 25-50 /HPF / WBC 3-5   Sq Epi: x / Non Sq Epi: Occasional / Bacteria: Few

## 2021-07-04 NOTE — PROVIDER CONTACT NOTE (EICU) - SITUATION
Per discussion with bedside RN, pt ordered PO gabapentin, pt receiving medication via NGT, requesting adjustment.

## 2021-07-04 NOTE — PROGRESS NOTE ADULT - ASSESSMENT
INTERVAL HPI/OVERNIGHT EVENTS:  Patient seen and examined  Abdominal pain unchanged      MEDICATIONS  (STANDING):  ALBUTerol    0.083% 2.5 milliGRAM(s) Nebulizer every 6 hours  ciprofloxacin   IVPB 400 milliGRAM(s) IV Intermittent daily  cloNIDine Patch 0.2 mG/24Hr(s) 1 patch Transdermal every 7 days  enoxaparin Injectable 40 milliGRAM(s) SubCutaneous daily  levothyroxine Injectable 37.5 MICROGram(s) IV Push at bedtime  metoprolol tartrate Injectable 5 milliGRAM(s) IV Push every 4 hours  metroNIDAZOLE  IVPB 500 milliGRAM(s) IV Intermittent every 8 hours  multiple electrolytes Injection Type 1 1000 milliLiter(s) (125 mL/Hr) IV Continuous <Continuous>  pantoprazole  Injectable 40 milliGRAM(s) IV Push daily  potassium chloride  10 mEq/100 mL IVPB 10 milliEquivalent(s) IV Intermittent every 1 hour    MEDICATIONS  (PRN):  HYDROmorphone  Injectable 0.5 milliGRAM(s) IV Push every 4 hours PRN Severe Pain (7 - 10)  HYDROmorphone  Injectable 0.5 milliGRAM(s) IV Push every 3 hours PRN break through pain  tetracaine/benzocaine/butamben Spray 1 Spray(s) Topical every 4 hours PRN NGT irritation      Vital Signs Last 24 Hrs  T(C): 36.9 (2021 03:08), Max: 37.3 (2021 22:00)  T(F): 98.4 (2021 03:08), Max: 99.1 (2021 22:00)  HR: 98 (2021 06:00) (92 - 138)  BP: 151/87 (2021 06:00) (140/73 - 217/101)  BP(mean): 103 (2021 06:00) (90 - 109)  RR: 19 (2021 06:00) (14 - 34)  SpO2: 92% (2021 06:00) (91% - 100%)    Physical Exam:    Neurological:  No sensory/motor deficits    HEENT: PERRLA, EOMI, no drainage or redness    Neck: No bruits; no thyromegaly or nodules,  No JVD    Back: Normal spine flexure, No CVA tenderness, No deformity or limitation of movement    Respiratory: Breath Sounds equal & clear to auscultation, no accessory muscle use    Cardiovascular: Regular rate & rhythm, normal S1, S2; no murmurs, gallops or rubs    Gastrointestinal: Soft, non-tender, normal bowel sounds    Extremities: No peripheral edema, No cyanosis, clubbing     Vascular: Equal and normal pulses: 2+ peripheral pulses throughout    Musculoskeletal: No joint pain, swelling or deformity; no limitation of movement    Skin: No rashes      I&O's Detail    2021 07:01  -  2021 07:00  --------------------------------------------------------  IN:    IV PiggyBack: 62.5 mL    IV PiggyBack: 200 mL    IV PiggyBack: 50 mL    IV PiggyBack: 499.8 mL    IV PiggyBack: 200 mL    multiple electrolytes Injection Type 1 Bolus: 1000 mL    multiple electrolytes Injection Type 1.: 2250 mL  Total IN: 4262.3 mL    OUT:    Indwelling Catheter - Urethral (mL): 1600 mL    Nasogastric/Oral tube (mL): 450 mL  Total OUT: 2050 mL    Total NET: 2212.3 mL          LABS:                        9.0    15.94 )-----------( 439      ( 2021 05:03 )             28.4     07-04    136  |  101  |  8.1  ----------------------------<  135<H>  3.3<L>   |  23.0  |  0.67    Ca    7.9<L>      2021 05:03  Phos  2.8     07-04  Mg     2.5     07-04    TPro  7.9  /  Alb  3.9  /  TBili  0.4  /  DBili  x   /  AST  43<H>  /  ALT  53<H>  /  AlkPhos  107  07-03      Urinalysis Basic - ( 2021 05:25 )    Color: Yellow / Appearance: Clear / S.020 / pH: x  Gluc: x / Ketone: Moderate  / Bili: Negative / Urobili: Negative mg/dL   Blood: x / Protein: 100 mg/dL / Nitrite: Negative   Leuk Esterase: Trace / RBC: 25-50 /HPF / WBC 3-5   Sq Epi: x / Non Sq Epi: Occasional / Bacteria: Few        RADIOLOGY & ADDITIONAL STUDIES: This is a 72 yo F who presented to [facility] ED on POD#1 s/p robotic cholecystectomy with generalized abdominal pain, SOB. Hypertensive, tachycardic, lactic acidosis, now POD#0 s/p negative diagnostic lap  Improved after appropriate IVFs resuscitation  -Keep NPO/IVFs  -Pain control  -BP meds restarted, SBP controlled now  -Will need PT  -Fu AM labs, trend CBC  -Continue empiric antiobiotics, will follow up admission blood cx

## 2021-07-04 NOTE — PROGRESS NOTE ADULT - ATTENDING COMMENTS
I have seen and examined the patient during SICU rounds.  events noted  images reviewed    neuro: Awake, non focal,   CV: HD normal, perfusion adequate, normal LA  Pulm: SaO2 ok  GI: obese, soft,   : urine flow adequate, hypokalemia, met acidosic resolved.  ID: WBC trending down  Heme: H/H stable, dvtc chemoprophylaxis    A/P  Stable , metabolic derangement improved.  negative diagnostic laparoscopy  Start feeds,   Will doscuss with primary team end date of abx

## 2021-07-05 LAB
ANION GAP SERPL CALC-SCNC: 10 MMOL/L — SIGNIFICANT CHANGE UP (ref 5–17)
BASOPHILS # BLD AUTO: 0.02 K/UL — SIGNIFICANT CHANGE UP (ref 0–0.2)
BASOPHILS NFR BLD AUTO: 0.2 % — SIGNIFICANT CHANGE UP (ref 0–2)
BUN SERPL-MCNC: 5.3 MG/DL — LOW (ref 8–20)
CALCIUM SERPL-MCNC: 8 MG/DL — LOW (ref 8.6–10.2)
CHLORIDE SERPL-SCNC: 102 MMOL/L — SIGNIFICANT CHANGE UP (ref 98–107)
CO2 SERPL-SCNC: 25 MMOL/L — SIGNIFICANT CHANGE UP (ref 22–29)
CREAT SERPL-MCNC: 0.51 MG/DL — SIGNIFICANT CHANGE UP (ref 0.5–1.3)
EOSINOPHIL # BLD AUTO: 0.01 K/UL — SIGNIFICANT CHANGE UP (ref 0–0.5)
EOSINOPHIL NFR BLD AUTO: 0.1 % — SIGNIFICANT CHANGE UP (ref 0–6)
GLUCOSE SERPL-MCNC: 113 MG/DL — HIGH (ref 70–99)
HCT VFR BLD CALC: 27.8 % — LOW (ref 34.5–45)
HGB BLD-MCNC: 8.7 G/DL — LOW (ref 11.5–15.5)
IMM GRANULOCYTES NFR BLD AUTO: 1 % — SIGNIFICANT CHANGE UP (ref 0–1.5)
LYMPHOCYTES # BLD AUTO: 1.53 K/UL — SIGNIFICANT CHANGE UP (ref 1–3.3)
LYMPHOCYTES # BLD AUTO: 13.8 % — SIGNIFICANT CHANGE UP (ref 13–44)
MAGNESIUM SERPL-MCNC: 2.3 MG/DL — SIGNIFICANT CHANGE UP (ref 1.6–2.6)
MCHC RBC-ENTMCNC: 27.5 PG — SIGNIFICANT CHANGE UP (ref 27–34)
MCHC RBC-ENTMCNC: 31.3 GM/DL — LOW (ref 32–36)
MCV RBC AUTO: 88 FL — SIGNIFICANT CHANGE UP (ref 80–100)
MONOCYTES # BLD AUTO: 0.97 K/UL — HIGH (ref 0–0.9)
MONOCYTES NFR BLD AUTO: 8.7 % — SIGNIFICANT CHANGE UP (ref 2–14)
NEUTROPHILS # BLD AUTO: 8.46 K/UL — HIGH (ref 1.8–7.4)
NEUTROPHILS NFR BLD AUTO: 76.2 % — SIGNIFICANT CHANGE UP (ref 43–77)
PHOSPHATE SERPL-MCNC: 1.7 MG/DL — LOW (ref 2.4–4.7)
PLATELET # BLD AUTO: 394 K/UL — SIGNIFICANT CHANGE UP (ref 150–400)
POTASSIUM SERPL-MCNC: 3.3 MMOL/L — LOW (ref 3.5–5.3)
POTASSIUM SERPL-SCNC: 3.3 MMOL/L — LOW (ref 3.5–5.3)
RBC # BLD: 3.16 M/UL — LOW (ref 3.8–5.2)
RBC # FLD: 15.7 % — HIGH (ref 10.3–14.5)
SODIUM SERPL-SCNC: 137 MMOL/L — SIGNIFICANT CHANGE UP (ref 135–145)
WBC # BLD: 11.1 K/UL — HIGH (ref 3.8–10.5)
WBC # FLD AUTO: 11.1 K/UL — HIGH (ref 3.8–10.5)

## 2021-07-05 PROCEDURE — 99232 SBSQ HOSP IP/OBS MODERATE 35: CPT

## 2021-07-05 RX ORDER — GABAPENTIN 400 MG/1
1 CAPSULE ORAL
Qty: 0 | Refills: 0 | DISCHARGE

## 2021-07-05 RX ORDER — POTASSIUM PHOSPHATE, MONOBASIC POTASSIUM PHOSPHATE, DIBASIC 236; 224 MG/ML; MG/ML
30 INJECTION, SOLUTION INTRAVENOUS ONCE
Refills: 0 | Status: DISCONTINUED | OUTPATIENT
Start: 2021-07-05 | End: 2021-07-05

## 2021-07-05 RX ORDER — FUROSEMIDE 40 MG
0 TABLET ORAL
Qty: 0 | Refills: 0 | DISCHARGE

## 2021-07-05 RX ORDER — AMLODIPINE BESYLATE 2.5 MG/1
2.5 TABLET ORAL DAILY
Refills: 0 | Status: DISCONTINUED | OUTPATIENT
Start: 2021-07-05 | End: 2021-07-10

## 2021-07-05 RX ORDER — LEVOTHYROXINE SODIUM 125 MCG
75 TABLET ORAL DAILY
Refills: 0 | Status: DISCONTINUED | OUTPATIENT
Start: 2021-07-05 | End: 2021-07-08

## 2021-07-05 RX ORDER — POTASSIUM PHOSPHATE, MONOBASIC POTASSIUM PHOSPHATE, DIBASIC 236; 224 MG/ML; MG/ML
30 INJECTION, SOLUTION INTRAVENOUS ONCE
Refills: 0 | Status: COMPLETED | OUTPATIENT
Start: 2021-07-05 | End: 2021-07-05

## 2021-07-05 RX ORDER — AMITRIPTYLINE HCL 25 MG
50 TABLET ORAL AT BEDTIME
Refills: 0 | Status: DISCONTINUED | OUTPATIENT
Start: 2021-07-05 | End: 2021-07-10

## 2021-07-05 RX ORDER — EZETIMIBE 10 MG/1
1 TABLET ORAL
Qty: 0 | Refills: 0 | DISCHARGE

## 2021-07-05 RX ORDER — FOLIC ACID 0.8 MG
1 TABLET ORAL DAILY
Refills: 0 | Status: DISCONTINUED | OUTPATIENT
Start: 2021-07-05 | End: 2021-07-10

## 2021-07-05 RX ORDER — SIMVASTATIN 20 MG/1
20 TABLET, FILM COATED ORAL AT BEDTIME
Refills: 0 | Status: DISCONTINUED | OUTPATIENT
Start: 2021-07-05 | End: 2021-07-10

## 2021-07-05 RX ORDER — PANTOPRAZOLE SODIUM 20 MG/1
0 TABLET, DELAYED RELEASE ORAL
Qty: 0 | Refills: 0 | DISCHARGE

## 2021-07-05 RX ORDER — GABAPENTIN 400 MG/1
0 CAPSULE ORAL
Qty: 0 | Refills: 0 | DISCHARGE

## 2021-07-05 RX ORDER — PANTOPRAZOLE SODIUM 20 MG/1
40 TABLET, DELAYED RELEASE ORAL
Refills: 0 | Status: DISCONTINUED | OUTPATIENT
Start: 2021-07-05 | End: 2021-07-09

## 2021-07-05 RX ORDER — LEVOTHYROXINE SODIUM 125 MCG
1 TABLET ORAL
Qty: 0 | Refills: 0 | DISCHARGE

## 2021-07-05 RX ORDER — MEGESTROL ACETATE 40 MG/ML
0 SUSPENSION ORAL
Qty: 0 | Refills: 0 | DISCHARGE

## 2021-07-05 RX ORDER — POTASSIUM CHLORIDE 20 MEQ
2 PACKET (EA) ORAL
Qty: 0 | Refills: 0 | DISCHARGE

## 2021-07-05 RX ORDER — EZETIMIBE 10 MG/1
0 TABLET ORAL
Qty: 0 | Refills: 0 | DISCHARGE

## 2021-07-05 RX ORDER — METOPROLOL TARTRATE 50 MG
100 TABLET ORAL
Refills: 0 | Status: DISCONTINUED | OUTPATIENT
Start: 2021-07-05 | End: 2021-07-05

## 2021-07-05 RX ORDER — CHOLECALCIFEROL (VITAMIN D3) 125 MCG
2000 CAPSULE ORAL DAILY
Refills: 0 | Status: DISCONTINUED | OUTPATIENT
Start: 2021-07-05 | End: 2021-07-10

## 2021-07-05 RX ORDER — ACETAMINOPHEN 500 MG
650 TABLET ORAL EVERY 6 HOURS
Refills: 0 | Status: DISCONTINUED | OUTPATIENT
Start: 2021-07-05 | End: 2021-07-05

## 2021-07-05 RX ORDER — LEVOTHYROXINE SODIUM 125 MCG
0 TABLET ORAL
Qty: 0 | Refills: 0 | DISCHARGE

## 2021-07-05 RX ORDER — POTASSIUM CHLORIDE 20 MEQ
0 PACKET (EA) ORAL
Qty: 0 | Refills: 0 | DISCHARGE

## 2021-07-05 RX ORDER — MEGESTROL ACETATE 40 MG/ML
80 SUSPENSION ORAL
Refills: 0 | Status: DISCONTINUED | OUTPATIENT
Start: 2021-07-05 | End: 2021-07-10

## 2021-07-05 RX ORDER — SENNA PLUS 8.6 MG/1
2 TABLET ORAL AT BEDTIME
Refills: 0 | Status: DISCONTINUED | OUTPATIENT
Start: 2021-07-05 | End: 2021-07-10

## 2021-07-05 RX ORDER — PREGABALIN 225 MG/1
1000 CAPSULE ORAL DAILY
Refills: 0 | Status: DISCONTINUED | OUTPATIENT
Start: 2021-07-05 | End: 2021-07-10

## 2021-07-05 RX ORDER — ACETAMINOPHEN 500 MG
975 TABLET ORAL EVERY 6 HOURS
Refills: 0 | Status: DISCONTINUED | OUTPATIENT
Start: 2021-07-05 | End: 2021-07-10

## 2021-07-05 RX ORDER — GABAPENTIN 400 MG/1
100 CAPSULE ORAL THREE TIMES A DAY
Refills: 0 | Status: DISCONTINUED | OUTPATIENT
Start: 2021-07-05 | End: 2021-07-05

## 2021-07-05 RX ORDER — POTASSIUM CHLORIDE 20 MEQ
40 PACKET (EA) ORAL ONCE
Refills: 0 | Status: COMPLETED | OUTPATIENT
Start: 2021-07-05 | End: 2021-07-05

## 2021-07-05 RX ORDER — FUROSEMIDE 40 MG
1 TABLET ORAL
Qty: 0 | Refills: 0 | DISCHARGE

## 2021-07-05 RX ORDER — GABAPENTIN 400 MG/1
100 CAPSULE ORAL
Refills: 0 | Status: DISCONTINUED | OUTPATIENT
Start: 2021-07-05 | End: 2021-07-10

## 2021-07-05 RX ORDER — METOPROLOL TARTRATE 50 MG
100 TABLET ORAL
Refills: 0 | Status: DISCONTINUED | OUTPATIENT
Start: 2021-07-05 | End: 2021-07-10

## 2021-07-05 RX ORDER — AMLODIPINE BESYLATE 2.5 MG/1
2.5 TABLET ORAL DAILY
Refills: 0 | Status: DISCONTINUED | OUTPATIENT
Start: 2021-07-05 | End: 2021-07-05

## 2021-07-05 RX ORDER — MEGESTROL ACETATE 40 MG/ML
2 SUSPENSION ORAL
Qty: 0 | Refills: 0 | DISCHARGE

## 2021-07-05 RX ORDER — IBUPROFEN 200 MG
1 TABLET ORAL
Qty: 0 | Refills: 0 | DISCHARGE

## 2021-07-05 RX ORDER — POTASSIUM CHLORIDE 20 MEQ
40 PACKET (EA) ORAL ONCE
Refills: 0 | Status: DISCONTINUED | OUTPATIENT
Start: 2021-07-05 | End: 2021-07-05

## 2021-07-05 RX ADMIN — ALBUTEROL 2.5 MILLIGRAM(S): 90 AEROSOL, METERED ORAL at 02:51

## 2021-07-05 RX ADMIN — SODIUM CHLORIDE 50 MILLILITER(S): 9 INJECTION, SOLUTION INTRAVENOUS at 05:47

## 2021-07-05 RX ADMIN — Medication 1 PATCH: at 21:39

## 2021-07-05 RX ADMIN — Medication 50 MILLIGRAM(S): at 21:24

## 2021-07-05 RX ADMIN — Medication 100 MILLIGRAM(S): at 05:20

## 2021-07-05 RX ADMIN — Medication 1 MILLIGRAM(S): at 17:04

## 2021-07-05 RX ADMIN — Medication 40 MILLIEQUIVALENT(S): at 05:20

## 2021-07-05 RX ADMIN — Medication 75 MICROGRAM(S): at 05:20

## 2021-07-05 RX ADMIN — GABAPENTIN 100 MILLIGRAM(S): 400 CAPSULE ORAL at 17:04

## 2021-07-05 RX ADMIN — SIMVASTATIN 20 MILLIGRAM(S): 20 TABLET, FILM COATED ORAL at 21:23

## 2021-07-05 RX ADMIN — Medication 2000 UNIT(S): at 17:04

## 2021-07-05 RX ADMIN — GABAPENTIN 100 MILLIGRAM(S): 400 CAPSULE ORAL at 13:37

## 2021-07-05 RX ADMIN — Medication 975 MILLIGRAM(S): at 21:16

## 2021-07-05 RX ADMIN — GABAPENTIN 100 MILLIGRAM(S): 400 CAPSULE ORAL at 05:20

## 2021-07-05 RX ADMIN — GABAPENTIN 100 MILLIGRAM(S): 400 CAPSULE ORAL at 23:22

## 2021-07-05 RX ADMIN — ALBUTEROL 2.5 MILLIGRAM(S): 90 AEROSOL, METERED ORAL at 08:25

## 2021-07-05 RX ADMIN — Medication 100 MILLIGRAM(S): at 17:04

## 2021-07-05 RX ADMIN — ENOXAPARIN SODIUM 40 MILLIGRAM(S): 100 INJECTION SUBCUTANEOUS at 13:32

## 2021-07-05 RX ADMIN — MEGESTROL ACETATE 80 MILLIGRAM(S): 40 SUSPENSION ORAL at 17:04

## 2021-07-05 RX ADMIN — Medication 975 MILLIGRAM(S): at 20:16

## 2021-07-05 RX ADMIN — AMLODIPINE BESYLATE 2.5 MILLIGRAM(S): 2.5 TABLET ORAL at 05:48

## 2021-07-05 RX ADMIN — ALBUTEROL 2.5 MILLIGRAM(S): 90 AEROSOL, METERED ORAL at 21:06

## 2021-07-05 RX ADMIN — PANTOPRAZOLE SODIUM 40 MILLIGRAM(S): 20 TABLET, DELAYED RELEASE ORAL at 05:20

## 2021-07-05 RX ADMIN — AMLODIPINE BESYLATE 2.5 MILLIGRAM(S): 2.5 TABLET ORAL at 13:31

## 2021-07-05 RX ADMIN — POTASSIUM PHOSPHATE, MONOBASIC POTASSIUM PHOSPHATE, DIBASIC 83.33 MILLIMOLE(S): 236; 224 INJECTION, SOLUTION INTRAVENOUS at 05:48

## 2021-07-05 RX ADMIN — HYDROMORPHONE HYDROCHLORIDE 0.5 MILLIGRAM(S): 2 INJECTION INTRAMUSCULAR; INTRAVENOUS; SUBCUTANEOUS at 00:57

## 2021-07-05 RX ADMIN — PANTOPRAZOLE SODIUM 40 MILLIGRAM(S): 20 TABLET, DELAYED RELEASE ORAL at 13:37

## 2021-07-05 RX ADMIN — Medication 1 PATCH: at 11:47

## 2021-07-05 RX ADMIN — PREGABALIN 1000 MICROGRAM(S): 225 CAPSULE ORAL at 17:04

## 2021-07-05 RX ADMIN — SENNA PLUS 2 TABLET(S): 8.6 TABLET ORAL at 21:23

## 2021-07-05 RX ADMIN — HYDROMORPHONE HYDROCHLORIDE 0.5 MILLIGRAM(S): 2 INJECTION INTRAMUSCULAR; INTRAVENOUS; SUBCUTANEOUS at 01:12

## 2021-07-05 NOTE — SWALLOW BEDSIDE ASSESSMENT ADULT - SLP PERTINENT HISTORY OF CURRENT PROBLEM
71yFemale presenting with: Metabolic acidosis, Post op ileus. Pt notes gagging two days ago with sip of water, however notes that at home she typically tolerates a soft diet/thin liquids

## 2021-07-05 NOTE — SWALLOW BEDSIDE ASSESSMENT ADULT - SLP GENERAL OBSERVATIONS
{Pt received A&A in bed, 0x3, cooperative, +02 via nc, Sp02 100% baseline and throughout, +NGT, pain reported on left side, but pt noted "its much better"

## 2021-07-05 NOTE — CHART NOTE - NSCHARTNOTEFT_GEN_A_CORE
SICU TRANSFER NOTE  -----------------------------  ICU Admission Date: 7/3  Transfer Date: 07-05-21 @ 15:22    Admission Diagnosis:  Lactic acidosis, Ileus, hypovolemic shock    Active Problems/injuries: Post op ileus    Procedures: 7/2: Lap Yamilka                     7/3: Diagnostic laparoscopy      Consultants:  [x] Physical Therapy pending    Medications  acetaminophen   Tablet .. 975 milliGRAM(s) Oral every 6 hours PRN  ALBUTerol    0.083% 2.5 milliGRAM(s) Nebulizer every 6 hours  amitriptyline 50 milliGRAM(s) Oral at bedtime  amLODIPine   Tablet 2.5 milliGRAM(s) Oral daily  cholecalciferol 2000 Unit(s) Oral daily  cloNIDine Patch 0.2 mG/24Hr(s) 1 patch Transdermal every 7 days  cyanocobalamin 1000 MICROGram(s) Oral daily  enoxaparin Injectable 40 milliGRAM(s) SubCutaneous daily  folic acid 1 milliGRAM(s) Oral daily  gabapentin 100 milliGRAM(s) Oral four times a day  levothyroxine 75 MICROGram(s) Oral daily  megestrol 80 milliGRAM(s) Oral two times a day  metoprolol tartrate 100 milliGRAM(s) Oral two times a day  pantoprazole    Tablet 40 milliGRAM(s) Oral before breakfast  senna 2 Tablet(s) Oral at bedtime  simvastatin 20 milliGRAM(s) Oral at bedtime      [ x] I attest I have reviewed and reconciled all medications prior to transfer    IV Fluids  lactated ringers.: Solution, 1000 milliLiter(s) infuse at 75 mL/Hr  Provider's Contact #: [phone]  lactated ringers Bolus:   1000 milliLiter(s), IV Bolus, once, infuse over 60 Minute(s), Stop After 1 Doses  sodium chloride 0.9% Bolus:   1400 milliLiter(s), IV Bolus, once, infuse over 60 Minute(s), Stop After 1 Doses     (Calc Info: 31 milliLiter(s)/Kg (ideal)/DOSE x 45.5 Kg (ideal) = 1,400 milliLiter(s)/Dose     (Requested dose was 31 milliLiter(s) per Kg (ideal))    Indication: Discontinued    Antibiotics: Discontinued      I have discussed this case with Dr. Mcnally upon transfer and all questions regarding ICU course were answered.  The following items are to be followed up:    - Tolerated trickle feeds  - Speech and swallow cleared for soft diet with thin liquids  - Continue to serial abdominal exams  - Monitor bowel function  - OOB to chair  - Aggressive pulmonary toilet  - PT/PMR  - Dispo planning

## 2021-07-05 NOTE — DIETITIAN INITIAL EVALUATION ADULT. - ALTERNATE MEANS OF NUTRITION
Normal for race by 10ml every 6  hours as tolerated to goal rate = 60ml/hr (x20 hours) 1200ml/day; 1800kcal, 80gm protein/increase tube feeding

## 2021-07-05 NOTE — PROGRESS NOTE ADULT - SUBJECTIVE AND OBJECTIVE BOX
INTERVAL HPI/OVERNIGHT EVENTS: Patient evaluated at bedside. No acute distress. No acute events overnight. Reports that her breathing is much better, abdominal pain remains. Acidosis improved, lactate cleared. Remains afebrile On empiric antibiotics. NGT in place to suction and making god urine output.     MEDICATIONS  (STANDING):  ALBUTerol    0.083% 2.5 milliGRAM(s) Nebulizer every 6 hours  ciprofloxacin   IVPB 400 milliGRAM(s) IV Intermittent daily  cloNIDine Patch 0.2 mG/24Hr(s) 1 patch Transdermal every 7 days  enoxaparin Injectable 40 milliGRAM(s) SubCutaneous daily  levothyroxine Injectable 37.5 MICROGram(s) IV Push at bedtime  metoprolol tartrate Injectable 5 milliGRAM(s) IV Push every 4 hours  metroNIDAZOLE  IVPB 500 milliGRAM(s) IV Intermittent every 8 hours  multiple electrolytes Injection Type 1 1000 milliLiter(s) (125 mL/Hr) IV Continuous <Continuous>  pantoprazole  Injectable 40 milliGRAM(s) IV Push daily  potassium chloride  10 mEq/100 mL IVPB 10 milliEquivalent(s) IV Intermittent every 1 hour    MEDICATIONS  (PRN):  HYDROmorphone  Injectable 0.5 milliGRAM(s) IV Push every 4 hours PRN Severe Pain (7 - 10)  HYDROmorphone  Injectable 0.5 milliGRAM(s) IV Push every 3 hours PRN break through pain  tetracaine/benzocaine/butamben Spray 1 Spray(s) Topical every 4 hours PRN NGT irritation      Vital Signs Last 24 Hrs  T(C): 36.9 (2021 03:08), Max: 37.3 (2021 22:00)  T(F): 98.4 (2021 03:08), Max: 99.1 (2021 22:00)  HR: 98 (2021 06:00) (92 - 138)  BP: 151/87 (2021 06:00) (140/73 - 217/101)  BP(mean): 103 (2021 06:00) (90 - 109)  RR: 19 (2021 06:00) (14 - 34)  SpO2: 92% (2021 06:00) (91% - 100%)    Physical Exam:  Gen: NAD  Eyes: PERRL ~ 3mm, EOMI,   Neurological: A&Ox3, GCS 15, No focal deficit   ENMT: Clear canals, clear throat.    Neck: Supple. NT AT, FROM no pain.  No JVD. No meningeal signs  Pulmonary: NAD, CTA, = BL .    Cardiovascular: RRR, S1, S2, No Murmurs, rubs or gallops noted.  Gastrointestinal: soft, distended, mild generalized tenderness, surgical dressings c/d/i   Extremities: NT, AT,  marked diffuse edema.    I&O's Detail    2021 07:01  -  2021 07:00  --------------------------------------------------------  IN:    IV PiggyBack: 62.5 mL    IV PiggyBack: 200 mL    IV PiggyBack: 50 mL    IV PiggyBack: 499.8 mL    IV PiggyBack: 200 mL    multiple electrolytes Injection Type 1 Bolus: 1000 mL    multiple electrolytes Injection Type 1.: 2250 mL  Total IN: 4262.3 mL    OUT:    Indwelling Catheter - Urethral (mL): 1600 mL    Nasogastric/Oral tube (mL): 450 mL  Total OUT: 2050 mL    Total NET: 2212.3 mL          LABS:                        9.0    15.94 )-----------( 439      ( 2021 05:03 )             28.4     07-04    136  |  101  |  8.1  ----------------------------<  135<H>  3.3<L>   |  23.0  |  0.67    Ca    7.9<L>      2021 05:03  Phos  2.8     07-04  Mg     2.5     07-04    TPro  7.9  /  Alb  3.9  /  TBili  0.4  /  DBili  x   /  AST  43<H>  /  ALT  53<H>  /  AlkPhos  107  07-03      Urinalysis Basic - ( 2021 05:25 )    Color: Yellow / Appearance: Clear / S.020 / pH: x  Gluc: x / Ketone: Moderate  / Bili: Negative / Urobili: Negative mg/dL   Blood: x / Protein: 100 mg/dL / Nitrite: Negative   Leuk Esterase: Trace / RBC: 25-50 /HPF / WBC 3-5   Sq Epi: x / Non Sq Epi: Occasional / Bacteria: Few

## 2021-07-05 NOTE — PROGRESS NOTE ADULT - SUBJECTIVE AND OBJECTIVE BOX
INTERVAL HPI/OVERNIGHT EVENTS/SUBJECTIVE: Pt had some nausea yesterday but later improved.  Started on Trickle feeds and lactulose.  Pt states that pain has significantly improved.  Denies CP, SOB, NV, fever chills or other CO.     ICU Vital Signs Last 24 Hrs  T(C): 37.6 (2021 00:00), Max: 37.6 (2021 19:19)  T(F): 99.7 (2021 00:00), Max: 99.7 (2021 19:19)  HR: 94 (2021 02:00) (94 - 120)  BP: 162/92 (2021 02:00) (115/80 - 175/102)  BP(mean): 111 (2021 02:00) (91 - 121)  ABP: 121/89 (2021 08:00) (113/90 - 172/85)  ABP(mean): 70 (2021 08:00) (70 - 119)  RR: 20 (2021 02:00) (5 - 31)  SpO2: 100% (2021 02:00) (88% - 100%)      I&O's Detail    2021 07:01  -  2021 07:00  --------------------------------------------------------  IN:    IV PiggyBack: 62.5 mL    IV PiggyBack: 200 mL    IV PiggyBack: 50 mL    IV PiggyBack: 499.8 mL    IV PiggyBack: 200 mL    multiple electrolytes Injection Type 1 Bolus: 1000 mL    multiple electrolytes Injection Type 1.: 2250 mL  Total IN: 4262.3 mL    OUT:    Indwelling Catheter - Urethral (mL): 1600 mL    Nasogastric/Oral tube (mL): 450 mL  Total OUT: 2050 mL    Total NET: 2212.3 mL      2021 07:01  -  2021 02:40  --------------------------------------------------------  IN:    Enteral Tube Flush: 60 mL    IV PiggyBack: 200 mL    IV PiggyBack: 187.5 mL    IV PiggyBack: 200 mL    multiple electrolytes Injection Type 1.: 1350 mL    Vital1.5: 130 mL  Total IN: 2127.5 mL    OUT:    Indwelling Catheter - Urethral (mL): 2225 mL    Nasogastric/Oral tube (mL): 350 mL  Total OUT: 2575 mL    Total NET: -447.5 mL            ABG - ( 2021 15:08 )  pH, Arterial: 7.400 pH, Blood: x     /  pCO2: 32    /  pO2: 70    / HCO3: 20    / Base Excess: -5.0  /  SaO2: 97.0                MEDICATIONS  (STANDING):  ALBUTerol    0.083% 2.5 milliGRAM(s) Nebulizer every 6 hours  amitriptyline 50 milliGRAM(s) Oral at bedtime  amLODIPine   Tablet 2.5 milliGRAM(s) Oral daily  ciprofloxacin   IVPB 400 milliGRAM(s) IV Intermittent daily  cloNIDine Patch 0.2 mG/24Hr(s) 1 patch Transdermal every 7 days  enoxaparin Injectable 40 milliGRAM(s) SubCutaneous daily  gabapentin   Solution 100 milliGRAM(s) Oral three times a day  levothyroxine 75 MICROGram(s) Oral daily  metoprolol tartrate 100 milliGRAM(s) Oral two times a day  metroNIDAZOLE  IVPB 500 milliGRAM(s) IV Intermittent every 8 hours  multiple electrolytes Injection Type 1 1000 milliLiter(s) (50 mL/Hr) IV Continuous <Continuous>  pantoprazole   Suspension 40 milliGRAM(s) Oral before breakfast  senna Syrup 10 milliLiter(s) Oral at bedtime    MEDICATIONS  (PRN):  HYDROmorphone  Injectable 0.5 milliGRAM(s) IV Push every 3 hours PRN break through pain  HYDROmorphone  Injectable 0.5 milliGRAM(s) IV Push every 4 hours PRN Severe Pain (7 - 10)      NUTRITION/IVF: Pivot @10 cc.  P-lyte @ 50    PHYSICAL EXAM:     Gen: NAD, Well appearing, No cyanosis, Pallor.    Eyes: PERRL ~ 3mm, EOMI,     Neurological: A&Ox3, GCS 15, No focal defficit.     Pulmonary: NAD, CTA, = BL .      Cardiovascular: RRR, S1, S2, No Murmurs, rubs or gallops noted.    Gastrointestinal: Mild distention.   Soft, NT.    Extremities: + 2+ Pitting edema to UE, LE BL. NT, AT, No erythema or palpable cord noted.  FROM, = 2+ pulses throughout.    LABS:  CBC Full  -  ( 2021 05:03 )  WBC Count : 15.94 K/uL  RBC Count : 3.23 M/uL  Hemoglobin : 9.0 g/dL  Hematocrit : 28.4 %  Platelet Count - Automated : 439 K/uL  Mean Cell Volume : 87.9 fl  Mean Cell Hemoglobin : 27.9 pg  Mean Cell Hemoglobin Concentration : 31.7 gm/dL  Auto Neutrophil # : 12.17 K/uL  Auto Lymphocyte # : 1.90 K/uL  Auto Monocyte # : 1.61 K/uL  Auto Eosinophil # : 0.00 K/uL  Auto Basophil # : 0.03 K/uL  Auto Neutrophil % : 76.4 %  Auto Lymphocyte % : 11.9 %  Auto Monocyte % : 10.1 %  Auto Eosinophil % : 0.0 %  Auto Basophil % : 0.2 %    07-04    136  |  101  |  8.1  ----------------------------<  135<H>  3.3<L>   |  23.0  |  0.67    Ca    7.9<L>      2021 05:03  Phos  2.8     07-04  Mg     2.5     07-04        Urinalysis Basic - ( 2021 05:25 )    Color: Yellow / Appearance: Clear / S.020 / pH: x  Gluc: x / Ketone: Moderate  / Bili: Negative / Urobili: Negative mg/dL   Blood: x / Protein: 100 mg/dL / Nitrite: Negative   Leuk Esterase: Trace / RBC: 25-50 /HPF / WBC 3-5   Sq Epi: x / Non Sq Epi: Occasional / Bacteria: Few      RECENT CULTURES:        CARDIAC MARKERS ( 2021 11:26 )  x     / 0.02 ng/mL / x     / x     / x          CAPILLARY BLOOD GLUCOSE      RADIOLOGY & ADDITIONAL STUDIES:    ASSESSMENT/PLAN:  71yFemale presenting with: Metabolic acidosis, Post op ileus.     Neurological: Tylenol PRN.  Dilaudid reserved for severe pain.    Pulmonary: Urge IS.  Pulm toilet.     Cardiovascular: Home Amlodipine, Clonidine started.     Gastrointestinal: Would consider increasing TF Rate.  FU SLP For po meds    Genitourinary: SHANT Terrell today.    Heme: SCD, Lovenox    ID: Discuss Abx plan with primary surgery team.    Lines/ Tubes: DC Invasive lines and tubes    Dispo: Stable for downgrade to Pulse ox bed.

## 2021-07-05 NOTE — PROGRESS NOTE ADULT - SUBJECTIVE AND OBJECTIVE BOX
DOWNGRADE NOTE    INTERVAL HPI/OVERNIGHT EVENTS:    Patient evaluated at bedside. No acute distress. Transferred from the SICU without issue.  NG tube removed in the SICU, BF returned.    MEDICATIONS  (STANDING):  ALBUTerol    0.083% 2.5 milliGRAM(s) Nebulizer every 6 hours  amitriptyline 50 milliGRAM(s) Oral at bedtime  amLODIPine   Tablet 2.5 milliGRAM(s) Oral daily  cholecalciferol 2000 Unit(s) Oral daily  cloNIDine Patch 0.2 mG/24Hr(s) 1 patch Transdermal every 7 days  cyanocobalamin 1000 MICROGram(s) Oral daily  enoxaparin Injectable 40 milliGRAM(s) SubCutaneous daily  folic acid 1 milliGRAM(s) Oral daily  gabapentin 100 milliGRAM(s) Oral four times a day  levothyroxine 75 MICROGram(s) Oral daily  megestrol 80 milliGRAM(s) Oral two times a day  metoprolol tartrate 100 milliGRAM(s) Oral two times a day  pantoprazole    Tablet 40 milliGRAM(s) Oral before breakfast  senna 2 Tablet(s) Oral at bedtime  simvastatin 20 milliGRAM(s) Oral at bedtime    MEDICATIONS  (PRN):  acetaminophen   Tablet .. 975 milliGRAM(s) Oral every 6 hours PRN Mild Pain (1 - 3), Moderate Pain (4 - 6)    Vital Signs Last 24 Hrs  T(C): 37 (05 Jul 2021 15:25), Max: 37.6 (04 Jul 2021 19:19)  T(F): 98.6 (05 Jul 2021 15:25), Max: 99.7 (04 Jul 2021 19:19)  HR: 108 (05 Jul 2021 15:25) (85 - 120)  BP: 140/75 (05 Jul 2021 15:25) (118/98 - 175/102)  BP(mean): 110 (05 Jul 2021 14:00) (93 - 121)  RR: 20 (05 Jul 2021 15:25) (13 - 31)  SpO2: 96% (05 Jul 2021 15:25) (88% - 100%)    Constitutional: NAD  HEENT: PERRLA, EOMI, no drainage or redness  Neck: No bruits; no thyromegaly or nodules,  No JVD  Back: Normal spine flexure, No CVA tenderness, No deformity or limitation of movement  Respiratory: Breath Sounds equal & clear to percussion & auscultation, no accessory muscle use, on supplemental oxygen   Cardiovascular: Regular rate & rhythm, normal S1, S2; no murmurs, gallops or rubs; no S3, S4  Gastrointestinal: Soft, non-tender, no hepatosplenomegaly, normal bowel sounds, abdomen still distended, no rebound or guarding, dressings on wound intact.  Extremities: Peripheral edema +, No cyanosis, clubbing   Vascular: Equal and normal pulses: 2+ peripheral pulses throughout  Neurological: GCS:   (E4/V5/M6). A&O x 3; no sensory or coordination deficits, normal reflexes, power decreased in LE's bilaterally, deconditioned,   Psychiatric: Normal mood, normal affect  Musculoskeletal: No joint pain, swelling or deformity; no limitation of movement  Skin: No rashes      I&O's Detail    04 Jul 2021 07:01  -  05 Jul 2021 07:00  --------------------------------------------------------  IN:    Enteral Tube Flush: 120 mL    IV PiggyBack: 300 mL    IV PiggyBack: 354.1 mL    IV PiggyBack: 200 mL    multiple electrolytes Injection Type 1.: 1550 mL    Vital1.5: 170 mL  Total IN: 2694.1 mL    OUT:    Indwelling Catheter - Urethral (mL): 2495 mL    Nasogastric/Oral tube (mL): 350 mL  Total OUT: 2845 mL    Total NET: -150.9 mL      05 Jul 2021 07:01  -  05 Jul 2021 16:24  --------------------------------------------------------  IN:    multiple electrolytes Injection Type 1.: 200 mL    Vital1.5: 30 mL  Total IN: 230 mL    OUT:    Indwelling Catheter - Urethral (mL): 480 mL    Voided (mL): 450 mL  Total OUT: 930 mL    Total NET: -700 mL          LABS:                        8.7    11.10 )-----------( 394      ( 05 Jul 2021 03:42 )             27.8     07-05    137  |  102  |  5.3<L>  ----------------------------<  113<H>  3.3<L>   |  25.0  |  0.51    Ca    8.0<L>      05 Jul 2021 03:42  Phos  1.7     07-05  Mg     2.3     07-05            RADIOLOGY & ADDITIONAL STUDIES:

## 2021-07-05 NOTE — PROGRESS NOTE ADULT - ATTENDING COMMENTS
Patient seen and examined on rounds. Agree with note above. Dressings removed, incisions c/d/i. abd soft, NTTP, pt needs swallow to d/c TF and start diet. Stable for transfer out of ICU.

## 2021-07-05 NOTE — SWALLOW BEDSIDE ASSESSMENT ADULT - ORAL PHASE
Within functional limits mildly slow mastication, ilaley 2* incomplete denitition, however functional. Pt reported, "I just need to take my time"

## 2021-07-05 NOTE — PROGRESS NOTE ADULT - ASSESSMENT
This is a 72 yo F who presented to [facility] ED on POD#0 s/p robotic cholecystectomy with generalized abdominal pain, SOB. Hypertensive, tachycardic, lactic acidosis, now POD#2 s/p negative diagnostic lap.    Improved after appropriate IVFs resuscitation  -Keep NPO/IVFs  -Pain control  -BP meds restarted, SBP imoroved now  -Will need PT  -Fu AM labs, trend CBC  -Continue empiric antiobiotics, follow up admission blood cx

## 2021-07-05 NOTE — DIETITIAN INITIAL EVALUATION ADULT. - PERTINENT LABORATORY DATA
07-05 Na137 mmol/L Glu 113 mg/dL<H> K+ 3.3 mmol/L<L> Cr  0.51 mg/dL BUN 5.3 mg/dL<L> Phos 1.7 mg/dL<L> Alb n/a   PAB n/a

## 2021-07-05 NOTE — PHYSICAL THERAPY INITIAL EVALUATION ADULT - ADDITIONAL COMMENTS
as per pt: resides in the private house with ramp to enter, ambulates short distances around the house only about 10 feet with RW with assistance, W/C primary mode of locomotion, an aide 1 hr 3 times a week (for personal hygiene), denies hx of falls, pt is never alone at home

## 2021-07-05 NOTE — DIETITIAN INITIAL EVALUATION ADULT. - OTHER INFO
Pt is a 71 year old female history of Asthma, CVA, HTN, HLD, Hypothyroidism, CHF, Anxiety, GERD, AFIB presents s/p laparoscopic cholecystectomy performed by Dr. Dougherty earlier today (7/2/21) with sudden onset SOB, palpitations that began roughly two hours ago. She is endorsing mild chest pain at this time. Patient states that she was initially SOB following surgery, was given nebulizer treatments in the PACU and improved. She was discharged home this afternoon. Patient developed sudden onset SOB, prompting her to come to the ED. Additionally, patient feels that she feels lightheaded at this time. The patient states that en route to the ED, she hit her right shoulder and is experiencing difficulty moving her right shoulder secondary to pain.   Pt admitted with Metabolic acidosis, Post op ileus. Pt with NG in place; trickle feeds initiated. Pt asleep at this time; unable to interview.

## 2021-07-05 NOTE — PROGRESS NOTE ADULT - ASSESSMENT
71F known history of cholecystitis, s/p percutaneous cholecystostomy and interval cholecystectomy, presented POD#0 with abdominal pain and respiratory distress, was taken for a laparoscopy diagnostic which was negative.  Admitted to the SICU post op for resuscitation.  Patient did well post op, currently POD #3 from the index procedure and POD#2 from the diagnostic laparoscopy, doing well, tolerating soft diet and having BM    - Pain control, restart home meds  - CVS - HD monitoring, restart home anti-hypertensives   - Pulm - Pulm toilet, ISS  - GI - Soft diet  -  - DC YOSELIN taylor at 8pm  - DC antibiotics  - DVT ppx

## 2021-07-05 NOTE — SWALLOW BEDSIDE ASSESSMENT ADULT - ORAL PREPARATORY PHASE
+anterior loss with cup sip; pt reports improve oral management with use of straw Within functional limits

## 2021-07-05 NOTE — SWALLOW BEDSIDE ASSESSMENT ADULT - SWALLOW EVAL: DIAGNOSIS
Oral and pharyngeal stages of swallow appear WFL for mechancial soft solids and thin liquids. Pt reports preference for soft, chopped foods 2* incomplete dentition

## 2021-07-06 ENCOUNTER — TRANSCRIPTION ENCOUNTER (OUTPATIENT)
Age: 71
End: 2021-07-06

## 2021-07-06 LAB
ANION GAP SERPL CALC-SCNC: 11 MMOL/L — SIGNIFICANT CHANGE UP (ref 5–17)
BASOPHILS # BLD AUTO: 0.03 K/UL — SIGNIFICANT CHANGE UP (ref 0–0.2)
BASOPHILS NFR BLD AUTO: 0.3 % — SIGNIFICANT CHANGE UP (ref 0–2)
BUN SERPL-MCNC: 8.9 MG/DL — SIGNIFICANT CHANGE UP (ref 8–20)
CALCIUM SERPL-MCNC: 8.8 MG/DL — SIGNIFICANT CHANGE UP (ref 8.6–10.2)
CHLORIDE SERPL-SCNC: 105 MMOL/L — SIGNIFICANT CHANGE UP (ref 98–107)
CO2 SERPL-SCNC: 22 MMOL/L — SIGNIFICANT CHANGE UP (ref 22–29)
CREAT SERPL-MCNC: 0.55 MG/DL — SIGNIFICANT CHANGE UP (ref 0.5–1.3)
EOSINOPHIL # BLD AUTO: 0.13 K/UL — SIGNIFICANT CHANGE UP (ref 0–0.5)
EOSINOPHIL NFR BLD AUTO: 1.3 % — SIGNIFICANT CHANGE UP (ref 0–6)
GLUCOSE SERPL-MCNC: 100 MG/DL — HIGH (ref 70–99)
HCT VFR BLD CALC: 31.8 % — LOW (ref 34.5–45)
HGB BLD-MCNC: 9.6 G/DL — LOW (ref 11.5–15.5)
IMM GRANULOCYTES NFR BLD AUTO: 0.8 % — SIGNIFICANT CHANGE UP (ref 0–1.5)
LYMPHOCYTES # BLD AUTO: 1.8 K/UL — SIGNIFICANT CHANGE UP (ref 1–3.3)
LYMPHOCYTES # BLD AUTO: 18.2 % — SIGNIFICANT CHANGE UP (ref 13–44)
MAGNESIUM SERPL-MCNC: 2.2 MG/DL — SIGNIFICANT CHANGE UP (ref 1.6–2.6)
MCHC RBC-ENTMCNC: 27.8 PG — SIGNIFICANT CHANGE UP (ref 27–34)
MCHC RBC-ENTMCNC: 30.2 GM/DL — LOW (ref 32–36)
MCV RBC AUTO: 92.2 FL — SIGNIFICANT CHANGE UP (ref 80–100)
MONOCYTES # BLD AUTO: 0.74 K/UL — SIGNIFICANT CHANGE UP (ref 0–0.9)
MONOCYTES NFR BLD AUTO: 7.5 % — SIGNIFICANT CHANGE UP (ref 2–14)
NEUTROPHILS # BLD AUTO: 7.13 K/UL — SIGNIFICANT CHANGE UP (ref 1.8–7.4)
NEUTROPHILS NFR BLD AUTO: 71.9 % — SIGNIFICANT CHANGE UP (ref 43–77)
PHOSPHATE SERPL-MCNC: 2.9 MG/DL — SIGNIFICANT CHANGE UP (ref 2.4–4.7)
PLATELET # BLD AUTO: 336 K/UL — SIGNIFICANT CHANGE UP (ref 150–400)
POTASSIUM SERPL-MCNC: 4.1 MMOL/L — SIGNIFICANT CHANGE UP (ref 3.5–5.3)
POTASSIUM SERPL-SCNC: 4.1 MMOL/L — SIGNIFICANT CHANGE UP (ref 3.5–5.3)
RBC # BLD: 3.45 M/UL — LOW (ref 3.8–5.2)
RBC # FLD: 16.2 % — HIGH (ref 10.3–14.5)
SODIUM SERPL-SCNC: 138 MMOL/L — SIGNIFICANT CHANGE UP (ref 135–145)
WBC # BLD: 9.91 K/UL — SIGNIFICANT CHANGE UP (ref 3.8–10.5)
WBC # FLD AUTO: 9.91 K/UL — SIGNIFICANT CHANGE UP (ref 3.8–10.5)

## 2021-07-06 RX ORDER — FUROSEMIDE 40 MG
40 TABLET ORAL DAILY
Refills: 0 | Status: DISCONTINUED | OUTPATIENT
Start: 2021-07-06 | End: 2021-07-10

## 2021-07-06 RX ORDER — KETOROLAC TROMETHAMINE 30 MG/ML
15 SYRINGE (ML) INJECTION EVERY 6 HOURS
Refills: 0 | Status: DISCONTINUED | OUTPATIENT
Start: 2021-07-06 | End: 2021-07-06

## 2021-07-06 RX ORDER — CLOPIDOGREL BISULFATE 75 MG/1
75 TABLET, FILM COATED ORAL DAILY
Refills: 0 | Status: DISCONTINUED | OUTPATIENT
Start: 2021-07-06 | End: 2021-07-10

## 2021-07-06 RX ORDER — POTASSIUM PHOSPHATE, MONOBASIC POTASSIUM PHOSPHATE, DIBASIC 236; 224 MG/ML; MG/ML
30 INJECTION, SOLUTION INTRAVENOUS ONCE
Refills: 0 | Status: COMPLETED | OUTPATIENT
Start: 2021-07-06 | End: 2021-07-06

## 2021-07-06 RX ORDER — ONDANSETRON 8 MG/1
4 TABLET, FILM COATED ORAL ONCE
Refills: 0 | Status: COMPLETED | OUTPATIENT
Start: 2021-07-06 | End: 2021-07-06

## 2021-07-06 RX ADMIN — ALBUTEROL 2.5 MILLIGRAM(S): 90 AEROSOL, METERED ORAL at 08:41

## 2021-07-06 RX ADMIN — Medication 100 MILLIGRAM(S): at 05:24

## 2021-07-06 RX ADMIN — MEGESTROL ACETATE 80 MILLIGRAM(S): 40 SUSPENSION ORAL at 17:12

## 2021-07-06 RX ADMIN — SENNA PLUS 2 TABLET(S): 8.6 TABLET ORAL at 21:40

## 2021-07-06 RX ADMIN — Medication 1 PATCH: at 07:20

## 2021-07-06 RX ADMIN — Medication 2000 UNIT(S): at 11:09

## 2021-07-06 RX ADMIN — Medication 15 MILLIGRAM(S): at 23:48

## 2021-07-06 RX ADMIN — AMLODIPINE BESYLATE 2.5 MILLIGRAM(S): 2.5 TABLET ORAL at 05:24

## 2021-07-06 RX ADMIN — Medication 100 MILLIGRAM(S): at 17:50

## 2021-07-06 RX ADMIN — PANTOPRAZOLE SODIUM 40 MILLIGRAM(S): 20 TABLET, DELAYED RELEASE ORAL at 05:25

## 2021-07-06 RX ADMIN — GABAPENTIN 100 MILLIGRAM(S): 400 CAPSULE ORAL at 23:48

## 2021-07-06 RX ADMIN — Medication 1 MILLIGRAM(S): at 11:13

## 2021-07-06 RX ADMIN — ENOXAPARIN SODIUM 40 MILLIGRAM(S): 100 INJECTION SUBCUTANEOUS at 11:08

## 2021-07-06 RX ADMIN — CLOPIDOGREL BISULFATE 75 MILLIGRAM(S): 75 TABLET, FILM COATED ORAL at 17:07

## 2021-07-06 RX ADMIN — GABAPENTIN 100 MILLIGRAM(S): 400 CAPSULE ORAL at 17:07

## 2021-07-06 RX ADMIN — ALBUTEROL 2.5 MILLIGRAM(S): 90 AEROSOL, METERED ORAL at 21:43

## 2021-07-06 RX ADMIN — SIMVASTATIN 20 MILLIGRAM(S): 20 TABLET, FILM COATED ORAL at 21:40

## 2021-07-06 RX ADMIN — PREGABALIN 1000 MICROGRAM(S): 225 CAPSULE ORAL at 17:50

## 2021-07-06 RX ADMIN — POTASSIUM PHOSPHATE, MONOBASIC POTASSIUM PHOSPHATE, DIBASIC 83.33 MILLIMOLE(S): 236; 224 INJECTION, SOLUTION INTRAVENOUS at 11:08

## 2021-07-06 RX ADMIN — Medication 75 MICROGRAM(S): at 05:24

## 2021-07-06 RX ADMIN — GABAPENTIN 100 MILLIGRAM(S): 400 CAPSULE ORAL at 05:24

## 2021-07-06 RX ADMIN — GABAPENTIN 100 MILLIGRAM(S): 400 CAPSULE ORAL at 11:13

## 2021-07-06 RX ADMIN — Medication 15 MILLIGRAM(S): at 17:20

## 2021-07-06 RX ADMIN — Medication 1 PATCH: at 07:21

## 2021-07-06 RX ADMIN — ONDANSETRON 4 MILLIGRAM(S): 8 TABLET, FILM COATED ORAL at 21:32

## 2021-07-06 RX ADMIN — Medication 15 MILLIGRAM(S): at 17:07

## 2021-07-06 RX ADMIN — MEGESTROL ACETATE 80 MILLIGRAM(S): 40 SUSPENSION ORAL at 05:24

## 2021-07-06 RX ADMIN — Medication 50 MILLIGRAM(S): at 21:40

## 2021-07-06 RX ADMIN — Medication 40 MILLIGRAM(S): at 17:07

## 2021-07-06 NOTE — DISCHARGE NOTE NURSING/CASE MANAGEMENT/SOCIAL WORK - PATIENT PORTAL LINK FT
You can access the FollowMyHealth Patient Portal offered by [facility] by registering at the following website: [email or web address]. By joining [email or web address]’s FollowMyHealth portal, you will also be able to view your health information using other applications (apps) compatible with our system.

## 2021-07-06 NOTE — PROGRESS NOTE ADULT - ATTENDING COMMENTS
Transferred from ICU overnight  Examined on AM rounds. Pt is comfortable. No chest pain or abdominal pain. Tolerating diet.  Abdomen soft, nontender. Incisions c/d/i.   Resume all home meds  Discharge planning

## 2021-07-06 NOTE — PROGRESS NOTE ADULT - REASON FOR ADMISSION
Abdominal pain and respiratory distress post op.
Metabolic acidosis
Abdominal pain and respiratory distress post-op
Metabolic acidosis

## 2021-07-06 NOTE — PROGRESS NOTE ADULT - ASSESSMENT
71F known history of cholecystitis, s/p percutaneous cholecystostomy and interval cholecystectomy, presented POD#0 with abdominal pain and respiratory distress, was taken for a laparoscopy diagnostic which was negative.  Admitted to the SICU post op for resuscitation.  Patient did well post op, currently POD #3 from the index procedure and POD#2 from the diagnostic laparoscopy, doing well, tolerating soft diet and having BM    - Pain control  - CVS - HD monitoring, restart home anti-hypertensives   - Pulm - Pulm toilet, ISS  - GI - Soft diet  -  - Passed TOV  - DC antibiotics  - DVT ppx

## 2021-07-06 NOTE — PROGRESS NOTE ADULT - SUBJECTIVE AND OBJECTIVE BOX
INTERVAL HPI/OVERNIGHT EVENTS:    Patient evaluated at bedside. No acute distress. Transferred from the SICU overnight.    MEDICATIONS  (STANDING):  ALBUTerol    0.083% 2.5 milliGRAM(s) Nebulizer every 6 hours  amitriptyline 50 milliGRAM(s) Oral at bedtime  amLODIPine   Tablet 2.5 milliGRAM(s) Oral daily  cholecalciferol 2000 Unit(s) Oral daily  cloNIDine Patch 0.2 mG/24Hr(s) 1 patch Transdermal every 7 days  cyanocobalamin 1000 MICROGram(s) Oral daily  enoxaparin Injectable 40 milliGRAM(s) SubCutaneous daily  folic acid 1 milliGRAM(s) Oral daily  gabapentin 100 milliGRAM(s) Oral four times a day  levothyroxine 75 MICROGram(s) Oral daily  megestrol 80 milliGRAM(s) Oral two times a day  metoprolol tartrate 100 milliGRAM(s) Oral two times a day  pantoprazole    Tablet 40 milliGRAM(s) Oral before breakfast  senna 2 Tablet(s) Oral at bedtime  simvastatin 20 milliGRAM(s) Oral at bedtime    MEDICATIONS  (PRN):  acetaminophen   Tablet .. 975 milliGRAM(s) Oral every 6 hours PRN Mild Pain (1 - 3), Moderate Pain (4 - 6)    Vital Signs Last 24 Hrs  T(C): 37 (05 Jul 2021 15:25), Max: 37.6 (04 Jul 2021 19:19)  T(F): 98.6 (05 Jul 2021 15:25), Max: 99.7 (04 Jul 2021 19:19)  HR: 108 (05 Jul 2021 15:25) (85 - 120)  BP: 140/75 (05 Jul 2021 15:25) (118/98 - 175/102)  BP(mean): 110 (05 Jul 2021 14:00) (93 - 121)  RR: 20 (05 Jul 2021 15:25) (13 - 31)  SpO2: 96% (05 Jul 2021 15:25) (88% - 100%)    Constitutional: NAD  HEENT: PERRLA, EOMI, no drainage or redness  Neck: No bruits; no thyromegaly or nodules,  No JVD  Back: Normal spine flexure, No CVA tenderness, No deformity or limitation of movement  Respiratory: Breath Sounds equal & clear to percussion & auscultation, no accessory muscle use, on supplemental oxygen   Cardiovascular: Regular rate & rhythm, normal S1, S2; no murmurs, gallops or rubs; no S3, S4  Gastrointestinal: Soft, non-tender, no hepatosplenomegaly, normal bowel sounds, abdomen still distended, no rebound or guarding, dressings on wound intact.  Extremities: Peripheral edema +, No cyanosis, clubbing   Vascular: Equal and normal pulses: 2+ peripheral pulses throughout  Neurological: GCS:   (E4/V5/M6). A&O x 3; no sensory or coordination deficits, normal reflexes, power decreased in LE's bilaterally, deconditioned,   Psychiatric: Normal mood, normal affect  Musculoskeletal: No joint pain, swelling or deformity; no limitation of movement  Skin: No rashes      I&O's Detail    04 Jul 2021 07:01  -  05 Jul 2021 07:00  --------------------------------------------------------  IN:    Enteral Tube Flush: 120 mL    IV PiggyBack: 300 mL    IV PiggyBack: 354.1 mL    IV PiggyBack: 200 mL    multiple electrolytes Injection Type 1.: 1550 mL    Vital1.5: 170 mL  Total IN: 2694.1 mL    OUT:    Indwelling Catheter - Urethral (mL): 2495 mL    Nasogastric/Oral tube (mL): 350 mL  Total OUT: 2845 mL    Total NET: -150.9 mL      05 Jul 2021 07:01  -  05 Jul 2021 16:24  --------------------------------------------------------  IN:    multiple electrolytes Injection Type 1.: 200 mL    Vital1.5: 30 mL  Total IN: 230 mL    OUT:    Indwelling Catheter - Urethral (mL): 480 mL    Voided (mL): 450 mL  Total OUT: 930 mL    Total NET: -700 mL          LABS:                        8.7    11.10 )-----------( 394      ( 05 Jul 2021 03:42 )             27.8     07-05    137  |  102  |  5.3<L>  ----------------------------<  113<H>  3.3<L>   |  25.0  |  0.51    Ca    8.0<L>      05 Jul 2021 03:42  Phos  1.7     07-05  Mg     2.3     07-05            RADIOLOGY & ADDITIONAL STUDIES:

## 2021-07-06 NOTE — DISCHARGE NOTE NURSING/CASE MANAGEMENT/SOCIAL WORK - NSSCNAMETXT_GEN_ALL_CORE
[facility] / [facility]

## 2021-07-07 LAB
ANION GAP SERPL CALC-SCNC: 15 MMOL/L — SIGNIFICANT CHANGE UP (ref 5–17)
BUN SERPL-MCNC: 9.6 MG/DL — SIGNIFICANT CHANGE UP (ref 8–20)
CALCIUM SERPL-MCNC: 9 MG/DL — SIGNIFICANT CHANGE UP (ref 8.6–10.2)
CHLORIDE SERPL-SCNC: 101 MMOL/L — SIGNIFICANT CHANGE UP (ref 98–107)
CO2 SERPL-SCNC: 22 MMOL/L — SIGNIFICANT CHANGE UP (ref 22–29)
CREAT SERPL-MCNC: 0.71 MG/DL — SIGNIFICANT CHANGE UP (ref 0.5–1.3)
GLUCOSE SERPL-MCNC: 118 MG/DL — HIGH (ref 70–99)
HCT VFR BLD CALC: 32.5 % — LOW (ref 34.5–45)
HGB BLD-MCNC: 10.4 G/DL — LOW (ref 11.5–15.5)
MAGNESIUM SERPL-MCNC: 2.1 MG/DL — SIGNIFICANT CHANGE UP (ref 1.6–2.6)
MCHC RBC-ENTMCNC: 28 PG — SIGNIFICANT CHANGE UP (ref 27–34)
MCHC RBC-ENTMCNC: 32 GM/DL — SIGNIFICANT CHANGE UP (ref 32–36)
MCV RBC AUTO: 87.4 FL — SIGNIFICANT CHANGE UP (ref 80–100)
PHOSPHATE SERPL-MCNC: 3.5 MG/DL — SIGNIFICANT CHANGE UP (ref 2.4–4.7)
PLATELET # BLD AUTO: 471 K/UL — HIGH (ref 150–400)
POTASSIUM SERPL-MCNC: 3.8 MMOL/L — SIGNIFICANT CHANGE UP (ref 3.5–5.3)
POTASSIUM SERPL-SCNC: 3.8 MMOL/L — SIGNIFICANT CHANGE UP (ref 3.5–5.3)
RBC # BLD: 3.72 M/UL — LOW (ref 3.8–5.2)
RBC # FLD: 16 % — HIGH (ref 10.3–14.5)
SODIUM SERPL-SCNC: 138 MMOL/L — SIGNIFICANT CHANGE UP (ref 135–145)
SURGICAL PATHOLOGY STUDY: SIGNIFICANT CHANGE UP
WBC # BLD: 11.36 K/UL — HIGH (ref 3.8–10.5)
WBC # FLD AUTO: 11.36 K/UL — HIGH (ref 3.8–10.5)

## 2021-07-07 PROCEDURE — 99222 1ST HOSP IP/OBS MODERATE 55: CPT

## 2021-07-07 PROCEDURE — 71045 X-RAY EXAM CHEST 1 VIEW: CPT | Mod: 26

## 2021-07-07 PROCEDURE — 74018 RADEX ABDOMEN 1 VIEW: CPT | Mod: 26

## 2021-07-07 RX ORDER — SODIUM CHLORIDE 9 MG/ML
1000 INJECTION, SOLUTION INTRAVENOUS
Refills: 0 | Status: DISCONTINUED | OUTPATIENT
Start: 2021-07-07 | End: 2021-07-07

## 2021-07-07 RX ORDER — SODIUM CHLORIDE 9 MG/ML
1000 INJECTION, SOLUTION INTRAVENOUS
Refills: 0 | Status: DISCONTINUED | OUTPATIENT
Start: 2021-07-07 | End: 2021-07-10

## 2021-07-07 RX ORDER — POTASSIUM CHLORIDE 20 MEQ
40 PACKET (EA) ORAL ONCE
Refills: 0 | Status: COMPLETED | OUTPATIENT
Start: 2021-07-07 | End: 2021-07-07

## 2021-07-07 RX ORDER — METOPROLOL TARTRATE 50 MG
5 TABLET ORAL ONCE
Refills: 0 | Status: COMPLETED | OUTPATIENT
Start: 2021-07-07 | End: 2021-07-07

## 2021-07-07 RX ORDER — METOPROLOL TARTRATE 50 MG
100 TABLET ORAL ONCE
Refills: 0 | Status: COMPLETED | OUTPATIENT
Start: 2021-07-07 | End: 2021-07-07

## 2021-07-07 RX ORDER — ONDANSETRON 8 MG/1
4 TABLET, FILM COATED ORAL ONCE
Refills: 0 | Status: COMPLETED | OUTPATIENT
Start: 2021-07-07 | End: 2021-07-07

## 2021-07-07 RX ADMIN — Medication 1 MILLIGRAM(S): at 10:43

## 2021-07-07 RX ADMIN — Medication 40 MILLIEQUIVALENT(S): at 21:30

## 2021-07-07 RX ADMIN — SIMVASTATIN 20 MILLIGRAM(S): 20 TABLET, FILM COATED ORAL at 21:30

## 2021-07-07 RX ADMIN — CLOPIDOGREL BISULFATE 75 MILLIGRAM(S): 75 TABLET, FILM COATED ORAL at 10:45

## 2021-07-07 RX ADMIN — Medication 5 MILLIGRAM(S): at 23:59

## 2021-07-07 RX ADMIN — Medication 2000 UNIT(S): at 10:44

## 2021-07-07 RX ADMIN — ALBUTEROL 2.5 MILLIGRAM(S): 90 AEROSOL, METERED ORAL at 15:57

## 2021-07-07 RX ADMIN — GABAPENTIN 100 MILLIGRAM(S): 400 CAPSULE ORAL at 10:50

## 2021-07-07 RX ADMIN — SENNA PLUS 2 TABLET(S): 8.6 TABLET ORAL at 21:30

## 2021-07-07 RX ADMIN — ALBUTEROL 2.5 MILLIGRAM(S): 90 AEROSOL, METERED ORAL at 08:32

## 2021-07-07 RX ADMIN — ENOXAPARIN SODIUM 40 MILLIGRAM(S): 100 INJECTION SUBCUTANEOUS at 10:43

## 2021-07-07 RX ADMIN — Medication 15 MILLIGRAM(S): at 00:03

## 2021-07-07 RX ADMIN — Medication 100 MILLIGRAM(S): at 17:09

## 2021-07-07 RX ADMIN — AMLODIPINE BESYLATE 2.5 MILLIGRAM(S): 2.5 TABLET ORAL at 10:45

## 2021-07-07 RX ADMIN — ONDANSETRON 4 MILLIGRAM(S): 8 TABLET, FILM COATED ORAL at 05:42

## 2021-07-07 RX ADMIN — Medication 40 MILLIGRAM(S): at 10:44

## 2021-07-07 RX ADMIN — Medication 50 MILLIGRAM(S): at 21:30

## 2021-07-07 RX ADMIN — Medication 100 MILLIGRAM(S): at 14:12

## 2021-07-07 RX ADMIN — Medication 75 MICROGRAM(S): at 10:45

## 2021-07-07 RX ADMIN — Medication 1 PATCH: at 18:53

## 2021-07-07 RX ADMIN — PREGABALIN 1000 MICROGRAM(S): 225 CAPSULE ORAL at 10:44

## 2021-07-07 RX ADMIN — MEGESTROL ACETATE 80 MILLIGRAM(S): 40 SUSPENSION ORAL at 17:09

## 2021-07-07 RX ADMIN — GABAPENTIN 100 MILLIGRAM(S): 400 CAPSULE ORAL at 17:18

## 2021-07-07 NOTE — CHART NOTE - NSCHARTNOTEFT_GEN_A_CORE
71F known history of cholecystitis, s/p percutaneous cholecystostomy and interval cholecystectomy, presented POD#0 with abdominal pain and respiratory distress, was taken for a laparoscopy diagnostic which was negative. Patient did well post op, currently POD #4 from the index procedure and POD#3 from the diagnostic laparoscopy.  Pt complain of nausea, vomiting.   medication has been administrated to control vomiting.

## 2021-07-07 NOTE — PROGRESS NOTE ADULT - SUBJECTIVE AND OBJECTIVE BOX
INTERVAL HPI/OVERNIGHT EVENTS:    Patient evaluated at bedside. Patient with dry heaves this morning and nauseous with episodes of emesis overnight. KUB was done with evidenve of large gastric bubble, so NGT was placed with 500cc output of gastric content. Patient reported relief and still complains of abdominal pain.    MEDICATIONS  (STANDING):  ALBUTerol    0.083% 2.5 milliGRAM(s) Nebulizer every 6 hours  amitriptyline 50 milliGRAM(s) Oral at bedtime  amLODIPine   Tablet 2.5 milliGRAM(s) Oral daily  cholecalciferol 2000 Unit(s) Oral daily  cloNIDine Patch 0.2 mG/24Hr(s) 1 patch Transdermal every 7 days  clopidogrel Tablet 75 milliGRAM(s) Oral daily  cyanocobalamin 1000 MICROGram(s) Oral daily  enoxaparin Injectable 40 milliGRAM(s) SubCutaneous daily  folic acid 1 milliGRAM(s) Oral daily  furosemide    Tablet 40 milliGRAM(s) Oral daily  gabapentin 100 milliGRAM(s) Oral four times a day  lactated ringers. 1000 milliLiter(s) (100 mL/Hr) IV Continuous <Continuous>  levothyroxine 75 MICROGram(s) Oral daily  megestrol 80 milliGRAM(s) Oral two times a day  metoprolol tartrate 100 milliGRAM(s) Oral two times a day  pantoprazole    Tablet 40 milliGRAM(s) Oral before breakfast  senna 2 Tablet(s) Oral at bedtime  simvastatin 20 milliGRAM(s) Oral at bedtime    MEDICATIONS  (PRN):  acetaminophen   Tablet .. 975 milliGRAM(s) Oral every 6 hours PRN Mild Pain (1 - 3), Moderate Pain (4 - 6)      Vital Signs Last 24 Hrs  T(C): 36.9 (07 Jul 2021 04:09), Max: 37.2 (06 Jul 2021 21:49)  T(F): 98.5 (07 Jul 2021 04:09), Max: 99 (06 Jul 2021 21:49)  HR: 80 (07 Jul 2021 08:32) (75 - 103)  BP: 137/94 (07 Jul 2021 04:09) (137/94 - 164/88)  BP(mean): --  RR: 18 (07 Jul 2021 04:09) (18 - 18)  SpO2: 95% (07 Jul 2021 08:32) (93% - 98%)    Constitutional: NAD  HEENT: PERRLA, EOMI, no drainage or redness  Neck: No bruits; no thyromegaly or nodules,  No JVD  Back: Normal spine flexure, No CVA tenderness, No deformity or limitation of movement  Respiratory: Breath Sounds equal & clear to percussion & auscultation, no accessory muscle use  Cardiovascular: Regular rate & rhythm, normal S1, S2; no murmurs, gallops or rubs; no S3, S4  Gastrointestinal: Soft, non-tender, no hepatosplenomegaly, normal bowel sounds  Extremities: No peripheral edema, No cyanosis, clubbing   Vascular: Equal and normal pulses: 2+ peripheral pulses throughout  Neurological: GCS:   (  /   /   ). A&O x 3; no sensory, motor or coordination deficits, normal reflexes  Psychiatric: Normal mood, normal affect  Musculoskeletal: No joint pain, swelling or deformity; no limitation of movement  Skin: No rashes      I&O's Detail    06 Jul 2021 07:01  -  07 Jul 2021 07:00  --------------------------------------------------------  IN:    IV PiggyBack: 900 mL  Total IN: 900 mL    OUT:    Voided (mL): 600 mL  Total OUT: 600 mL    Total NET: 300 mL          LABS:                        10.4   11.36 )-----------( 471      ( 07 Jul 2021 07:58 )             32.5     07-07    138  |  101  |  9.6  ----------------------------<  118<H>  3.8   |  22.0  |  0.71    Ca    9.0      07 Jul 2021 07:48  Phos  3.5     07-07  Mg     2.1     07-07            RADIOLOGY & ADDITIONAL STUDIES: INTERVAL HPI/OVERNIGHT EVENTS:    Patient evaluated at bedside. Patient with dry heaves this morning and nauseous with episodes of emesis overnight. KUB was done with evidenve of large gastric bubble, so NGT was placed with 500cc output of gastric content. Patient reported relief and still complains of abdominal pain. Respiratory status improving and denies SOB.    MEDICATIONS  (STANDING):  ALBUTerol    0.083% 2.5 milliGRAM(s) Nebulizer every 6 hours  amitriptyline 50 milliGRAM(s) Oral at bedtime  amLODIPine   Tablet 2.5 milliGRAM(s) Oral daily  cholecalciferol 2000 Unit(s) Oral daily  cloNIDine Patch 0.2 mG/24Hr(s) 1 patch Transdermal every 7 days  clopidogrel Tablet 75 milliGRAM(s) Oral daily  cyanocobalamin 1000 MICROGram(s) Oral daily  enoxaparin Injectable 40 milliGRAM(s) SubCutaneous daily  folic acid 1 milliGRAM(s) Oral daily  furosemide    Tablet 40 milliGRAM(s) Oral daily  gabapentin 100 milliGRAM(s) Oral four times a day  lactated ringers. 1000 milliLiter(s) (100 mL/Hr) IV Continuous <Continuous>  levothyroxine 75 MICROGram(s) Oral daily  megestrol 80 milliGRAM(s) Oral two times a day  metoprolol tartrate 100 milliGRAM(s) Oral two times a day  pantoprazole    Tablet 40 milliGRAM(s) Oral before breakfast  senna 2 Tablet(s) Oral at bedtime  simvastatin 20 milliGRAM(s) Oral at bedtime    MEDICATIONS  (PRN):  acetaminophen   Tablet .. 975 milliGRAM(s) Oral every 6 hours PRN Mild Pain (1 - 3), Moderate Pain (4 - 6)      Vital Signs Last 24 Hrs  T(C): 36.9 (07 Jul 2021 04:09), Max: 37.2 (06 Jul 2021 21:49)  T(F): 98.5 (07 Jul 2021 04:09), Max: 99 (06 Jul 2021 21:49)  HR: 80 (07 Jul 2021 08:32) (75 - 103)  BP: 137/94 (07 Jul 2021 04:09) (137/94 - 164/88)  BP(mean): --  RR: 18 (07 Jul 2021 04:09) (18 - 18)  SpO2: 95% (07 Jul 2021 08:32) (93% - 98%)    Constitutional: In mild distress due to nausea prior NGT insertion.  Respiratory: Breath Sounds equal & clear to percussion & auscultation, no accessory muscle use  Cardiovascular: Regular rate & rhythm  Gastrointestinal: Soft,  Incisional tenderness, no rebound or guarding, distended. Incisions c/d/i.  Extremities: peripheral edema, No cyanosis, clubbing       I&O's Detail    06 Jul 2021 07:01  -  07 Jul 2021 07:00  --------------------------------------------------------  IN:    IV PiggyBack: 900 mL  Total IN: 900 mL    OUT:    Voided (mL): 600 mL  Total OUT: 600 mL    Total NET: 300 mL          LABS:                        10.4   11.36 )-----------( 471      ( 07 Jul 2021 07:58 )             32.5     07-07    138  |  101  |  9.6  ----------------------------<  118<H>  3.8   |  22.0  |  0.71    Ca    9.0      07 Jul 2021 07:48  Phos  3.5     07-07  Mg     2.1     07-07            RADIOLOGY & ADDITIONAL STUDIES:

## 2021-07-07 NOTE — CONSULT NOTE ADULT - SUBJECTIVE AND OBJECTIVE BOX
70 y/o F with Hx of Hypertension, Hyperlipidemia, Afib, Obese, GERD, heart failure, Wheelchair bound, she has uncomplicated laparoscopic cholecystectomy by Dr. Dougherty, she has Patient has suspected bronchospasm prior to discharge which improved after nebulized albuterol, she was discharged home, she came to the ED next morning with SOB, Chest pain, and abd pain, she was noted to have elevated lactate with some metabolic acidosis, admitted in surgical ICU, she got IV fluids and was monitoring, her lactate and acidosis improved, she had CT angio done showed No pulmonary emboli, she her EKG done as well trops came negative, she had CT abdomen and pelvis done as well given recent surgery showed a previously seen cholecystectomy tube is no longer present. The former tube track is seen within the subcutaneous tissues. There is a small intraperitoneal fluid collection measuring approximately 1.5 x 1.5 x 4.0 cm and several tiny droplets of air adjacent to the gallbladder fundus, patient is s/p diagnostic laparoscopy and was unremarkable, currently patient denies any SOB or chest pain, she has no fever, chills, she was feeling some nausea and Abdominal pain, she had imaging done showed Ileus, she is s/p NG placed and connected with intermittent suction, medicine consult is called for medical management.         REVIEW OF SYSTEMS:    CONSTITUTIONAL: No fever, some fatigue  RESPIRATORY: No cough, No shortness of breath  CARDIOVASCULAR: No chest pain, palpitations  GASTROINTESTINAL: some abdominal, has nausea, no vomiting  NEUROLOGICAL: No headaches,  loss of strength.  MISCELLANEOUS: No joint swelling or pain     PAST MEDICAL HISTORY:    Asthma  Hypertension  Hyperlipidemia  Stroke  Polio  Afib  At risk for sleep apnea  Morbidly obese  H/O gastroesophageal reflux (GERD)  History of dyspnea  Heart failure  Wheelchair bound    PAST SURGICAL HISTORY:  S/P cholecystectomy    ALLERGIES:  penicillin (Angioedema; Rash; Urticaria)    FAMILY HISTORY: Noncontributory    SOCIAL HISTORY: Denies tobacco, EtOH, illicit substance use.     Vital Signs Last 24 Hrs  T(C): 37.2 (07 Jul 2021 14:00), Max: 37.2 (06 Jul 2021 21:49)  T(F): 99 (07 Jul 2021 14:00), Max: 99 (06 Jul 2021 21:49)  HR: 124 (07 Jul 2021 14:00) (75 - 124)  BP: 170/90 (07 Jul 2021 14:00) (137/94 - 170/90)  RR: 20 (07 Jul 2021 14:00) (18 - 20)  SpO2: 99% (07 Jul 2021 14:00) (93% - 99%)    PHYSICAL EXAM:    GENERAL: Elderly female looking comfortable  HEENT: has NG connected with intermittent suction.   NECK: soft, Supple, No JVD   CHEST/LUNG: Clear to auscultate bilaterally; No wheezing  HEART: S1S2+, Regular rate and rhythm; No murmurs  ABDOMEN: Soft, Nontender, distended; Bowel sounds are sluggish  EXTREMITIES:  1+ Peripheral Pulses, No edema  SKIN: No rashes or lesions  NEURO: AAOX3, no focal deficits, no motor r sensory loss  PSYCH: normal mood    Labs reviewed looks ok

## 2021-07-07 NOTE — PROGRESS NOTE ADULT - ASSESSMENT
71F known history of cholecystitis, s/p percutaneous cholecystostomy and interval cholecystectomy, presented POD#0 with abdominal pain and respiratory distress, was taken for a laparoscopy diagnostic which was negative.  Admitted to the SICU post op for resuscitation.  Patient did well post op, currently POD #5 from the index procedure and POD#4 from the diagnostic laparoscopy.  Patient developed ileus now and NGT was placed with 500 cc output with symptomatic improvement.     - Pain control  - Pulm - Pulm toilet, ISS  - GI - NPO and IVF due to losses  - PT and OOB  - Monitor NGT output  - DVT ppx

## 2021-07-07 NOTE — PROGRESS NOTE ADULT - ATTENDING COMMENTS
PM update:   Pt improved with NGT decompression. Denies abdominal pain this afternoon when re-evaluated.   NGT continues to put out bilious fluid  Will plan for gastrograffin challenge tomorrow morning   Medicine evaluation / recs appreciated

## 2021-07-08 ENCOUNTER — TRANSCRIPTION ENCOUNTER (OUTPATIENT)
Age: 71
End: 2021-07-08

## 2021-07-08 LAB
ALBUMIN SERPL ELPH-MCNC: 3.2 G/DL — LOW (ref 3.3–5.2)
ALP SERPL-CCNC: 83 U/L — SIGNIFICANT CHANGE UP (ref 40–120)
ALT FLD-CCNC: 35 U/L — HIGH
ANION GAP SERPL CALC-SCNC: 16 MMOL/L — SIGNIFICANT CHANGE UP (ref 5–17)
AST SERPL-CCNC: 34 U/L — HIGH
BASOPHILS # BLD AUTO: 0.04 K/UL — SIGNIFICANT CHANGE UP (ref 0–0.2)
BASOPHILS NFR BLD AUTO: 0.3 % — SIGNIFICANT CHANGE UP (ref 0–2)
BILIRUB SERPL-MCNC: 0.4 MG/DL — SIGNIFICANT CHANGE UP (ref 0.4–2)
BUN SERPL-MCNC: 10.8 MG/DL — SIGNIFICANT CHANGE UP (ref 8–20)
CALCIUM SERPL-MCNC: 8.9 MG/DL — SIGNIFICANT CHANGE UP (ref 8.6–10.2)
CHLORIDE SERPL-SCNC: 99 MMOL/L — SIGNIFICANT CHANGE UP (ref 98–107)
CO2 SERPL-SCNC: 21 MMOL/L — LOW (ref 22–29)
CREAT SERPL-MCNC: 0.61 MG/DL — SIGNIFICANT CHANGE UP (ref 0.5–1.3)
CULTURE RESULTS: SIGNIFICANT CHANGE UP
EOSINOPHIL # BLD AUTO: 0.1 K/UL — SIGNIFICANT CHANGE UP (ref 0–0.5)
EOSINOPHIL NFR BLD AUTO: 0.9 % — SIGNIFICANT CHANGE UP (ref 0–6)
GLUCOSE SERPL-MCNC: 89 MG/DL — SIGNIFICANT CHANGE UP (ref 70–99)
HCT VFR BLD CALC: 30.6 % — LOW (ref 34.5–45)
HGB BLD-MCNC: 9.6 G/DL — LOW (ref 11.5–15.5)
IMM GRANULOCYTES NFR BLD AUTO: 1.8 % — HIGH (ref 0–1.5)
LYMPHOCYTES # BLD AUTO: 1.93 K/UL — SIGNIFICANT CHANGE UP (ref 1–3.3)
LYMPHOCYTES # BLD AUTO: 16.7 % — SIGNIFICANT CHANGE UP (ref 13–44)
MAGNESIUM SERPL-MCNC: 2.2 MG/DL — SIGNIFICANT CHANGE UP (ref 1.6–2.6)
MCHC RBC-ENTMCNC: 28.1 PG — SIGNIFICANT CHANGE UP (ref 27–34)
MCHC RBC-ENTMCNC: 31.4 GM/DL — LOW (ref 32–36)
MCV RBC AUTO: 89.5 FL — SIGNIFICANT CHANGE UP (ref 80–100)
MONOCYTES # BLD AUTO: 1.08 K/UL — HIGH (ref 0–0.9)
MONOCYTES NFR BLD AUTO: 9.3 % — SIGNIFICANT CHANGE UP (ref 2–14)
NEUTROPHILS # BLD AUTO: 8.23 K/UL — HIGH (ref 1.8–7.4)
NEUTROPHILS NFR BLD AUTO: 71 % — SIGNIFICANT CHANGE UP (ref 43–77)
NT-PROBNP SERPL-SCNC: 595 PG/ML — HIGH (ref 0–300)
PHOSPHATE SERPL-MCNC: 2.1 MG/DL — LOW (ref 2.4–4.7)
PHOSPHATE SERPL-MCNC: 3.3 MG/DL — SIGNIFICANT CHANGE UP (ref 2.4–4.7)
PLATELET # BLD AUTO: 482 K/UL — HIGH (ref 150–400)
POTASSIUM SERPL-MCNC: 4.4 MMOL/L — SIGNIFICANT CHANGE UP (ref 3.5–5.3)
POTASSIUM SERPL-SCNC: 4.4 MMOL/L — SIGNIFICANT CHANGE UP (ref 3.5–5.3)
PROT SERPL-MCNC: 7 G/DL — SIGNIFICANT CHANGE UP (ref 6.6–8.7)
RBC # BLD: 3.42 M/UL — LOW (ref 3.8–5.2)
RBC # FLD: 16.5 % — HIGH (ref 10.3–14.5)
SODIUM SERPL-SCNC: 136 MMOL/L — SIGNIFICANT CHANGE UP (ref 135–145)
SPECIMEN SOURCE: SIGNIFICANT CHANGE UP
T3 SERPL-MCNC: 63 NG/DL — LOW (ref 80–200)
T4 AB SER-ACNC: 9.4 UG/DL — SIGNIFICANT CHANGE UP (ref 4.5–12)
TROPONIN T SERPL-MCNC: <0.01 NG/ML — SIGNIFICANT CHANGE UP (ref 0–0.06)
TSH SERPL-MCNC: 4.12 UIU/ML — SIGNIFICANT CHANGE UP (ref 0.27–4.2)
WBC # BLD: 11.59 K/UL — HIGH (ref 3.8–10.5)
WBC # FLD AUTO: 11.59 K/UL — HIGH (ref 3.8–10.5)

## 2021-07-08 PROCEDURE — 74018 RADEX ABDOMEN 1 VIEW: CPT | Mod: 26

## 2021-07-08 PROCEDURE — 99232 SBSQ HOSP IP/OBS MODERATE 35: CPT

## 2021-07-08 PROCEDURE — 74018 RADEX ABDOMEN 1 VIEW: CPT | Mod: 26,77

## 2021-07-08 RX ORDER — POTASSIUM PHOSPHATE, MONOBASIC POTASSIUM PHOSPHATE, DIBASIC 236; 224 MG/ML; MG/ML
15 INJECTION, SOLUTION INTRAVENOUS ONCE
Refills: 0 | Status: COMPLETED | OUTPATIENT
Start: 2021-07-08 | End: 2021-07-08

## 2021-07-08 RX ORDER — DIATRIZOATE MEGLUMINE 180 MG/ML
100 INJECTION, SOLUTION INTRAVESICAL ONCE
Refills: 0 | Status: COMPLETED | OUTPATIENT
Start: 2021-07-08 | End: 2021-07-08

## 2021-07-08 RX ORDER — POTASSIUM CHLORIDE 20 MEQ
10 PACKET (EA) ORAL
Refills: 0 | Status: DISCONTINUED | OUTPATIENT
Start: 2021-07-08 | End: 2021-07-08

## 2021-07-08 RX ORDER — LEVOTHYROXINE SODIUM 125 MCG
37.5 TABLET ORAL AT BEDTIME
Refills: 0 | Status: DISCONTINUED | OUTPATIENT
Start: 2021-07-08 | End: 2021-07-10

## 2021-07-08 RX ORDER — LABETALOL HCL 100 MG
10 TABLET ORAL EVERY 8 HOURS
Refills: 0 | Status: DISCONTINUED | OUTPATIENT
Start: 2021-07-08 | End: 2021-07-10

## 2021-07-08 RX ADMIN — ALBUTEROL 2.5 MILLIGRAM(S): 90 AEROSOL, METERED ORAL at 07:59

## 2021-07-08 RX ADMIN — Medication 50 MILLIGRAM(S): at 21:28

## 2021-07-08 RX ADMIN — Medication 2000 UNIT(S): at 13:35

## 2021-07-08 RX ADMIN — Medication 100 MILLIEQUIVALENT(S): at 13:36

## 2021-07-08 RX ADMIN — Medication 100 MILLIGRAM(S): at 05:12

## 2021-07-08 RX ADMIN — Medication 10 MILLIGRAM(S): at 12:01

## 2021-07-08 RX ADMIN — Medication 37.5 MICROGRAM(S): at 16:56

## 2021-07-08 RX ADMIN — Medication 75 MICROGRAM(S): at 05:01

## 2021-07-08 RX ADMIN — ALBUTEROL 2.5 MILLIGRAM(S): 90 AEROSOL, METERED ORAL at 14:03

## 2021-07-08 RX ADMIN — Medication 1 MILLIGRAM(S): at 13:36

## 2021-07-08 RX ADMIN — Medication 85 MILLIMOLE(S): at 16:57

## 2021-07-08 RX ADMIN — GABAPENTIN 100 MILLIGRAM(S): 400 CAPSULE ORAL at 13:35

## 2021-07-08 RX ADMIN — Medication 40 MILLIGRAM(S): at 05:04

## 2021-07-08 RX ADMIN — Medication 10 MILLIGRAM(S): at 22:42

## 2021-07-08 RX ADMIN — GABAPENTIN 100 MILLIGRAM(S): 400 CAPSULE ORAL at 05:01

## 2021-07-08 RX ADMIN — ENOXAPARIN SODIUM 40 MILLIGRAM(S): 100 INJECTION SUBCUTANEOUS at 12:02

## 2021-07-08 RX ADMIN — MEGESTROL ACETATE 80 MILLIGRAM(S): 40 SUSPENSION ORAL at 16:56

## 2021-07-08 RX ADMIN — ALBUTEROL 2.5 MILLIGRAM(S): 90 AEROSOL, METERED ORAL at 21:23

## 2021-07-08 RX ADMIN — Medication 100 MILLIGRAM(S): at 16:56

## 2021-07-08 RX ADMIN — DIATRIZOATE MEGLUMINE 100 MILLILITER(S): 180 INJECTION, SOLUTION INTRAVESICAL at 09:00

## 2021-07-08 RX ADMIN — AMLODIPINE BESYLATE 2.5 MILLIGRAM(S): 2.5 TABLET ORAL at 05:05

## 2021-07-08 RX ADMIN — PREGABALIN 1000 MICROGRAM(S): 225 CAPSULE ORAL at 13:35

## 2021-07-08 RX ADMIN — GABAPENTIN 100 MILLIGRAM(S): 400 CAPSULE ORAL at 16:56

## 2021-07-08 RX ADMIN — CLOPIDOGREL BISULFATE 75 MILLIGRAM(S): 75 TABLET, FILM COATED ORAL at 13:35

## 2021-07-08 RX ADMIN — SIMVASTATIN 20 MILLIGRAM(S): 20 TABLET, FILM COATED ORAL at 21:28

## 2021-07-08 NOTE — PROGRESS NOTE ADULT - ASSESSMENT
72 y/o F with Hx of Hypertension, Hyperlipidemia, Afib, Obese, GERD, heart failure, Wheelchair bound, she has uncomplicated laparoscopic cholecystectomy by Dr. Dougherty, she has Patient has suspected bronchospasm prior to discharge which improved after nebulized albuterol, she was discharged home, she came to the ED next morning with SOB, Chest pain, and abd pain, she was noted to have elevated lactate with some metabolic acidosis, admitted in surgical ICU, she got IV fluids and was monitoring, her lactate and acidosis improved, she had CT angio done showed No pulmonary emboli, she her EKG done as well trops came negative, she had CT abdomen and pelvis done as well given recent surgery showed a previously seen cholecystectomy tube is no longer present. The former tube track is seen within the subcutaneous tissues. There is a small intraperitoneal fluid collection measuring approximately 1.5 x 1.5 x 4.0 cm and several tiny droplets of air adjacent to the gallbladder fundus, patient is s/p diagnostic laparoscopy and was unremarkable, currently patient denies any SOB or chest pain, she has no fever, chills, she was feeling some nausea and Abdominal pain, she had imaging done showed Ileus, she is s/p NG placed and connected with intermittent suction, medicine consult is called for medical management.    Plan:     Abdominal Pain: s/p lap choli, now has Ileus, management as per Primary team, will keep IV fluids as low as possible give Hx of CHF, once started on oral diet will d/c IV fluids, will monitor electrolytes, will keep potassium >4, serial abdominal exam, NG connected with intermittent suction with no much out put.        Metabolic acidosis: resolved    SOB: Better now, CT angio negative for PE, EKG unremarkable, Troponin has been negative.     Hx of CHF: looks euvolemic, will continue with her lasix, metoprolol, will give gentle hydration if need if she is npo, but will monitor for volume over load.   ProBNP is better then before.      HTN: Will continue with metoprolol, amlodipine and Clonidine patch, will give PRN labetalol     HLD: Will continue with Simvastatin.     Afib: Will continue to monitor, rate is controlled with Metoprolol, not on anticoagulation.     DVT prophylaxis: Lovenox sc    GERD: protonix    Hypothyroidism: Levothyroxine,  TSH is WNL

## 2021-07-08 NOTE — PROGRESS NOTE ADULT - ATTENDING COMMENTS
Plan for gastrograffin challenge this morning Denies abdominal pain. Swelling in upper extremities improved   Abdomen soft, nontender.   Plan for gastrograffin challenge this morning

## 2021-07-08 NOTE — PROGRESS NOTE ADULT - SUBJECTIVE AND OBJECTIVE BOX
INTERVAL HPI/OVERNIGHT EVENTS:    Patient evaluated at bedside. No acute distress. NGT in place. Patient reports feeling better, requesting some ice chips. Patient was tachycardic and hypertensive overnight, responding to lopresor. Denies fevers, chills, nausea, emesis.  Denies chest pain, dyspnea.  Denies constipation, diarrhea. Denies headaches, dizziness, changes in vision.     MEDICATIONS  (STANDING):  ALBUTerol    0.083% 2.5 milliGRAM(s) Nebulizer every 6 hours  amitriptyline 50 milliGRAM(s) Oral at bedtime  amLODIPine   Tablet 2.5 milliGRAM(s) Oral daily  cholecalciferol 2000 Unit(s) Oral daily  cloNIDine Patch 0.2 mG/24Hr(s) 1 patch Transdermal every 7 days  clopidogrel Tablet 75 milliGRAM(s) Oral daily  cyanocobalamin 1000 MICROGram(s) Oral daily  enoxaparin Injectable 40 milliGRAM(s) SubCutaneous daily  folic acid 1 milliGRAM(s) Oral daily  furosemide    Tablet 40 milliGRAM(s) Oral daily  gabapentin 100 milliGRAM(s) Oral four times a day  lactated ringers. 1000 milliLiter(s) (100 mL/Hr) IV Continuous <Continuous>  levothyroxine 75 MICROGram(s) Oral daily  megestrol 80 milliGRAM(s) Oral two times a day  metoprolol tartrate 100 milliGRAM(s) Oral two times a day  pantoprazole    Tablet 40 milliGRAM(s) Oral before breakfast  senna 2 Tablet(s) Oral at bedtime  simvastatin 20 milliGRAM(s) Oral at bedtime    MEDICATIONS  (PRN):  acetaminophen   Tablet .. 975 milliGRAM(s) Oral every 6 hours PRN Mild Pain (1 - 3), Moderate Pain (4 - 6)      Vital Signs Last 24 Hrs  T(C): 36.9 (07 Jul 2021 04:09), Max: 37.2 (06 Jul 2021 21:49)  T(F): 98.5 (07 Jul 2021 04:09), Max: 99 (06 Jul 2021 21:49)  HR: 80 (07 Jul 2021 08:32) (75 - 103)  BP: 137/94 (07 Jul 2021 04:09) (137/94 - 164/88)  BP(mean): --  RR: 18 (07 Jul 2021 04:09) (18 - 18)  SpO2: 95% (07 Jul 2021 08:32) (93% - 98%)    Constitutional: In mild distress due to nausea prior NGT insertion.  Respiratory: Breath Sounds equal & clear to percussion & auscultation, no accessory muscle use  Cardiovascular: Regular rate & rhythm  Gastrointestinal: Soft,  Incisional tenderness, no rebound or guarding, distended. Incisions c/d/i.  Extremities: peripheral edema, No cyanosis, clubbing       I&O's Detail    06 Jul 2021 07:01  -  07 Jul 2021 07:00  --------------------------------------------------------  IN:    IV PiggyBack: 900 mL  Total IN: 900 mL    OUT:    Voided (mL): 600 mL  Total OUT: 600 mL    Total NET: 300 mL          LABS:                        10.4   11.36 )-----------( 471      ( 07 Jul 2021 07:58 )             32.5     07-07    138  |  101  |  9.6  ----------------------------<  118<H>  3.8   |  22.0  |  0.71    Ca    9.0      07 Jul 2021 07:48  Phos  3.5     07-07  Mg     2.1     07-07            RADIOLOGY & ADDITIONAL STUDIES:

## 2021-07-08 NOTE — PROGRESS NOTE ADULT - ASSESSMENT
71F known history of cholecystitis, s/p percutaneous cholecystostomy and interval cholecystectomy, presented POD#0 with abdominal pain and respiratory distress, was taken for a laparoscopy diagnostic which was negative.  Admitted to the SICU post op for resuscitation.  Patient did well post op, currently POD #6 from the index procedure and POD#5 from the diagnostic laparoscopy.  Patient developed ileus now and NGT was placed with symptomatic improvement.    -Medicine recs appreciated  -Gastrografin challenge today  - Pain control  - Pulm - Pulm toilet, ISS  - GI - NPO and IVF due to losses  - PT and OOB  - Monitor NGT output  - DVT ppx

## 2021-07-08 NOTE — PROGRESS NOTE ADULT - SUBJECTIVE AND OBJECTIVE BOX
TOBY HURT    [id number]    71y      Female    Patient is a 71y old  Female who presents with a chief complaint of Abdominal pain and respiratory distress post-op (06 Jul 2021 02:14)      INTERVAL HPI/OVERNIGHT EVENTS:    patient has not much on the NG suction, denies SOB, chest pain, nausea, vomiting, abdominal pain, she had bowel movement yesterday.     REVIEW OF SYSTEMS:    CONSTITUTIONAL: No fever, some fatigue  RESPIRATORY: No cough, No shortness of breath  CARDIOVASCULAR: No chest pain, palpitations  GASTROINTESTINAL: abdominal pain is better, no more nausea, no vomiting  NEUROLOGICAL: No headaches,  loss of strength.  MISCELLANEOUS: No joint swelling or pain       Vital Signs Last 24 Hrs  T(C): 36.4 (08 Jul 2021 08:09), Max: 37.2 (07 Jul 2021 14:00)  T(F): 97.6 (08 Jul 2021 08:09), Max: 99 (07 Jul 2021 14:00)  HR: 111 (08 Jul 2021 08:09) (88 - 124)  BP: 172/90 (08 Jul 2021 08:09) (142/82 - 178/99)  RR: 17 (08 Jul 2021 08:09) (17 - 20)  SpO2: 94% (08 Jul 2021 08:09) (93% - 99%)    PHYSICAL EXAM:    GENERAL: Elderly female looking comfortable  HEENT: has NG connected with intermittent suction.   NECK: soft, Supple, No JVD   CHEST/LUNG: Clear to auscultate bilaterally; No wheezing  HEART: S1S2+, Regular rate and rhythm; No murmurs  ABDOMEN: Soft, Nontender, non distended; Bowel sounds are better   EXTREMITIES:  1+ Peripheral Pulses, No edema  SKIN: No rashes or lesions  NEURO: AAOX3, no focal deficits, no motor r sensory loss  PSYCH: normal mood      LABS:                        9.6    11.59 )-----------( 482      ( 08 Jul 2021 07:34 )             30.6     07-08    136  |  99  |  10.8  ----------------------------<  89  4.4   |  21.0<L>  |  0.61    Ca    8.9      08 Jul 2021 07:34  Phos  2.1     07-08  Mg     2.2     07-08    TPro  7.0  /  Alb  3.2<L>  /  TBili  0.4  /  DBili  x   /  AST  34<H>  /  ALT  35<H>  /  AlkPhos  83  07-08            I&O's Summary    07 Jul 2021 07:01  -  08 Jul 2021 07:00  --------------------------------------------------------  IN: 600 mL / OUT: 2550 mL / NET: -1950 mL        MEDICATIONS  (STANDING):  ALBUTerol    0.083% 2.5 milliGRAM(s) Nebulizer every 6 hours  amitriptyline 50 milliGRAM(s) Oral at bedtime  amLODIPine   Tablet 2.5 milliGRAM(s) Oral daily  cholecalciferol 2000 Unit(s) Oral daily  cloNIDine Patch 0.2 mG/24Hr(s) 1 patch Transdermal every 7 days  clopidogrel Tablet 75 milliGRAM(s) Oral daily  cyanocobalamin 1000 MICROGram(s) Oral daily  diatrizoate meglumine/diatrizoate sodium. 100 milliLiter(s) Oral once  enoxaparin Injectable 40 milliGRAM(s) SubCutaneous daily  folic acid 1 milliGRAM(s) Oral daily  furosemide    Tablet 40 milliGRAM(s) Oral daily  gabapentin 100 milliGRAM(s) Oral four times a day  lactated ringers. 1000 milliLiter(s) (60 mL/Hr) IV Continuous <Continuous>  levothyroxine 75 MICROGram(s) Oral daily  megestrol 80 milliGRAM(s) Oral two times a day  metoprolol tartrate 100 milliGRAM(s) Oral two times a day  pantoprazole    Tablet 40 milliGRAM(s) Oral before breakfast  senna 2 Tablet(s) Oral at bedtime  simvastatin 20 milliGRAM(s) Oral at bedtime    MEDICATIONS  (PRN):  acetaminophen   Tablet .. 975 milliGRAM(s) Oral every 6 hours PRN Mild Pain (1 - 3), Moderate Pain (4 - 6)  labetalol Injectable 10 milliGRAM(s) IV Push every 8 hours PRN If systolic BP is >160

## 2021-07-09 ENCOUNTER — RESULT REVIEW (OUTPATIENT)
Age: 71
End: 2021-07-09

## 2021-07-09 LAB
ANION GAP SERPL CALC-SCNC: 17 MMOL/L — SIGNIFICANT CHANGE UP (ref 5–17)
ANISOCYTOSIS BLD QL: SLIGHT — SIGNIFICANT CHANGE UP
BASOPHILS # BLD AUTO: 0 K/UL — SIGNIFICANT CHANGE UP (ref 0–0.2)
BASOPHILS NFR BLD AUTO: 0 % — SIGNIFICANT CHANGE UP (ref 0–2)
BUN SERPL-MCNC: 7.8 MG/DL — LOW (ref 8–20)
BURR CELLS BLD QL SMEAR: PRESENT — SIGNIFICANT CHANGE UP
CALCIUM SERPL-MCNC: 8.6 MG/DL — SIGNIFICANT CHANGE UP (ref 8.6–10.2)
CHLORIDE SERPL-SCNC: 102 MMOL/L — SIGNIFICANT CHANGE UP (ref 98–107)
CO2 SERPL-SCNC: 18 MMOL/L — LOW (ref 22–29)
CREAT SERPL-MCNC: 0.52 MG/DL — SIGNIFICANT CHANGE UP (ref 0.5–1.3)
EOSINOPHIL # BLD AUTO: 0.21 K/UL — SIGNIFICANT CHANGE UP (ref 0–0.5)
EOSINOPHIL NFR BLD AUTO: 1.7 % — SIGNIFICANT CHANGE UP (ref 0–6)
GIANT PLATELETS BLD QL SMEAR: PRESENT — SIGNIFICANT CHANGE UP
GLUCOSE SERPL-MCNC: 88 MG/DL — SIGNIFICANT CHANGE UP (ref 70–99)
HCT VFR BLD CALC: 30.3 % — LOW (ref 34.5–45)
HGB BLD-MCNC: 9.5 G/DL — LOW (ref 11.5–15.5)
LYMPHOCYTES # BLD AUTO: 1.41 K/UL — SIGNIFICANT CHANGE UP (ref 1–3.3)
LYMPHOCYTES # BLD AUTO: 11.4 % — LOW (ref 13–44)
MAGNESIUM SERPL-MCNC: 2.2 MG/DL — SIGNIFICANT CHANGE UP (ref 1.6–2.6)
MANUAL SMEAR VERIFICATION: SIGNIFICANT CHANGE UP
MCHC RBC-ENTMCNC: 27.9 PG — SIGNIFICANT CHANGE UP (ref 27–34)
MCHC RBC-ENTMCNC: 31.4 GM/DL — LOW (ref 32–36)
MCV RBC AUTO: 88.9 FL — SIGNIFICANT CHANGE UP (ref 80–100)
METAMYELOCYTES # FLD: 1.8 % — HIGH (ref 0–0)
MONOCYTES # BLD AUTO: 0.65 K/UL — SIGNIFICANT CHANGE UP (ref 0–0.9)
MONOCYTES NFR BLD AUTO: 5.3 % — SIGNIFICANT CHANGE UP (ref 2–14)
MYELOCYTES NFR BLD: 2.6 % — HIGH (ref 0–0)
NEUTROPHILS # BLD AUTO: 9.53 K/UL — HIGH (ref 1.8–7.4)
NEUTROPHILS NFR BLD AUTO: 76.3 % — SIGNIFICANT CHANGE UP (ref 43–77)
NEUTS BAND # BLD: 0.9 % — SIGNIFICANT CHANGE UP (ref 0–8)
OVALOCYTES BLD QL SMEAR: SLIGHT — SIGNIFICANT CHANGE UP
PHOSPHATE SERPL-MCNC: 2.6 MG/DL — SIGNIFICANT CHANGE UP (ref 2.4–4.7)
PLAT MORPH BLD: NORMAL — SIGNIFICANT CHANGE UP
PLATELET # BLD AUTO: 395 K/UL — SIGNIFICANT CHANGE UP (ref 150–400)
POIKILOCYTOSIS BLD QL AUTO: SLIGHT — SIGNIFICANT CHANGE UP
POLYCHROMASIA BLD QL SMEAR: SLIGHT — SIGNIFICANT CHANGE UP
POTASSIUM SERPL-MCNC: 4.5 MMOL/L — SIGNIFICANT CHANGE UP (ref 3.5–5.3)
POTASSIUM SERPL-SCNC: 4.5 MMOL/L — SIGNIFICANT CHANGE UP (ref 3.5–5.3)
RBC # BLD: 3.41 M/UL — LOW (ref 3.8–5.2)
RBC # FLD: 16.4 % — HIGH (ref 10.3–14.5)
RBC BLD AUTO: ABNORMAL
SODIUM SERPL-SCNC: 137 MMOL/L — SIGNIFICANT CHANGE UP (ref 135–145)
WBC # BLD: 12.34 K/UL — HIGH (ref 3.8–10.5)
WBC # FLD AUTO: 12.34 K/UL — HIGH (ref 3.8–10.5)

## 2021-07-09 PROCEDURE — 88305 TISSUE EXAM BY PATHOLOGIST: CPT | Mod: 26

## 2021-07-09 PROCEDURE — 74018 RADEX ABDOMEN 1 VIEW: CPT | Mod: 26

## 2021-07-09 PROCEDURE — 43239 EGD BIOPSY SINGLE/MULTIPLE: CPT

## 2021-07-09 PROCEDURE — 99232 SBSQ HOSP IP/OBS MODERATE 35: CPT

## 2021-07-09 PROCEDURE — 88342 IMHCHEM/IMCYTCHM 1ST ANTB: CPT | Mod: 26

## 2021-07-09 PROCEDURE — 99222 1ST HOSP IP/OBS MODERATE 55: CPT | Mod: 25

## 2021-07-09 RX ORDER — PANTOPRAZOLE SODIUM 20 MG/1
40 TABLET, DELAYED RELEASE ORAL EVERY 12 HOURS
Refills: 0 | Status: DISCONTINUED | OUTPATIENT
Start: 2021-07-09 | End: 2021-07-10

## 2021-07-09 RX ORDER — SUCRALFATE 1 G
1 TABLET ORAL
Refills: 0 | Status: DISCONTINUED | OUTPATIENT
Start: 2021-07-09 | End: 2021-07-10

## 2021-07-09 RX ADMIN — SIMVASTATIN 20 MILLIGRAM(S): 20 TABLET, FILM COATED ORAL at 21:35

## 2021-07-09 RX ADMIN — GABAPENTIN 100 MILLIGRAM(S): 400 CAPSULE ORAL at 17:42

## 2021-07-09 RX ADMIN — Medication 100 MILLIGRAM(S): at 17:42

## 2021-07-09 RX ADMIN — Medication 2000 UNIT(S): at 11:58

## 2021-07-09 RX ADMIN — PREGABALIN 1000 MICROGRAM(S): 225 CAPSULE ORAL at 11:58

## 2021-07-09 RX ADMIN — Medication 1 PATCH: at 21:25

## 2021-07-09 RX ADMIN — GABAPENTIN 100 MILLIGRAM(S): 400 CAPSULE ORAL at 11:58

## 2021-07-09 RX ADMIN — GABAPENTIN 100 MILLIGRAM(S): 400 CAPSULE ORAL at 05:29

## 2021-07-09 RX ADMIN — CLOPIDOGREL BISULFATE 75 MILLIGRAM(S): 75 TABLET, FILM COATED ORAL at 11:58

## 2021-07-09 RX ADMIN — ALBUTEROL 2.5 MILLIGRAM(S): 90 AEROSOL, METERED ORAL at 20:11

## 2021-07-09 RX ADMIN — Medication 1 GRAM(S): at 17:42

## 2021-07-09 RX ADMIN — Medication 50 MILLIGRAM(S): at 21:35

## 2021-07-09 RX ADMIN — Medication 1 PATCH: at 21:24

## 2021-07-09 RX ADMIN — Medication 40 MILLIGRAM(S): at 05:29

## 2021-07-09 RX ADMIN — ENOXAPARIN SODIUM 40 MILLIGRAM(S): 100 INJECTION SUBCUTANEOUS at 11:59

## 2021-07-09 RX ADMIN — AMLODIPINE BESYLATE 2.5 MILLIGRAM(S): 2.5 TABLET ORAL at 05:29

## 2021-07-09 RX ADMIN — PANTOPRAZOLE SODIUM 40 MILLIGRAM(S): 20 TABLET, DELAYED RELEASE ORAL at 05:33

## 2021-07-09 RX ADMIN — Medication 1 MILLIGRAM(S): at 11:58

## 2021-07-09 RX ADMIN — Medication 37.5 MICROGRAM(S): at 21:35

## 2021-07-09 RX ADMIN — MEGESTROL ACETATE 80 MILLIGRAM(S): 40 SUSPENSION ORAL at 17:42

## 2021-07-09 RX ADMIN — MEGESTROL ACETATE 80 MILLIGRAM(S): 40 SUSPENSION ORAL at 05:32

## 2021-07-09 RX ADMIN — Medication 100 MILLIGRAM(S): at 05:33

## 2021-07-09 RX ADMIN — ALBUTEROL 2.5 MILLIGRAM(S): 90 AEROSOL, METERED ORAL at 08:54

## 2021-07-09 RX ADMIN — PANTOPRAZOLE SODIUM 40 MILLIGRAM(S): 20 TABLET, DELAYED RELEASE ORAL at 17:42

## 2021-07-09 NOTE — PROGRESS NOTE ADULT - ASSESSMENT
71F known history of cholecystitis, s/p percutaneous cholecystostomy and interval cholecystectomy, presented POD#0 with abdominal pain and respiratory distress, was taken for a laparoscopy diagnostic which was negative.  Admitted to the SICU post op for resuscitation.  Patient did well post op, currently POD #6 from the index procedure and POD#5 from the diagnostic laparoscopy.  Patient developed ileus and NGT was placed with 500 cc output with symptomatic improvement. She has been doing well clinically.     - D/c NGT this AM  - Pulm - Pulm toilet, ISS  - GI to perform endoscopy today due to concern for gastric outlet obstruction due to ulcer  - PT and OOB  - DVT ppx    Discussed with Dr. Ladonna Lorenzo MD PGY3  Department of Surgery  [facility]

## 2021-07-09 NOTE — PROGRESS NOTE ADULT - ASSESSMENT
72 y/o F with Hx of Hypertension, Hyperlipidemia, Afib, Obese, GERD, heart failure, Wheelchair bound, she has uncomplicated laparoscopic cholecystectomy by Dr. Dougherty, she has Patient has suspected bronchospasm prior to discharge which improved after nebulized albuterol, she was discharged home, she came to the ED next morning with SOB, Chest pain, and abd pain, she was noted to have elevated lactate with some metabolic acidosis, admitted in surgical ICU, she got IV fluids and was monitoring, her lactate and acidosis improved, she had CT angio done showed No pulmonary emboli, she her EKG done as well trops came negative, she had CT abdomen and pelvis done as well given recent surgery showed a previously seen cholecystectomy tube is no longer present. The former tube track is seen within the subcutaneous tissues. There is a small intraperitoneal fluid collection measuring approximately 1.5 x 1.5 x 4.0 cm and several tiny droplets of air adjacent to the gallbladder fundus, patient is s/p diagnostic laparoscopy and was unremarkable, currently patient denies any SOB or chest pain, she has no fever, chills, she was feeling some nausea and Abdominal pain, she had imaging done showed Ileus, she is s/p NG placed and connected with intermittent suction, medicine consult is called for medical management.    Plan:     Abdominal Pain: s/p lap choli, has Ileus, management as per Primary team, her NG has been d/marty, once she is start on diet, will d/c IV fluids, will monitor electrolytes, will keep potassium >4, serial abdominal exam.        Metabolic acidosis: resolved    SOB: Better now, CT angio negative for PE, EKG unremarkable, Troponin has been negative, will limit her IV fluids given Hx of CHF    Hx of CHF: looks euvolemic, will continue with her lasix, metoprolol, will give gentle hydration if need if she is npo, but will monitor for volume over load.   ProBNP is better then before.      HTN: Will continue with metoprolol, amlodipine and Clonidine patch, will give PRN labetalol     HLD: Will continue with Simvastatin.     Afib: Will continue to monitor, rate is controlled with Metoprolol, not on anticoagulation.     DVT prophylaxis: Lovenox sc    GERD: protonix    Hypothyroidism: Levothyroxine,  TSH is WNL      Medicine team is singing off, please call PRN

## 2021-07-09 NOTE — PROGRESS NOTE ADULT - SUBJECTIVE AND OBJECTIVE BOX
SUBJECTIVE:     No acute complaints this AM. Patient underwent gastrograffin challenge yesterday and contrast was in the colon.     Vital Signs Last 24 Hrs  T(C): 36.9 (09 Jul 2021 11:25), Max: 37.3 (08 Jul 2021 22:20)  T(F): 98.5 (09 Jul 2021 11:25), Max: 99.2 (08 Jul 2021 22:20)  HR: 102 (09 Jul 2021 11:25) (77 - 108)  BP: 153/86 (09 Jul 2021 11:25) (133/71 - 169/98)  BP(mean): --  RR: 18 (09 Jul 2021 11:25) (17 - 19)  SpO2: 98% (09 Jul 2021 11:25) (95% - 98%)    PHYSICAL EXAM:    Constitutional: Patient well nourish. well developed.  Respiratory:  Nonlabored on RA  Cardiovascular:  RRR  Gastrointestinal: Abdomen soft, minimally distended  Extremities: No edema    I&O's Summary    08 Jul 2021 07:01  -  09 Jul 2021 07:00  --------------------------------------------------------  IN: 420 mL / OUT: 900 mL / NET: -480 mL    I&O's Detail    08 Jul 2021 07:01  -  09 Jul 2021 07:00  --------------------------------------------------------  IN:    Lactated Ringers: 420 mL  Total IN: 420 mL    OUT:    Nasogastric/Oral tube (mL): 500 mL    Voided (mL): 400 mL  Total OUT: 900 mL    Total NET: -480 mL    MEDICATIONS  (STANDING):  ALBUTerol    0.083% 2.5 milliGRAM(s) Nebulizer every 6 hours  amitriptyline 50 milliGRAM(s) Oral at bedtime  amLODIPine   Tablet 2.5 milliGRAM(s) Oral daily  cholecalciferol 2000 Unit(s) Oral daily  cloNIDine Patch 0.2 mG/24Hr(s) 1 patch Transdermal every 7 days  clopidogrel Tablet 75 milliGRAM(s) Oral daily  cyanocobalamin 1000 MICROGram(s) Oral daily  enoxaparin Injectable 40 milliGRAM(s) SubCutaneous daily  folic acid 1 milliGRAM(s) Oral daily  furosemide    Tablet 40 milliGRAM(s) Oral daily  gabapentin 100 milliGRAM(s) Oral four times a day  lactated ringers. 1000 milliLiter(s) (60 mL/Hr) IV Continuous <Continuous>  levothyroxine Injectable 37.5 MICROGram(s) IV Push at bedtime  megestrol 80 milliGRAM(s) Oral two times a day  metoprolol tartrate 100 milliGRAM(s) Oral two times a day  pantoprazole    Tablet 40 milliGRAM(s) Oral before breakfast  senna 2 Tablet(s) Oral at bedtime  simvastatin 20 milliGRAM(s) Oral at bedtime    MEDICATIONS  (PRN):  acetaminophen   Tablet .. 975 milliGRAM(s) Oral every 6 hours PRN Mild Pain (1 - 3), Moderate Pain (4 - 6)  labetalol Injectable 10 milliGRAM(s) IV Push every 8 hours PRN If systolic BP is >160    LABS:                        9.5    12.34 )-----------( 395      ( 09 Jul 2021 05:45 )             30.3     07-09    137  |  102  |  7.8<L>  ----------------------------<  88  4.5   |  18.0<L>  |  0.52    Ca    8.6      09 Jul 2021 05:45  Phos  2.6     07-09  Mg     2.2     07-09    TPro  7.0  /  Alb  3.2<L>  /  TBili  0.4  /  DBili  x   /  AST  34<H>  /  ALT  35<H>  /  AlkPhos  83  07-08    RADIOLOGY & ADDITIONAL STUDIES: SUBJECTIVE:     No acute complaints this AM. Patient underwent gastrograffin challenge yesterday and contrast was in the colon.     Vital Signs Last 24 Hrs  T(C): 36.9 (09 Jul 2021 11:25), Max: 37.3 (08 Jul 2021 22:20)  T(F): 98.5 (09 Jul 2021 11:25), Max: 99.2 (08 Jul 2021 22:20)  HR: 102 (09 Jul 2021 11:25) (77 - 108)  BP: 153/86 (09 Jul 2021 11:25) (133/71 - 169/98)  BP(mean): --  RR: 18 (09 Jul 2021 11:25) (17 - 19)  SpO2: 98% (09 Jul 2021 11:25) (95% - 98%)    PHYSICAL EXAM:    Constitutional: Patient well nourish. well developed.  Respiratory:  Nonlabored on RA  Cardiovascular:  RRR  Gastrointestinal: Abdomen soft, minimally distended  Extremities: No edema    I&O's Summary    08 Jul 2021 07:01  -  09 Jul 2021 07:00  --------------------------------------------------------  IN: 420 mL / OUT: 900 mL / NET: -480 mL    I&O's Detail    08 Jul 2021 07:01  -  09 Jul 2021 07:00  --------------------------------------------------------  IN:    Lactated Ringers: 420 mL  Total IN: 420 mL    OUT:    Nasogastric/Oral tube (mL): 500 mL    Voided (mL): 400 mL  Total OUT: 900 mL    Total NET: -480 mL    MEDICATIONS  (STANDING):  ALBUTerol    0.083% 2.5 milliGRAM(s) Nebulizer every 6 hours  amitriptyline 50 milliGRAM(s) Oral at bedtime  amLODIPine   Tablet 2.5 milliGRAM(s) Oral daily  cholecalciferol 2000 Unit(s) Oral daily  cloNIDine Patch 0.2 mG/24Hr(s) 1 patch Transdermal every 7 days  clopidogrel Tablet 75 milliGRAM(s) Oral daily  cyanocobalamin 1000 MICROGram(s) Oral daily  enoxaparin Injectable 40 milliGRAM(s) SubCutaneous daily  folic acid 1 milliGRAM(s) Oral daily  furosemide    Tablet 40 milliGRAM(s) Oral daily  gabapentin 100 milliGRAM(s) Oral four times a day  lactated ringers. 1000 milliLiter(s) (60 mL/Hr) IV Continuous <Continuous>  levothyroxine Injectable 37.5 MICROGram(s) IV Push at bedtime  megestrol 80 milliGRAM(s) Oral two times a day  metoprolol tartrate 100 milliGRAM(s) Oral two times a day  pantoprazole    Tablet 40 milliGRAM(s) Oral before breakfast  senna 2 Tablet(s) Oral at bedtime  simvastatin 20 milliGRAM(s) Oral at bedtime    MEDICATIONS  (PRN):  acetaminophen   Tablet .. 975 milliGRAM(s) Oral every 6 hours PRN Mild Pain (1 - 3), Moderate Pain (4 - 6)  labetalol Injectable 10 milliGRAM(s) IV Push every 8 hours PRN If systolic BP is >160    LABS:                        9.5    12.34 )-----------( 395      ( 09 Jul 2021 05:45 )             30.3     07-09    137  |  102  |  7.8<L>  ----------------------------<  88  4.5   |  18.0<L>  |  0.52    Ca    8.6      09 Jul 2021 05:45  Phos  2.6     07-09  Mg     2.2     07-09    TPro  7.0  /  Alb  3.2<L>  /  TBili  0.4  /  DBili  x   /  AST  34<H>  /  ALT  35<H>  /  AlkPhos  83  07-08    RADIOLOGY & ADDITIONAL STUDIES:    EXAM:  XR KUB 1 VIEW                          PROCEDURE DATE:  07/09/2021      INTERPRETATION:  Clinical history: 71-year-old female, recent abdominal surgery.    Two views of the abdomen are compared to 7/8/2021 and demonstrates Gastrografin to the rectum. Mildly prominent small bowel loops are most consistent with ileus in the correct clinical context.    IMPRESSION:  Gastrografin progressed to the rectum.    Small bowel ileus in the correct clinical context

## 2021-07-09 NOTE — PROGRESS NOTE ADULT - SUBJECTIVE AND OBJECTIVE BOX
TOBY HURT    [id number]    71y      Female    Patient is a 71y old  Female who presents with a chief complaint of Abdominal pain and respiratory distress post-op (06 Jul 2021 02:14)      INTERVAL HPI/OVERNIGHT EVENTS:    patient ng has been out, denies SOB, chest pain, nausea, vomiting, abdominal pain.     REVIEW OF SYSTEMS:    CONSTITUTIONAL: No fever, some fatigue  RESPIRATORY: No cough, No shortness of breath  CARDIOVASCULAR: No chest pain, palpitations  GASTROINTESTINAL: abdominal pain is better, no more nausea, no vomiting  NEUROLOGICAL: No headaches,  loss of strength.  MISCELLANEOUS: No joint swelling or pain        Vital Signs Last 24 Hrs  T(C): 36.9 (09 Jul 2021 11:25), Max: 37.3 (08 Jul 2021 22:20)  T(F): 98.5 (09 Jul 2021 11:25), Max: 99.2 (08 Jul 2021 22:20)  HR: 102 (09 Jul 2021 11:25) (77 - 116)  BP: 153/86 (09 Jul 2021 11:25) (133/71 - 172/80)  BP(mean): --  RR: 18 (09 Jul 2021 11:25) (17 - 19)  SpO2: 98% (09 Jul 2021 11:25) (95% - 98%)    PHYSICAL EXAM:    GENERAL: Elderly female looking comfortable  HEENT: Atraumatic   NECK: soft, Supple, No JVD   CHEST/LUNG: Clear to auscultate bilaterally; No wheezing  HEART: S1S2+, Regular rate and rhythm; No murmurs  ABDOMEN: Soft, Nontender, non distended; Bowel sounds are better   EXTREMITIES:  1+ Peripheral Pulses, No edema  SKIN: No rashes or lesions  NEURO: AAOX3, no focal deficits, no motor r sensory loss  PSYCH: normal mood      LABS:                        9.5    12.34 )-----------( 395      ( 09 Jul 2021 05:45 )             30.3     07-09    137  |  102  |  7.8<L>  ----------------------------<  88  4.5   |  18.0<L>  |  0.52    Ca    8.6      09 Jul 2021 05:45  Phos  2.6     07-09  Mg     2.2     07-09    TPro  7.0  /  Alb  3.2<L>  /  TBili  0.4  /  DBili  x   /  AST  34<H>  /  ALT  35<H>  /  AlkPhos  83  07-08            I&O's Summary    08 Jul 2021 07:01  -  09 Jul 2021 07:00  --------------------------------------------------------  IN: 420 mL / OUT: 900 mL / NET: -480 mL        MEDICATIONS  (STANDING):  ALBUTerol    0.083% 2.5 milliGRAM(s) Nebulizer every 6 hours  amitriptyline 50 milliGRAM(s) Oral at bedtime  amLODIPine   Tablet 2.5 milliGRAM(s) Oral daily  cholecalciferol 2000 Unit(s) Oral daily  cloNIDine Patch 0.2 mG/24Hr(s) 1 patch Transdermal every 7 days  clopidogrel Tablet 75 milliGRAM(s) Oral daily  cyanocobalamin 1000 MICROGram(s) Oral daily  enoxaparin Injectable 40 milliGRAM(s) SubCutaneous daily  folic acid 1 milliGRAM(s) Oral daily  furosemide    Tablet 40 milliGRAM(s) Oral daily  gabapentin 100 milliGRAM(s) Oral four times a day  lactated ringers. 1000 milliLiter(s) (60 mL/Hr) IV Continuous <Continuous>  levothyroxine Injectable 37.5 MICROGram(s) IV Push at bedtime  megestrol 80 milliGRAM(s) Oral two times a day  metoprolol tartrate 100 milliGRAM(s) Oral two times a day  pantoprazole    Tablet 40 milliGRAM(s) Oral before breakfast  senna 2 Tablet(s) Oral at bedtime  simvastatin 20 milliGRAM(s) Oral at bedtime    MEDICATIONS  (PRN):  acetaminophen   Tablet .. 975 milliGRAM(s) Oral every 6 hours PRN Mild Pain (1 - 3), Moderate Pain (4 - 6)  labetalol Injectable 10 milliGRAM(s) IV Push every 8 hours PRN If systolic BP is >160

## 2021-07-09 NOTE — CONSULT NOTE ADULT - SUBJECTIVE AND OBJECTIVE BOX
HISTORY OF PRESENT ILLNESS: This is a 71y old woman with a past medical history significant for hypertension, hyperlipidemia, atrial fibrillation, obesity, GERD, heart failure, and wheelchair bound who had an uncomplicated laparoscopic cholecystectomy by Dr. Dougherty, suspected bronchospasm prior to discharge which improved after nebulized albuterol, and  was discharged home.  The next morning, she presented to the ED with SOB, chest and abd pain, and was noted to have elevated lactate with some metabolic acidosis.  She admitted in surgical ICU where she got IV fluids, and her lactate and acidosis improved.  She had CT angio done that showed no pulmonary emboli.  She also had CT abdomen and pelvis that showed former cholecystostomy tube track within the subcutaneous tissues and a small intraperitoneal fluid collection measuring approximately 1.5 x 1.5 x 4.0 cm and several tiny droplets of air adjacent to the gallbladder fundus.  She underwent diagnostic laparoscopy that was unremarkable.  Subsequently, she was feeling some nausea, abdominal pain, with imaging that showed ileus, she is s/p NG placed, now removed.  She reports passing flatus and bowel movements.  GI consulted for persistent gastric bubble and concern for GOO.  Patient currently denies nausea or vomiting.    ROS: A 14-point review of systems was completed and was otherwise negative save what was reported in the HPI.    PAST MEDICAL/SURGICAL HISTORY:  Hypertension  Hyperlipidemia  Stroke; 2006, right side weakness  Polio  Afib, Pt not on any medication, had monitor x1 month  did not reveal afib April to May 2021  At risk for sleep apnea  Morbidly obese, BMI 52.1  H/O gastroesophageal reflux (GERD)  History of dyspnea  Heart failure  Wheelchair bound  S/P cholecystectomy, 7/2/2021    SOCIAL HISTORY:  - TOBACCO: Denies  - ALCOHOL: Denies  - ILLICIT DRUG USE: Denies    FAMILY HISTORY:  FH: hypertension (Father, Mother)  FH: liver cancer (Father)    HOME MEDICATIONS:  albuterol sulfate 2.5 mg/3 mL (0.083 %) solution for nebulization:  (02 Jul 2021 05:51)  amitriptyline 50 mg oral tablet: 1 tab(s) orally once a day (at bedtime) (02 Jul 2021 05:51)  clonidine HCl 0.2 mg tablet:  (02 Jul 2021 05:51)  clopidogrel 75 mg tablet: 1 tab(s) orally once a day (05 Jul 2021 13:30)  docusate sodium 100 mg oral tablet: 2 tab(s) orally once a day (at bedtime), As Needed (02 Jul 2021 05:51)  ezetimibe 10 mg tablet: 1 tab(s) orally once a day (05 Jul 2021 13:28)  folic acid 1 mg oral tablet: 1 tab(s) orally once a day (02 Jul 2021 05:51)  furosemide 40 mg tablet: 1 tab(s) orally once a day (05 Jul 2021 13:32)  gabapentin 100 mg capsule: 1 cap(s) orally 4 times a day (05 Jul 2021 13:27)  levothyroxine 75 mcg tablet: 1 tab(s) orally once a day (05 Jul 2021 13:39)  megestrol 40 mg tablet: 2 tab(s) orally 2 times a day (05 Jul 2021 13:31)  metoprolol succinate  mg tablet,extended release 24 hr:  (02 Jul 2021 05:51)  pantoprazole 40 mg tablet,delayed release: 1 tab(s) orally once a day (05 Jul 2021 13:38)  potassium chloride ER 10 mEq capsule,extended release: 2 cap(s) orally once a day (05 Jul 2021 13:38)  Senna 8.6 mg oral tablet: 1 tab(s) orally 2 times a day (02 Jul 2021 05:51)  simvastatin 20 mg oral tablet: 1 tab(s) orally once a day (at bedtime) (02 Jul 2021 05:51)  Tylenol 325 mg oral tablet: 2 tab(s) orally every 6 hours (02 Jul 2021 10:46)  Vitamin B12 1000 mcg oral tablet: 1 tab(s) orally once a day (02 Jul 2021 05:51)  Vitamin D3 2000 intl units (50 mcg) oral capsule: orally once a day (02 Jul 2021 05:51)    INPATIENT MEDICATIONS:  MEDICATIONS  (STANDING):  ALBUTerol    0.083% 2.5 milliGRAM(s) Nebulizer every 6 hours  amitriptyline 50 milliGRAM(s) Oral at bedtime  amLODIPine   Tablet 2.5 milliGRAM(s) Oral daily  cholecalciferol 2000 Unit(s) Oral daily  cloNIDine Patch 0.2 mG/24Hr(s) 1 patch Transdermal every 7 days  clopidogrel Tablet 75 milliGRAM(s) Oral daily  cyanocobalamin 1000 MICROGram(s) Oral daily  enoxaparin Injectable 40 milliGRAM(s) SubCutaneous daily  folic acid 1 milliGRAM(s) Oral daily  furosemide    Tablet 40 milliGRAM(s) Oral daily  gabapentin 100 milliGRAM(s) Oral four times a day  lactated ringers. 1000 milliLiter(s) (60 mL/Hr) IV Continuous <Continuous>  levothyroxine Injectable 37.5 MICROGram(s) IV Push at bedtime  megestrol 80 milliGRAM(s) Oral two times a day  metoprolol tartrate 100 milliGRAM(s) Oral two times a day  pantoprazole    Tablet 40 milliGRAM(s) Oral before breakfast  senna 2 Tablet(s) Oral at bedtime  simvastatin 20 milliGRAM(s) Oral at bedtime    MEDICATIONS  (PRN):  acetaminophen   Tablet .. 975 milliGRAM(s) Oral every 6 hours PRN Mild Pain (1 - 3), Moderate Pain (4 - 6)  labetalol Injectable 10 milliGRAM(s) IV Push every 8 hours PRN If systolic BP is >160    ALLERGIES:  penicillin (Angioedema; Rash; Urticaria)    T(C): 36.9 (07-09-21 @ 11:25), Max: 37.3 (07-08-21 @ 22:20)  HR: 102 (07-09-21 @ 11:25) (77 - 116)  BP: 153/86 (07-09-21 @ 11:25) (133/71 - 169/98)  RR: 18 (07-09-21 @ 11:25) (17 - 19)  SpO2: 98% (07-09-21 @ 11:25) (95% - 98%)    07-08-21 @ 07:01  -  07-09-21 @ 07:00  --------------------------------------------------------  IN: 420 mL / OUT: 900 mL / NET: -480 mL      PHYSICAL EXAM:  Constitutional: Well-developed, obese hirsute  woman in no apparent distress  Eyes: Sclerae anicteric, conjunctivae normal  ENMT: Mucus membranes moist, no oropharyngeal thrush noted  Neck: No thyroid nodules appreciated, no significant cervical or supraclavicular lymphadenopathy  Respiratory: Breathing nonlabored; clear to auscultation  Cardiovascular: Regular rate and rhythm  Gastrointestinal: Soft, nontender, distended and tympanitic to percussion, normoactive bowel sounds; no hepatosplenomegaly appreciated; no rebound tenderness or involuntary guarding  Extremities: No clubbing, cyanosis or edema  Neurological: Alert and oriented to person, place and time; no asterixis  Skin: No jaundice  Lymph Nodes: No significant lymphadenopathy  Musculoskeletal: No significant peripheral atrophy  Psychiatric: Affect and mood appropriate      LABS:             9.5    12.34 )-----------( 395      ( 07-09 @ 05:45 )             30.3                9.6    11.59 )-----------( 482      ( 07-08 @ 07:34 )             30.6                10.4   11.36 )-----------( 471      ( 07-07 @ 07:58 )             32.5         07-09    137  |  102  |  7.8<L>  ----------------------------<  88  4.5   |  18.0<L>  |  0.52    Ca    8.6      09 Jul 2021 05:45  Phos  2.6     07-09  Mg     2.2     07-09    TPro  7.0  /  Alb  3.2<L>  /  TBili  0.4  /  DBili  x   /  AST  34<H>  /  ALT  35<H>  /  AlkPhos  83  07-08    LIVER FUNCTIONS - ( 08 Jul 2021 07:34 )  Alb: 3.2 g/dL / Pro: 7.0 g/dL / ALK PHOS: 83 U/L / ALT: 35 U/L / AST: 34 U/L / GGT: x             IMAGING: I personally reviewed the KUB, and I agree with the radiologist's interpretation as described below:  < from: Xray Kidney Ureter Bladder (07.09.21 @ 10:00) >   EXAM:  XR KUB 1 VIEW                          PROCEDURE DATE:  07/09/2021      INTERPRETATION:  Clinical history: 71-year-old female, recent abdominal surgery.    Two views of the abdomen are compared to 7/8/2021 and demonstrates Gastrografin to the rectum. Mildly prominent small bowel loops are most consistent with ileus in the correct clinical context.    IMPRESSION:  Gastrografin progressed to the rectum.    Small bowel ileus in the correct clinical context    --- End of Report ---    < end of copied text >

## 2021-07-09 NOTE — PROGRESS NOTE ADULT - ATTENDING COMMENTS
Pt states she feels better, denies nausea. NGT removed  Abdomen soft, nontender  Labs reviewed   KUBs reviewed   GI consult called - concern for gastric outlet obstruction possible related to ulcer disease

## 2021-07-09 NOTE — CONSULT NOTE ADULT - ASSESSMENT
70 y/o F w/ multiple medical problems POD 0 s/p uncomplicated laparoscopic cholecystectomy representing to ED w/ complaints of SOB, Abd pain. No obvious pathology for patient condition on CTA Chest/abdomen.   -Awaiting plan from covering attending physician Dr. Benson.       Patient seen and evaluated with PGY-3 resident Peter Gonzales
Chronically ill 71-year-old woman who underwent uneventful CCY one week ago whose postoperative course has been complicated by lactic acidosis, respiratory failure, and ileus.  She has a persistent dilated stomach, raising question of underlying GOO.  Will plan on EGD today for further evaluation.
72 y/o F with Hx of Hypertension, Hyperlipidemia, Afib, Obese, GERD, heart failure, Wheelchair bound, she has uncomplicated laparoscopic cholecystectomy by Dr. Dougherty, she has Patient has suspected bronchospasm prior to discharge which improved after nebulized albuterol, she was discharged home, she came to the ED next morning with SOB, Chest pain, and abd pain, she was noted to have elevated lactate with some metabolic acidosis, admitted in surgical ICU, she got IV fluids and was monitoring, her lactate and acidosis improved, she had CT angio done showed No pulmonary emboli, she her EKG done as well trops came negative, she had CT abdomen and pelvis done as well given recent surgery showed a previously seen cholecystectomy tube is no longer present. The former tube track is seen within the subcutaneous tissues. There is a small intraperitoneal fluid collection measuring approximately 1.5 x 1.5 x 4.0 cm and several tiny droplets of air adjacent to the gallbladder fundus, patient is s/p diagnostic laparoscopy and was unremarkable, currently patient denies any SOB or chest pain, she has no fever, chills, she was feeling some nausea and Abdominal pain, she had imaging done showed Ileus, she is s/p NG placed and connected with intermittent suction, medicine consult is called for medical management.    Plan:     Abdominal Pain: s/p lap choli, now has Ileus, management as per Primary team, will keep IV fluids as low as possible give Hx of CHF, will monitor electrolytes, will keep potassium >4, serial abdominal exam, NG connected with intermittent suction        Metabolic acidosis: resolved    SOB: Better now, CT angio negative for PE, EKG unremarkable, Troponin has been negative.     Hx of CHF: looks euvolemic, will continue with her lasix, metoprolol, will give gentle hydration if need if she is npo, but will monitor for volume over load.   will get ProBNP.     HTN: Will continue with metoprolol, amlodipine and Clonidine patch.     HLD: Will continue with Simvastatin    Afib: Will continue to monitor, rate is controlled with Metoprolol, not on anticoagulation.     DVT prophylaxis: Lovenox sc    GERD: protonix    Hypothyroidism: Levothyroxine, will get TSH

## 2021-07-10 ENCOUNTER — TRANSCRIPTION ENCOUNTER (OUTPATIENT)
Age: 71
End: 2021-07-10

## 2021-07-10 VITALS
DIASTOLIC BLOOD PRESSURE: 69 MMHG | OXYGEN SATURATION: 98 % | SYSTOLIC BLOOD PRESSURE: 138 MMHG | RESPIRATION RATE: 18 BRPM | HEART RATE: 98 BPM

## 2021-07-10 LAB
BASOPHILS # BLD AUTO: 0.03 K/UL — SIGNIFICANT CHANGE UP (ref 0–0.2)
BASOPHILS NFR BLD AUTO: 0.3 % — SIGNIFICANT CHANGE UP (ref 0–2)
EOSINOPHIL # BLD AUTO: 0.15 K/UL — SIGNIFICANT CHANGE UP (ref 0–0.5)
EOSINOPHIL NFR BLD AUTO: 1.4 % — SIGNIFICANT CHANGE UP (ref 0–6)
HCT VFR BLD CALC: 34.8 % — SIGNIFICANT CHANGE UP (ref 34.5–45)
HGB BLD-MCNC: 11.3 G/DL — LOW (ref 11.5–15.5)
IMM GRANULOCYTES NFR BLD AUTO: 1.4 % — SIGNIFICANT CHANGE UP (ref 0–1.5)
LYMPHOCYTES # BLD AUTO: 2.3 K/UL — SIGNIFICANT CHANGE UP (ref 1–3.3)
LYMPHOCYTES # BLD AUTO: 20.8 % — SIGNIFICANT CHANGE UP (ref 13–44)
MCHC RBC-ENTMCNC: 28.1 PG — SIGNIFICANT CHANGE UP (ref 27–34)
MCHC RBC-ENTMCNC: 32.5 GM/DL — SIGNIFICANT CHANGE UP (ref 32–36)
MCV RBC AUTO: 86.6 FL — SIGNIFICANT CHANGE UP (ref 80–100)
MONOCYTES # BLD AUTO: 0.92 K/UL — HIGH (ref 0–0.9)
MONOCYTES NFR BLD AUTO: 8.3 % — SIGNIFICANT CHANGE UP (ref 2–14)
NEUTROPHILS # BLD AUTO: 7.53 K/UL — HIGH (ref 1.8–7.4)
NEUTROPHILS NFR BLD AUTO: 67.8 % — SIGNIFICANT CHANGE UP (ref 43–77)
PLATELET # BLD AUTO: 614 K/UL — HIGH (ref 150–400)
RBC # BLD: 4.02 M/UL — SIGNIFICANT CHANGE UP (ref 3.8–5.2)
RBC # FLD: 16.2 % — HIGH (ref 10.3–14.5)
WBC # BLD: 11.08 K/UL — HIGH (ref 3.8–10.5)
WBC # FLD AUTO: 11.08 K/UL — HIGH (ref 3.8–10.5)

## 2021-07-10 PROCEDURE — 97530 THERAPEUTIC ACTIVITIES: CPT

## 2021-07-10 PROCEDURE — 87040 BLOOD CULTURE FOR BACTERIA: CPT

## 2021-07-10 PROCEDURE — 86923 COMPATIBILITY TEST ELECTRIC: CPT

## 2021-07-10 PROCEDURE — 84295 ASSAY OF SERUM SODIUM: CPT

## 2021-07-10 PROCEDURE — 82947 ASSAY GLUCOSE BLOOD QUANT: CPT

## 2021-07-10 PROCEDURE — 84480 ASSAY TRIIODOTHYRONINE (T3): CPT

## 2021-07-10 PROCEDURE — 99233 SBSQ HOSP IP/OBS HIGH 50: CPT

## 2021-07-10 PROCEDURE — 73030 X-RAY EXAM OF SHOULDER: CPT

## 2021-07-10 PROCEDURE — 88342 IMHCHEM/IMCYTCHM 1ST ANTB: CPT

## 2021-07-10 PROCEDURE — 86769 SARS-COV-2 COVID-19 ANTIBODY: CPT

## 2021-07-10 PROCEDURE — 36415 COLL VENOUS BLD VENIPUNCTURE: CPT

## 2021-07-10 PROCEDURE — 86850 RBC ANTIBODY SCREEN: CPT

## 2021-07-10 PROCEDURE — 94760 N-INVAS EAR/PLS OXIMETRY 1: CPT

## 2021-07-10 PROCEDURE — 80048 BASIC METABOLIC PNL TOTAL CA: CPT

## 2021-07-10 PROCEDURE — 86901 BLOOD TYPING SEROLOGIC RH(D): CPT

## 2021-07-10 PROCEDURE — 83605 ASSAY OF LACTIC ACID: CPT

## 2021-07-10 PROCEDURE — 74177 CT ABD & PELVIS W/CONTRAST: CPT

## 2021-07-10 PROCEDURE — 84443 ASSAY THYROID STIM HORMONE: CPT

## 2021-07-10 PROCEDURE — 85018 HEMOGLOBIN: CPT

## 2021-07-10 PROCEDURE — 97116 GAIT TRAINING THERAPY: CPT

## 2021-07-10 PROCEDURE — 85027 COMPLETE CBC AUTOMATED: CPT

## 2021-07-10 PROCEDURE — 85014 HEMATOCRIT: CPT

## 2021-07-10 PROCEDURE — 84484 ASSAY OF TROPONIN QUANT: CPT

## 2021-07-10 PROCEDURE — 87635 SARS-COV-2 COVID-19 AMP PRB: CPT

## 2021-07-10 PROCEDURE — 84100 ASSAY OF PHOSPHORUS: CPT

## 2021-07-10 PROCEDURE — 82803 BLOOD GASES ANY COMBINATION: CPT

## 2021-07-10 PROCEDURE — 80053 COMPREHEN METABOLIC PANEL: CPT

## 2021-07-10 PROCEDURE — 96374 THER/PROPH/DIAG INJ IV PUSH: CPT

## 2021-07-10 PROCEDURE — 71275 CT ANGIOGRAPHY CHEST: CPT

## 2021-07-10 PROCEDURE — 83735 ASSAY OF MAGNESIUM: CPT

## 2021-07-10 PROCEDURE — 93005 ELECTROCARDIOGRAM TRACING: CPT

## 2021-07-10 PROCEDURE — 99285 EMERGENCY DEPT VISIT HI MDM: CPT | Mod: 25

## 2021-07-10 PROCEDURE — 84436 ASSAY OF TOTAL THYROXINE: CPT

## 2021-07-10 PROCEDURE — 82435 ASSAY OF BLOOD CHLORIDE: CPT

## 2021-07-10 PROCEDURE — 94640 AIRWAY INHALATION TREATMENT: CPT

## 2021-07-10 PROCEDURE — C9399: CPT

## 2021-07-10 PROCEDURE — 83880 ASSAY OF NATRIURETIC PEPTIDE: CPT

## 2021-07-10 PROCEDURE — 92610 EVALUATE SWALLOWING FUNCTION: CPT

## 2021-07-10 PROCEDURE — 84132 ASSAY OF SERUM POTASSIUM: CPT

## 2021-07-10 PROCEDURE — 85025 COMPLETE CBC W/AUTO DIFF WBC: CPT

## 2021-07-10 PROCEDURE — 81001 URINALYSIS AUTO W/SCOPE: CPT

## 2021-07-10 PROCEDURE — P9016: CPT

## 2021-07-10 PROCEDURE — 82330 ASSAY OF CALCIUM: CPT

## 2021-07-10 PROCEDURE — 88305 TISSUE EXAM BY PATHOLOGIST: CPT

## 2021-07-10 PROCEDURE — 71045 X-RAY EXAM CHEST 1 VIEW: CPT

## 2021-07-10 PROCEDURE — 86900 BLOOD TYPING SEROLOGIC ABO: CPT

## 2021-07-10 PROCEDURE — 96375 TX/PRO/DX INJ NEW DRUG ADDON: CPT

## 2021-07-10 PROCEDURE — 97163 PT EVAL HIGH COMPLEX 45 MIN: CPT

## 2021-07-10 PROCEDURE — 74018 RADEX ABDOMEN 1 VIEW: CPT

## 2021-07-10 RX ORDER — AMLODIPINE BESYLATE 2.5 MG/1
1 TABLET ORAL
Qty: 30 | Refills: 0
Start: 2021-07-10 | End: 2021-08-08

## 2021-07-10 RX ORDER — PANTOPRAZOLE SODIUM 20 MG/1
1 TABLET, DELAYED RELEASE ORAL
Qty: 0 | Refills: 0 | DISCHARGE

## 2021-07-10 RX ORDER — SUCRALFATE 1 G
10 TABLET ORAL
Qty: 3200 | Refills: 0
Start: 2021-07-10 | End: 2021-09-27

## 2021-07-10 RX ORDER — PANTOPRAZOLE SODIUM 20 MG/1
1 TABLET, DELAYED RELEASE ORAL
Qty: 120 | Refills: 0
Start: 2021-07-10 | End: 2021-09-07

## 2021-07-10 RX ADMIN — ALBUTEROL 2.5 MILLIGRAM(S): 90 AEROSOL, METERED ORAL at 14:29

## 2021-07-10 RX ADMIN — PANTOPRAZOLE SODIUM 40 MILLIGRAM(S): 20 TABLET, DELAYED RELEASE ORAL at 18:31

## 2021-07-10 RX ADMIN — Medication 1 GRAM(S): at 00:33

## 2021-07-10 RX ADMIN — Medication 1 GRAM(S): at 11:57

## 2021-07-10 RX ADMIN — MEGESTROL ACETATE 80 MILLIGRAM(S): 40 SUSPENSION ORAL at 18:31

## 2021-07-10 RX ADMIN — ALBUTEROL 2.5 MILLIGRAM(S): 90 AEROSOL, METERED ORAL at 20:23

## 2021-07-10 RX ADMIN — GABAPENTIN 100 MILLIGRAM(S): 400 CAPSULE ORAL at 06:05

## 2021-07-10 RX ADMIN — Medication 2000 UNIT(S): at 11:56

## 2021-07-10 RX ADMIN — Medication 50 MILLIGRAM(S): at 21:11

## 2021-07-10 RX ADMIN — AMLODIPINE BESYLATE 2.5 MILLIGRAM(S): 2.5 TABLET ORAL at 06:06

## 2021-07-10 RX ADMIN — Medication 1 GRAM(S): at 06:06

## 2021-07-10 RX ADMIN — Medication 40 MILLIGRAM(S): at 06:06

## 2021-07-10 RX ADMIN — Medication 1 PATCH: at 20:34

## 2021-07-10 RX ADMIN — Medication 1 GRAM(S): at 18:31

## 2021-07-10 RX ADMIN — GABAPENTIN 100 MILLIGRAM(S): 400 CAPSULE ORAL at 18:31

## 2021-07-10 RX ADMIN — GABAPENTIN 100 MILLIGRAM(S): 400 CAPSULE ORAL at 00:35

## 2021-07-10 RX ADMIN — PANTOPRAZOLE SODIUM 40 MILLIGRAM(S): 20 TABLET, DELAYED RELEASE ORAL at 06:05

## 2021-07-10 RX ADMIN — SIMVASTATIN 20 MILLIGRAM(S): 20 TABLET, FILM COATED ORAL at 21:11

## 2021-07-10 RX ADMIN — CLOPIDOGREL BISULFATE 75 MILLIGRAM(S): 75 TABLET, FILM COATED ORAL at 11:57

## 2021-07-10 RX ADMIN — PREGABALIN 1000 MICROGRAM(S): 225 CAPSULE ORAL at 11:56

## 2021-07-10 RX ADMIN — Medication 37.5 MICROGRAM(S): at 21:11

## 2021-07-10 RX ADMIN — GABAPENTIN 100 MILLIGRAM(S): 400 CAPSULE ORAL at 11:56

## 2021-07-10 RX ADMIN — MEGESTROL ACETATE 80 MILLIGRAM(S): 40 SUSPENSION ORAL at 06:05

## 2021-07-10 RX ADMIN — Medication 100 MILLIGRAM(S): at 18:31

## 2021-07-10 RX ADMIN — Medication 1 MILLIGRAM(S): at 11:56

## 2021-07-10 RX ADMIN — ALBUTEROL 2.5 MILLIGRAM(S): 90 AEROSOL, METERED ORAL at 08:03

## 2021-07-10 RX ADMIN — Medication 100 MILLIGRAM(S): at 06:06

## 2021-07-10 RX ADMIN — ENOXAPARIN SODIUM 40 MILLIGRAM(S): 100 INJECTION SUBCUTANEOUS at 11:57

## 2021-07-10 NOTE — PROGRESS NOTE ADULT - SUBJECTIVE AND OBJECTIVE BOX
Pt seen and examined. Status post EGD yesterday which showed erosive gastritis and distal esophagitis w/o evidence of ulcer disease or gastric outlet obstruction. Pt. tolerating CLD well since EGD yesterday. On Pantoprazole 40 mg. BID and liquid Sucralfate 1 gram PO QID to Rx the above esophagitis and gastritis. Pt w/o c/o abdominal pain or N/V this AM.     REVIEW OF SYSTEMS:  Constitutional: No fever, weight loss or fatigue  Cardiovascular: No chest pain, palpitations, dizziness or leg swelling  Gastrointestinal: As noted above. No abdominal or epigastric pain. No nausea, vomiting or hematemesis; No diarrhea or constipation. No melena or hematochezia.  Skin: No itching, burning, rashes or lesions       MEDICATIONS:  MEDICATIONS  (STANDING):  ALBUTerol    0.083% 2.5 milliGRAM(s) Nebulizer every 6 hours  amitriptyline 50 milliGRAM(s) Oral at bedtime  amLODIPine   Tablet 2.5 milliGRAM(s) Oral daily  cholecalciferol 2000 Unit(s) Oral daily  cloNIDine Patch 0.2 mG/24Hr(s) 1 patch Transdermal every 7 days  clopidogrel Tablet 75 milliGRAM(s) Oral daily  cyanocobalamin 1000 MICROGram(s) Oral daily  enoxaparin Injectable 40 milliGRAM(s) SubCutaneous daily  folic acid 1 milliGRAM(s) Oral daily  furosemide    Tablet 40 milliGRAM(s) Oral daily  gabapentin 100 milliGRAM(s) Oral four times a day  lactated ringers. 1000 milliLiter(s) (60 mL/Hr) IV Continuous <Continuous>  levothyroxine Injectable 37.5 MICROGram(s) IV Push at bedtime  megestrol 80 milliGRAM(s) Oral two times a day  metoprolol tartrate 100 milliGRAM(s) Oral two times a day  pantoprazole  Injectable 40 milliGRAM(s) IV Push every 12 hours  senna 2 Tablet(s) Oral at bedtime  simvastatin 20 milliGRAM(s) Oral at bedtime  sucralfate suspension 1 Gram(s) Oral four times a day    MEDICATIONS  (PRN):  acetaminophen   Tablet .. 975 milliGRAM(s) Oral every 6 hours PRN Mild Pain (1 - 3), Moderate Pain (4 - 6)  labetalol Injectable 10 milliGRAM(s) IV Push every 8 hours PRN If systolic BP is >160      Allergies    penicillin (Angioedema; Rash; Urticaria)    Intolerances        Vital Signs Last 24 Hrs  T(C): 36.8 (10 Jul 2021 05:00), Max: 37 (09 Jul 2021 17:20)  T(F): 98.3 (10 Jul 2021 05:00), Max: 98.6 (09 Jul 2021 17:20)  HR: 99 (10 Jul 2021 08:03) (64 - 105)  BP: 159/91 (10 Jul 2021 05:00) (143/85 - 162/92)  BP(mean): --  RR: 18 (10 Jul 2021 05:00) (18 - 18)  SpO2: 99% (10 Jul 2021 08:03) (95% - 100%)    07-09 @ 07:01  -  07-10 @ 07:00  --------------------------------------------------------  IN: 720 mL / OUT: 600 mL / NET: 120 mL        PHYSICAL EXAM:    General: Well developed; obese; in no acute distress  HEENT: MMM, conjunctiva pink and sclera anicteric.  Lungs: clear to auscultation bilaterally.  Cor: RRR S1, S2 only.  Gastrointestinal: Abdomen: Soft, obese, non-tender non-distended; Normal bowel sounds; No hepatosplenomegaly  Extremities: no cyanosis, clubbing or edema.  Skin: Warm and dry. No obvious rash  Neuro: Pt. a + o x 3.    LABS:  CBC Full  -  ( 09 Jul 2021 05:45 )  WBC Count : 12.34 K/uL  RBC Count : 3.41 M/uL  Hemoglobin : 9.5 g/dL  Hematocrit : 30.3 %  Platelet Count - Automated : 395 K/uL  Mean Cell Volume : 88.9 fl  Mean Cell Hemoglobin : 27.9 pg  Mean Cell Hemoglobin Concentration : 31.4 gm/dL  Auto Neutrophil # : 9.53 K/uL  Auto Lymphocyte # : 1.41 K/uL  Auto Monocyte # : 0.65 K/uL  Auto Eosinophil # : 0.21 K/uL  Auto Basophil # : 0.00 K/uL  Auto Neutrophil % : 76.3 %  Auto Lymphocyte % : 11.4 %  Auto Monocyte % : 5.3 %  Auto Eosinophil % : 1.7 %  Auto Basophil % : 0.0 %    07-09    137  |  102  |  7.8<L>  ----------------------------<  88  4.5   |  18.0<L>  |  0.52    Ca    8.6      09 Jul 2021 05:45  Phos  2.6     07-09  Mg     2.2     07-09                        RADIOLOGY & ADDITIONAL STUDIES (The following images were personally reviewed):

## 2021-07-10 NOTE — DISCHARGE NOTE PROVIDER - NSDCCPCAREPLAN_GEN_ALL_CORE_FT
PRINCIPAL DISCHARGE DIAGNOSIS  Diagnosis: Abdominal pain, unspecified abdominal location  Assessment and Plan of Treatment: .Follow up: Please call and make an appointment with the Dr. Dougherty  10-14 days after discharge. Also, please call and make an appointment with your primary care physician as per your usual schedule.   Activity: May return to normal activities as tolerated, however refrain from heavy lifting > 10-15 lbs.  Diet: May continue regular diet.  Medications: Please take all medications listed on your discharge paperwork as prescribed.   Wound Care: Please, keep wound site clean and dry. You may shower, but do not bathe.  Patient is advised to RETURN TO THE EMERGENCY DEPARTMENT for any of the following - worsening pain, fever/chills, nausea/vomiting, altered mental status, chest pain, shortness of breath, or any other new / worsening symptom.      SECONDARY DISCHARGE DIAGNOSES  Diagnosis: Tachycardia  Assessment and Plan of Treatment:

## 2021-07-10 NOTE — PROGRESS NOTE ADULT - ATTENDING COMMENTS
EGD performed yesterday. GI recs appreciated. Pt reports "feeling much better" and less bloated after PPI therapy initiated. Tolerating diet  Abdomen soft, nontender  Labs reviewed   Continue all home meds  Discharge planning  Recommend follow-up with me in office and close follow-up with PCP

## 2021-07-10 NOTE — DISCHARGE NOTE PROVIDER - HOSPITAL COURSE
71F  w/ PMH of HTN, AF, GERD, CHF, s/p laparoscopic cholecystectomy she was discharged home and returned to the ED with sepsis, she was admitted to the SICU, CT abd and pelvis was obtained a small fluid collection in the gallbladder fossa, she was take to the OR for a diagnostic laparoscopy which did not show evidence of any significant pathology.  Post operatively patient was nauseous and she had ileus, NGT was placed,     71F  w/ PMH of HTN, AF, GERD, CHF, s/p laparoscopic cholecystectomy she was discharged home and returned to the ED with sepsis, she was admitted to the SICU, CT abd and pelvis was obtained a small fluid collection in the gallbladder fossa, she was take to the OR for a diagnostic laparoscopy which did not show evidence of any significant pathology. Given tachycardia and shortness of breath a CTA was done on 7/3 without evidence of PE. After cardiopulmonary optimization the patient was downgraded from ICU to the surgical floor.   Patient progressed well tolerating diet and having BM, however, she developed an ileus that was managed with NGT decompression and bowel rest. Given multiple comorbidities medicine was consulted and recommendations were followed an patient was restarted on home medications. Patient's ileus resolved and diet was slowly advanced with good PO tolerance. Given persistent abdominal pain EGD was performed by GI identifying erosive gastritis and esophagitis and the patient was started on BID PPI with significant improvement of symptoms. Patient is now tolerating diet, voiding, and having bowel function, pain is resolved and would like to go home.

## 2021-07-10 NOTE — DISCHARGE NOTE PROVIDER - NSDCMRMEDTOKEN_GEN_ALL_CORE_FT
albuterol sulfate 2.5 mg/3 mL (0.083 %) solution for nebulization:   amitriptyline 50 mg oral tablet: 1 tab(s) orally once a day (at bedtime)  clonidine HCl 0.2 mg tablet:   clopidogrel 75 mg tablet: 1 tab(s) orally once a day  docusate sodium 100 mg oral tablet: 2 tab(s) orally once a day (at bedtime), As Needed  ezetimibe 10 mg tablet: 1 tab(s) orally once a day  folic acid 1 mg oral tablet: 1 tab(s) orally once a day  furosemide 40 mg tablet: 1 tab(s) orally once a day  gabapentin 100 mg capsule: 1 cap(s) orally 4 times a day  Home Health to aide with recovery post operatively :   levothyroxine 75 mcg tablet: 1 tab(s) orally once a day  megestrol 40 mg tablet: 2 tab(s) orally 2 times a day  metoprolol succinate  mg tablet,extended release 24 hr:   pantoprazole 40 mg tablet,delayed release: 1 tab(s) orally once a day  Physical therapy, 3 times a week until cleared.  :   potassium chloride ER 10 mEq capsule,extended release: 2 cap(s) orally once a day  Senna 8.6 mg oral tablet: 1 tab(s) orally 2 times a day  simvastatin 20 mg oral tablet: 1 tab(s) orally once a day (at bedtime)  Tylenol 325 mg oral tablet: 2 tab(s) orally every 6 hours  Vitamin B12 1000 mcg oral tablet: 1 tab(s) orally once a day  Vitamin D3 2000 intl units (50 mcg) oral capsule: orally once a day   albuterol sulfate 2.5 mg/3 mL (0.083 %) solution for nebulization:   amitriptyline 50 mg oral tablet: 1 tab(s) orally once a day (at bedtime)  amLODIPine 2.5 mg oral tablet: 1 tab(s) orally once a day  clonidine HCl 0.2 mg tablet:   clopidogrel 75 mg tablet: 1 tab(s) orally once a day  docusate sodium 100 mg oral tablet: 2 tab(s) orally once a day (at bedtime), As Needed  ezetimibe 10 mg tablet: 1 tab(s) orally once a day  folic acid 1 mg oral tablet: 1 tab(s) orally once a day  furosemide 40 mg tablet: 1 tab(s) orally once a day  gabapentin 100 mg capsule: 1 cap(s) orally 4 times a day  Home Health to aide with recovery post operatively :   levothyroxine 75 mcg tablet: 1 tab(s) orally once a day  megestrol 40 mg tablet: 2 tab(s) orally 2 times a day  metoprolol succinate  mg tablet,extended release 24 hr:   Physical therapy, 3 times a week until cleared.  :   potassium chloride ER 10 mEq capsule,extended release: 2 cap(s) orally once a day  Protonix 40 mg oral granule, delayed release: 1 each orally 2 times a day   Senna 8.6 mg oral tablet: 1 tab(s) orally 2 times a day  simvastatin 20 mg oral tablet: 1 tab(s) orally once a day (at bedtime)  sucralfate 1 g/10 mL oral suspension: 10 milliliter(s) orally 4 times a day  Tylenol 325 mg oral tablet: 2 tab(s) orally every 6 hours  Vitamin B12 1000 mcg oral tablet: 1 tab(s) orally once a day  Vitamin D3 2000 intl units (50 mcg) oral capsule: orally once a day

## 2021-07-10 NOTE — PROGRESS NOTE ADULT - PROVIDER SPECIALTY LIST ADULT
SICU
Surgery
SICU
Surgery
Surgery
Hospitalist
Hospitalist
Surgery
Surgery
Gastroenterology
Surgery

## 2021-07-10 NOTE — PROGRESS NOTE ADULT - SUBJECTIVE AND OBJECTIVE BOX
SUBJECTIVE: No acute complaints overnight    Vital Signs Last 24 Hrs  T(C): 36.7 (10 Jul 2021 09:43), Max: 37 (09 Jul 2021 17:20)  T(F): 98 (10 Jul 2021 09:43), Max: 98.6 (09 Jul 2021 17:20)  HR: 105 (10 Jul 2021 09:43) (64 - 105)  BP: 117/80 (10 Jul 2021 09:43) (117/80 - 162/92)  BP(mean): --  RR: 18 (10 Jul 2021 09:43) (18 - 18)  SpO2: 98% (10 Jul 2021 09:43) (95% - 100%)    PHYSICAL EXAM:  Constitutional: Patient well nourish. well developed.  Respiratory:  Nonlabored on RA  Cardiovascular: RRR  Gastrointestinal: Abdomen soft, minimal distention  Extremities:  No edema, no calf tenderrness,    I&O's Summary    09 Jul 2021 07:01  -  10 Jul 2021 07:00  --------------------------------------------------------  IN: 720 mL / OUT: 600 mL / NET: 120 mL    10 Jul 2021 07:01  -  10 Jul 2021 10:18  --------------------------------------------------------  IN: 380 mL / OUT: 400 mL / NET: -20 mL      I&O's Detail    09 Jul 2021 07:01  -  10 Jul 2021 07:00  --------------------------------------------------------  IN:    Lactated Ringers: 720 mL  Total IN: 720 mL    OUT:    Voided (mL): 600 mL  Total OUT: 600 mL    Total NET: 120 mL      10 Jul 2021 07:01  -  10 Jul 2021 10:18  --------------------------------------------------------  IN:    Oral Fluid: 380 mL  Total IN: 380 mL    OUT:    Voided (mL): 400 mL  Total OUT: 400 mL    Total NET: -20 mL    MEDICATIONS  (STANDING):  ALBUTerol    0.083% 2.5 milliGRAM(s) Nebulizer every 6 hours  amitriptyline 50 milliGRAM(s) Oral at bedtime  amLODIPine   Tablet 2.5 milliGRAM(s) Oral daily  cholecalciferol 2000 Unit(s) Oral daily  cloNIDine Patch 0.2 mG/24Hr(s) 1 patch Transdermal every 7 days  clopidogrel Tablet 75 milliGRAM(s) Oral daily  cyanocobalamin 1000 MICROGram(s) Oral daily  enoxaparin Injectable 40 milliGRAM(s) SubCutaneous daily  folic acid 1 milliGRAM(s) Oral daily  furosemide    Tablet 40 milliGRAM(s) Oral daily  gabapentin 100 milliGRAM(s) Oral four times a day  lactated ringers. 1000 milliLiter(s) (60 mL/Hr) IV Continuous <Continuous>  levothyroxine Injectable 37.5 MICROGram(s) IV Push at bedtime  megestrol 80 milliGRAM(s) Oral two times a day  metoprolol tartrate 100 milliGRAM(s) Oral two times a day  pantoprazole  Injectable 40 milliGRAM(s) IV Push every 12 hours  senna 2 Tablet(s) Oral at bedtime  simvastatin 20 milliGRAM(s) Oral at bedtime  sucralfate suspension 1 Gram(s) Oral four times a day    MEDICATIONS  (PRN):  acetaminophen   Tablet .. 975 milliGRAM(s) Oral every 6 hours PRN Mild Pain (1 - 3), Moderate Pain (4 - 6)  labetalol Injectable 10 milliGRAM(s) IV Push every 8 hours PRN If systolic BP is >160      LABS:                        9.5    12.34 )-----------( 395      ( 09 Jul 2021 05:45 )             30.3     07-09    137  |  102  |  7.8<L>  ----------------------------<  88  4.5   |  18.0<L>  |  0.52    Ca    8.6      09 Jul 2021 05:45  Phos  2.6     07-09  Mg     2.2     07-09            RADIOLOGY & ADDITIONAL STUDIES:

## 2021-07-10 NOTE — PROGRESS NOTE ADULT - NSICDXPILOT_GEN_ALL_CORE
[place]

## 2021-07-10 NOTE — DISCHARGE NOTE PROVIDER - CARE PROVIDER_API CALL
Zenon Dougherty)  Surgery  Bariatric  [address] [place] [id number]  Phone: [phone]  Fax: [phone]  Established Patient  Follow Up Time: 2 weeks

## 2021-07-10 NOTE — DISCHARGE NOTE PROVIDER - NSDCFUSCHEDAPPT_GEN_ALL_CORE_FT
TOBY HURT ; 08/12/2021 ; JENNIFER [facility] [address] [facility]  TOBY HURT ; 10/07/2021 ; JENNIFER [facility] [address] [facility]

## 2021-07-10 NOTE — PROGRESS NOTE ADULT - ASSESSMENT
Assessment and Plan:   · Assessment	  71F known history of cholecystitis, s/p percutaneous cholecystostomy and interval cholecystectomy, presented POD#0 with abdominal pain and respiratory distress, was taken for a laparoscopy diagnostic which was negative.  Admitted to the SICU post op for resuscitation.  Patient did well post op, currently POD #7 from the index procedure and POD#6 from the diagnostic laparoscopy.  Patient developed ileus and NGT was placed with 500 cc output with symptomatic improvement. She has been doing well clinically.     - Pulm - Pulm toilet, ISS  - Continue on IV PPI BID  - PT and OOB  - DVT ppx

## 2021-07-10 NOTE — PROGRESS NOTE ADULT - ASSESSMENT
Erosive gastritis and esophagitis w/o ulcerations or GOO. Further diet advancement as per ACS but pt. appears ready for diet advancement from a GI standpoint. Would continue current Pantoprazole Rx which can be converted to PO 40 mg. BID and liquid Sucralfate QID here and as OPT. OPT f/u in our [id number] in 6 to 8 weeks. No plans or need for continued GI f/u. Signing off. Reconsult as needed. Thank you.

## 2021-07-12 DIAGNOSIS — Z71.89 OTHER SPECIFIED COUNSELING: ICD-10-CM

## 2021-07-15 ENCOUNTER — TRANSCRIPTION ENCOUNTER (OUTPATIENT)
Age: 71
End: 2021-07-15

## 2021-07-15 LAB — SURGICAL PATHOLOGY STUDY: SIGNIFICANT CHANGE UP

## 2021-07-22 ENCOUNTER — APPOINTMENT (OUTPATIENT)
Dept: SURGERY | Facility: CLINIC | Age: 71
End: 2021-07-22
Payer: MEDICARE

## 2021-07-22 VITALS
HEART RATE: 93 BPM | HEIGHT: 60 IN | DIASTOLIC BLOOD PRESSURE: 75 MMHG | RESPIRATION RATE: 16 BRPM | TEMPERATURE: 97.2 F | SYSTOLIC BLOOD PRESSURE: 107 MMHG | OXYGEN SATURATION: 97 %

## 2021-07-22 PROCEDURE — 99024 POSTOP FOLLOW-UP VISIT: CPT

## 2021-07-26 NOTE — HISTORY OF PRESENT ILLNESS
[de-identified] : Ms. HURT is a 71 year old woman with admission in December 2020 to [place] for cholecystitis s/p cholecystostomy tube placement with tube dislodgement with recurrent cholecystitis s/p admission to [facility] and replacement of cholecystostomy tube on 4/10/21 s/p robotic cholecystectomy on 7/2/21 who presents today for followup. Pt was readmitted to [facility] several hours after discharge on 7/2/21 with pain, tachycardia, and laboratory abnormalities concerning for intraabdominal sepsis s/p negative diagnostic laparoscopy on 7/3/21. No source of infection was identified and patient improved with supportive care. Pt was also seen in [place] ED last week with complaints of shortness of breath - workup was negative and she was discharged home. Today, she complains of shortness of breath, which is improved from her recent ED visit, but persistent. \par \par Pt denies abdominal pain. Denies fever/chills/nausea/emesis. Tolerating diet.

## 2021-07-26 NOTE — PLAN
[FreeTextEntry1] : Recommend close followup with pulmonologist \par Return to office / followup with televisit in 1 month

## 2021-07-26 NOTE — ASSESSMENT
[FreeTextEntry1] : Ms. HURT is a 71 year old woman with admission in December 2020 to [place] for cholecystitis s/p cholecystostomy tube placement with tube dislodgement with recurrent cholecystitis s/p admission to [facility] and replacement of cholecystostomy tube on 4/10/21 s/p robotic cholecystectomy on 7/2/21, here for followup. Pt was readmitted to [facility] postoperatively with concern for intraabdominal sepsis s/p negative diagnostic laparoscopy - no source of infection was identified and patient improved with supportive care. \par \par Pt was also seen in [place] ED last week with complaints of shortness of breath - workup was negative and she was discharged home. Today, she complains of shortness of breath, which is improved from her recent ED visit, but persistent.

## 2021-07-26 NOTE — PHYSICAL EXAM
[No Rash or Lesion] : No rash or lesion [Alert] : alert [Oriented to Person] : oriented to person [Oriented to Place] : oriented to place [Oriented to Time] : oriented to time [Calm] : calm [JVD] : no jugular venous distention  [Normal Breath Sounds] : Normal breath sounds [de-identified] : No acute distress [de-identified] : No respiratory distress, lungs CTA bilaterally [de-identified] : Regular rate [de-identified] : soft, nontender. no rebound or guarding. incisions healing well [de-identified] : wheelchair bound

## 2021-08-17 ENCOUNTER — APPOINTMENT (OUTPATIENT)
Dept: GYNECOLOGIC ONCOLOGY | Facility: CLINIC | Age: 71
End: 2021-08-17
Payer: MEDICARE

## 2021-08-17 VITALS
HEIGHT: 60 IN | SYSTOLIC BLOOD PRESSURE: 148 MMHG | OXYGEN SATURATION: 100 % | BODY MASS INDEX: 43.19 KG/M2 | DIASTOLIC BLOOD PRESSURE: 93 MMHG | HEART RATE: 104 BPM | WEIGHT: 220 LBS

## 2021-08-17 PROCEDURE — 58100 BIOPSY OF UTERUS LINING: CPT | Mod: 59

## 2021-08-17 PROCEDURE — 99214 OFFICE O/P EST MOD 30 MIN: CPT | Mod: 25

## 2021-08-18 NOTE — PROCEDURE
[Endometrial Biopsy] : an endometrial biopsy [Postmenopausal Bleeding] : postmenopausal bleeding [Patient] : the patient [Verbal Consent] : verbal consent was obtained prior to the procedure and is detailed in the patient's record [Yes] : the specimen was sent to pathology [No Complications] : none [Tolerated Well] : the patient tolerated the procedure well [0] : 0 [FreeTextEntry1] : Uterus sounded to 6 cm. Pipelle passed 3 times with blood clot/tissue obtained.

## 2021-08-18 NOTE — END OF VISIT
[FreeTextEntry3] : Follow up for telethealth results on EMB [FreeTextEntry2] : Luann Ross MA was present the entire duration of the patient interaction and gynecological exam.\par

## 2021-08-18 NOTE — PHYSICAL EXAM
[Abnormal] : Cervix: Abnormal [Normal] : Anus and perineum: Normal sphincter tone, no masses, no prolapse. [Completely disabled. Cannot carry on any self care. Totally confined to bed or chair] : Status 4- Completely disabled. Cannot carry on any self care. Totally confined to bed or chair [de-identified] : + crusty deposits on external genitalia, possibly dried blood or yeast infection, erythema of external genitalia  [de-identified] : blood clots coming out of cervix, dark red

## 2021-08-18 NOTE — DISCUSSION/SUMMARY
[FreeTextEntry1] : We discussed that given her anticoagulation on plavix she is likely going to bleed more given any type of bleeding. We need to follow up on endometrial biopsy and we will restart the Megace 40 mg PO BID. The patient would like to avoid any surgery as her heart decompensated during the cholecystectomy. The patient would prefer medical management of her bleeding if its working. We reviewed her pathology from 1/21/21 and repeated biopsy today. Results will be reviewed at the next telehealth visit.

## 2021-08-19 ENCOUNTER — APPOINTMENT (OUTPATIENT)
Dept: SURGERY | Facility: CLINIC | Age: 71
End: 2021-08-19
Payer: MEDICARE

## 2021-08-19 PROCEDURE — 99024 POSTOP FOLLOW-UP VISIT: CPT

## 2021-08-19 NOTE — HISTORY OF PRESENT ILLNESS
[Home] : at home, [unfilled] , at the time of the visit. [Medical Office: ([facility])___] : at the medical office located in  [Verbal consent obtained from patient] : the patient, [unfilled] [de-identified] : Ms. HURT is a 71 year old woman with admission in December 2020 to [place] for cholecystitis s/p cholecystostomy tube placement with tube dislodgement with recurrent cholecystitis s/p admission to [facility] and replacement of cholecystostomy tube on 4/10/21 s/p robotic cholecystectomy on 7/2/21, last seen in office on 7/22/21 who requested telephone visit for follow-up today. Since last visit, pt reports that shortness of breath has resolved. She denies pain. Pt denies abdominal pain. Denies fever/chills/nausea/emesis. Tolerating diet. \par \par Pt had EGD-confirmed gastritis and was discharged on PPI and sulcralfate. Pt reports that she has not taken Sulcralfate in at least 2 weeks but has been taking protonix BID and reports good compliance. Denies heartburn, denies epigastric pain.

## 2021-08-19 NOTE — PLAN
[FreeTextEntry1] : Continue PPI BID\par Follow-up televisit in 4 weeks. Will decrease to PPI daily at that time

## 2021-08-19 NOTE — ASSESSMENT
[FreeTextEntry1] : Ms. HURT is a 71 year old woman with admission in December 2020 to [place] for cholecystitis s/p cholecystostomy tube placement with tube dislodgement with recurrent cholecystitis s/p admission to [facility] and replacement of cholecystostomy tube on 4/10/21 s/p robotic cholecystectomy on 7/2/21. Shortness of breath present at last visit has resolved. Gastritis now well controlled with protonix.

## 2021-08-20 LAB — CORE LAB BIOPSY: NORMAL

## 2021-08-27 ENCOUNTER — APPOINTMENT (OUTPATIENT)
Dept: GYNECOLOGIC ONCOLOGY | Facility: CLINIC | Age: 71
End: 2021-08-27
Payer: MEDICARE

## 2021-08-27 PROCEDURE — 99441: CPT

## 2021-08-27 NOTE — ASSESSMENT
[FreeTextEntry1] : Pt is a 72 yo with PMB on Megace empyrically and is a poor surgical candidate. Endometrial biopsy in office on 8/17 with progesterone effect and no evidence of atypia or malignancy. Continue current management and if no bleeding repeat biopsy in 1 year otherwise sooner. We discussed possibility of hyperplasia in adenomyosis that could explain the bleeding or alternatively a blood vessel rupturing due to high blood pressure. She is managing her blood pressure at home well.

## 2021-08-27 NOTE — END OF VISIT
[FreeTextEntry3] : Follow up in 1 year or sooner if she has more bleeding [Time Spent: ___ minutes] : I have spent [unfilled] minutes of time on the encounter.

## 2021-08-27 NOTE — HISTORY OF PRESENT ILLNESS
[Home] : at home, [unfilled] , at the time of the visit. [Other Location: e.g. Home (Enter Location, City,State)___] : at [unfilled] [FreeTextEntry1] : Pt is a 72 yo with PMB with negative biopsy on empyric treatment with megace 40 mg PO BID. She is doing very well and having no further bleeding. She would like to avoid surgery at all cost as she had robotic cholecystectomy and had decompensation of her hear requiring hospitalization for 1 week. She presents for follow up and further planning.

## 2021-09-16 ENCOUNTER — APPOINTMENT (OUTPATIENT)
Dept: SURGERY | Facility: CLINIC | Age: 71
End: 2021-09-16
Payer: MEDICARE

## 2021-09-16 PROCEDURE — 99024 POSTOP FOLLOW-UP VISIT: CPT

## 2021-09-16 NOTE — ASSESSMENT
[FreeTextEntry1] : Ms. HURT is a 71 year old woman with admission in December 2020 to [place] for cholecystitis s/p cholecystostomy tube placement with tube dislodgement with recurrent cholecystitis s/p admission to [facility] and replacement of cholecystostomy tube on 4/10/21 s/p robotic cholecystectomy on 7/2/21. Gastritis now well-controlled with protonix.

## 2021-09-16 NOTE — HISTORY OF PRESENT ILLNESS
[de-identified] : Ms. HURT is a 71 year old woman with admission in December 2020 to [place] for cholecystitis s/p cholecystostomy tube placement with tube dislodgement with recurrent cholecystitis s/p admission to [facility] and replacement of cholecystostomy tube on 4/10/21 s/p robotic cholecystectomy on 7/2/21, last seen in office on 7/22/21 with televisit on 8/19/21 who requested telephone visit for follow-up today. Pt reports that she is tolerating diet and denies abdominal pain, nausea/emesis, or other abdominal complaints. She denies shortness of breath. Pt reports that she is taking pantoprazole twice daily. \par \par Pt had EGD-confirmed gastritis and was discharged on PPI and sulcralfate. Pt reports that she has not taken Sulcralfate in at least 2 weeks but has been taking protonix BID and reports good compliance. Denies heartburn, denies epigastric pain.

## 2021-10-07 ENCOUNTER — APPOINTMENT (OUTPATIENT)
Dept: SURGERY | Facility: CLINIC | Age: 71
End: 2021-10-07
Payer: MEDICARE

## 2021-10-07 ENCOUNTER — APPOINTMENT (OUTPATIENT)
Dept: SURGERY | Facility: CLINIC | Age: 71
End: 2021-10-07

## 2021-10-07 PROCEDURE — 99213 OFFICE O/P EST LOW 20 MIN: CPT | Mod: 95

## 2021-10-08 NOTE — PLAN
[FreeTextEntry1] : Stop protonix therapy, with instructions to restart as needed\par Televisit followup in 1 month

## 2021-10-08 NOTE — HISTORY OF PRESENT ILLNESS
[Home] : at home, [unfilled] , at the time of the visit. [Medical Office: ([facility])___] : at the medical office located in  [Verbal consent obtained from patient] : the patient, [unfilled] [de-identified] : Ms. HURT is a 71 year old woman with admission in December 2020 to [place] for cholecystitis s/p cholecystostomy tube placement with tube dislodgement with recurrent cholecystitis s/p admission to [facility] and replacement of cholecystostomy tube on 4/10/21 s/p robotic cholecystectomy on 7/2/21, last seen in office on 7/22/21 who requests telephone visit for follow-up today. Pt reports that she is tolerating diet and denies abdominal pain, nausea/emesis, or other abdominal complaints. She denies shortness of breath. Pt reports that she is taking pantoprazole once daily. \par \par Pt had EGD-confirmed gastritis and was discharged on PPI and sulcralfate. At last televisit one month ago, pt was decreased from PPI 2x per day to once daily. Sucralfate had been stopped previously. Denies heartburn, denies epigastric pain.

## 2021-10-08 NOTE — ASSESSMENT
[FreeTextEntry1] : Ms. BRUNO is a 71 year old woman with admission in December 2020 to [place] for cholecystitis s/p cholecystostomy tube placement with tube dislodgement with recurrent cholecystitis s/p admission to [facility] and replacement of cholecystostomy tube on 4/10/21 s/p robotic cholecystectomy on 7/2/21. Gastritis now well-controlled/resolved. We discussed the option of stopping PPI treatment. \par \par We will plan to have Ms. Bruno stop protonix. If she experiences epigastric pain, heartburn, bloating, or any other complaints, she will restart protonix and contact me immediately.

## 2021-10-28 ENCOUNTER — APPOINTMENT (OUTPATIENT)
Dept: SURGERY | Facility: CLINIC | Age: 71
End: 2021-10-28
Payer: MEDICARE

## 2021-10-28 PROCEDURE — 99213 OFFICE O/P EST LOW 20 MIN: CPT

## 2021-10-28 NOTE — ASSESSMENT
[FreeTextEntry1] : Ms. BRUNO is a 71 year old woman with admission in December 2020 to [place] for cholecystitis s/p cholecystostomy tube placement with tube dislodgement with recurrent cholecystitis s/p admission to [facility] and replacement of cholecystostomy tube on 4/10/21 s/p robotic cholecystectomy on 7/2/21. Gastritis now well-controlled/resolved. Ms. Bruno now doing well off protonix. \par \par We will plan for Ms. Bruno to not resume protonix unless symptoms return. If she experiences epigastric pain, heartburn, bloating, or any other complaints, she will restart protonix and contact me immediately.

## 2021-10-28 NOTE — HISTORY OF PRESENT ILLNESS
[Home] : at home, [unfilled] , at the time of the visit. [Medical Office: ([facility])___] : at the medical office located in  [Verbal consent obtained from patient] : the patient, [unfilled] [de-identified] : Ms. HURT is a 71 year old woman with admission in December 2020 to [place] for cholecystitis s/p cholecystostomy tube placement with tube dislodgement with recurrent cholecystitis s/p admission to [facility] and replacement of cholecystostomy tube on 4/10/21 s/p robotic cholecystectomy on 7/2/21, last seen in office on 7/22/21 who requests telephone visit for follow-up today. Pt reports that she is tolerating diet and denies abdominal pain, nausea/emesis, or other abdominal complaints. She denies shortness of breath. Pt reports that she has stopped taking pantoprazole since last visit. Denies heartburn or any other symptoms. \par \par Pt had EGD-confirmed gastritis and was discharged on PPI and sulcralfate. At last televisit one month ago, pt was decreased from PPI 2x per day to once daily. Sucralfate had been stopped previously. Denies heartburn, denies epigastric pain.

## 2021-11-04 ENCOUNTER — APPOINTMENT (OUTPATIENT)
Dept: SURGERY | Facility: CLINIC | Age: 71
End: 2021-11-04

## 2021-12-01 PROCEDURE — G9005: CPT

## 2021-12-06 ENCOUNTER — RX RENEWAL (OUTPATIENT)
Age: 71
End: 2021-12-06

## 2022-02-08 NOTE — ED PROVIDER NOTE - DOMESTIC TRAVEL HIGH RISK QUESTION
History and Physical - [facility] [employer] [facility]    Mark Patel Fenstermacher 78 y o  female MRN: [id number]      HPI: Nancy Lemos is a 78y o  year old female who presents for personal history of colon polyps  REVIEW OF SYSTEMS: Per the HPI, and otherwise unremarkable      Historical Information   Past Medical History:   Diagnosis Date    Anxiety     Arthritis     Asthma     exercise induced    Colon polyp     Diabetes mellitus ([facility] [address] )     Hyperlipidemia     Hypertension     RA (rheumatoid arthritis) ([facility] )      Past Surgical History:   Procedure Laterality Date    COLONOSCOPY      TONSILLECTOMY       Social History   Social History     Substance and Sexual Activity   Alcohol Use Yes    Alcohol/week: 14 0 standard drinks    Types: 14 Glasses of wine per week     Social History     Substance and Sexual Activity   Drug Use No     Social History     Tobacco Use   Smoking Status Former Smoker    Years: 25 00   Smokeless Tobacco Never Used     Family History   Problem Relation Age of Onset    Hypertension Mother     Lung cancer Father     Brain cancer Father     No Known Problems Maternal Grandmother     No Known Problems Maternal Grandfather     No Known Problems Paternal Grandmother     No Known Problems Paternal Grandfather     No Known Problems Maternal Aunt     No Known Problems Paternal Aunt     No Known Problems Paternal Aunt     No Known Problems Paternal Aunt     No Known Problems Paternal Aunt     Substance Abuse Neg Hx     Mental illness Neg Hx        Meds/Allergies       Current Outpatient Medications:     acetaminophen (TYLENOL) 500 mg tablet    ALPRAZolam (XANAX) 1 mg tablet    amLODIPine (NORVASC) 10 mg tablet    aspirin (ECOTRIN LOW STRENGTH) 81 mg EC tablet    calcium carbonate-vitamin D (OSCAL-D) 500 mg-200 units per tablet    Empagliflozin (Jardiance) 10 MG TABS    folic acid (FOLVITE) 1 mg tablet    ibuprofen (MOTRIN) 200 mg tablet   inFLIXimab (REMICADE) 100 mg    LOSARTAN POTASSIUM PO    methotrexate 2 5 mg tablet    pravastatin (PRAVACHOL) 20 mg tablet    Premarin vaginal cream    spironolactone (ALDACTONE) 25 mg tablet    Ventolin  (90 Base) MCG/ACT inhaler    predniSONE 20 mg tablet    triamcinolone (KENALOG) 0 5 % ointment    Current Facility-Administered Medications:     lactated ringers infusion, 50 mL/hr, Intravenous, Continuous, 50 mL/hr at 02/08/22 0836    Allergies   Allergen Reactions    Cephalexin        Objective     /70   Pulse 96   Temp (!) 97 4 °F (36 3 °C) (Temporal)   Resp 22   Wt 89 8 kg (198 lb)   SpO2 96%   BMI 33 99 kg/m²       PHYSICAL EXAM    Gen: NAD AAOx3  Head: Normocephalic, Atraumatic  CV: S1S2 RRR no m/r/g  CHEST: Clear b/l no c/r/w  ABD: soft, +BS NT/ND no masses  EXT: no edema      ASSESSMENT/PLAN:  This is a 78y o  year old female here for colonoscopy, and she is stable and optimized for her procedure  No

## 2022-02-16 NOTE — DISCHARGE NOTE NURSING/CASE MANAGEMENT/SOCIAL WORK - NSDCPETBCESMAN_GEN_ALL_CORE
If you are a smoker, it is important for your health to stop smoking. Please be aware that second hand smoke is also harmful. 9

## 2022-04-25 ENCOUNTER — APPOINTMENT (OUTPATIENT)
Dept: FAMILY MEDICINE | Facility: CLINIC | Age: 72
End: 2022-04-25
Payer: MEDICARE

## 2022-04-25 VITALS
TEMPERATURE: 97.6 F | SYSTOLIC BLOOD PRESSURE: 130 MMHG | OXYGEN SATURATION: 98 % | HEIGHT: 60 IN | HEART RATE: 103 BPM | BODY MASS INDEX: 35.73 KG/M2 | RESPIRATION RATE: 16 BRPM | DIASTOLIC BLOOD PRESSURE: 90 MMHG | WEIGHT: 182 LBS

## 2022-04-25 VITALS — DIASTOLIC BLOOD PRESSURE: 80 MMHG | SYSTOLIC BLOOD PRESSURE: 128 MMHG

## 2022-04-25 DIAGNOSIS — Z13.228 ENCOUNTER FOR SCREENING FOR OTHER SUSPECTED ENDOCRINE DISORDER: ICD-10-CM

## 2022-04-25 DIAGNOSIS — Z13.0 ENCOUNTER FOR SCREENING FOR OTHER SUSPECTED ENDOCRINE DISORDER: ICD-10-CM

## 2022-04-25 DIAGNOSIS — Z12.39 ENCOUNTER FOR OTHER SCREENING FOR MALIGNANT NEOPLASM OF BREAST: ICD-10-CM

## 2022-04-25 DIAGNOSIS — N85.8 OTHER SPECIFIED NONINFLAMMATORY DISORDERS OF UTERUS: ICD-10-CM

## 2022-04-25 DIAGNOSIS — Z76.89 PERSONS ENCOUNTERING HEALTH SERVICES IN OTHER SPECIFIED CIRCUMSTANCES: ICD-10-CM

## 2022-04-25 DIAGNOSIS — Z86.79 PERSONAL HISTORY OF OTHER DISEASES OF THE CIRCULATORY SYSTEM: ICD-10-CM

## 2022-04-25 DIAGNOSIS — Z13.29 ENCOUNTER FOR SCREENING FOR OTHER SUSPECTED ENDOCRINE DISORDER: ICD-10-CM

## 2022-04-25 DIAGNOSIS — Z86.73 PERSONAL HISTORY OF TRANSIENT ISCHEMIC ATTACK (TIA), AND CEREBRAL INFARCTION W/OUT RESIDUAL DEFICITS: ICD-10-CM

## 2022-04-25 DIAGNOSIS — A80.9 ACUTE POLIOMYELITIS, UNSPECIFIED: ICD-10-CM

## 2022-04-25 PROCEDURE — 99204 OFFICE O/P NEW MOD 45 MIN: CPT

## 2022-05-10 LAB
25(OH)D3 SERPL-MCNC: 47.6 NG/ML
ALBUMIN SERPL ELPH-MCNC: 4 G/DL
ALP BLD-CCNC: 122 U/L
ALT SERPL-CCNC: 15 U/L
ANION GAP SERPL CALC-SCNC: 17 MMOL/L
APPEARANCE: CLEAR
AST SERPL-CCNC: 12 U/L
BACTERIA UR CULT: NORMAL
BACTERIA: NEGATIVE
BASOPHILS # BLD AUTO: 0.04 K/UL
BASOPHILS NFR BLD AUTO: 0.4 %
BILIRUB SERPL-MCNC: 0.3 MG/DL
BILIRUBIN URINE: NEGATIVE
BLOOD URINE: NEGATIVE
BUN SERPL-MCNC: 7 MG/DL
CALCIUM SERPL-MCNC: 9.6 MG/DL
CHLORIDE SERPL-SCNC: 101 MMOL/L
CHOLEST SERPL-MCNC: 142 MG/DL
CO2 SERPL-SCNC: 23 MMOL/L
COLOR: YELLOW
CREAT SERPL-MCNC: 0.74 MG/DL
EGFR: 85 ML/MIN/1.73M2
EOSINOPHIL # BLD AUTO: 0.23 K/UL
EOSINOPHIL NFR BLD AUTO: 2.1 %
ESTIMATED AVERAGE GLUCOSE: 131 MG/DL
FERRITIN SERPL-MCNC: 108 NG/ML
FOLATE SERPL-MCNC: 19 NG/ML
GLUCOSE QUALITATIVE U: NORMAL
GLUCOSE SERPL-MCNC: 107 MG/DL
HBA1C MFR BLD HPLC: 6.2 %
HCT VFR BLD CALC: 41.2 %
HDLC SERPL-MCNC: 42 MG/DL
HGB BLD-MCNC: 13.1 G/DL
HYALINE CASTS: 0 /LPF
IMM GRANULOCYTES NFR BLD AUTO: 0.5 %
IRON SATN MFR SERPL: 28 %
IRON SERPL-MCNC: 85 UG/DL
KETONES URINE: NORMAL
LDLC SERPL CALC-MCNC: 63 MG/DL
LEUKOCYTE ESTERASE URINE: NEGATIVE
LYMPHOCYTES # BLD AUTO: 1.96 K/UL
LYMPHOCYTES NFR BLD AUTO: 18.2 %
MAN DIFF?: NORMAL
MCHC RBC-ENTMCNC: 30.2 PG
MCHC RBC-ENTMCNC: 31.8 GM/DL
MCV RBC AUTO: 94.9 FL
MICROSCOPIC-UA: NORMAL
MONOCYTES # BLD AUTO: 0.59 K/UL
MONOCYTES NFR BLD AUTO: 5.5 %
NEUTROPHILS # BLD AUTO: 7.92 K/UL
NEUTROPHILS NFR BLD AUTO: 73.3 %
NITRITE URINE: NEGATIVE
NONHDLC SERPL-MCNC: 100 MG/DL
PH URINE: 6
PLATELET # BLD AUTO: 529 K/UL
POTASSIUM SERPL-SCNC: 3.7 MMOL/L
PROT SERPL-MCNC: 6.9 G/DL
PROTEIN URINE: ABNORMAL
RBC # BLD: 4.34 M/UL
RBC # FLD: 13.8 %
RED BLOOD CELLS URINE: 4 /HPF
SODIUM SERPL-SCNC: 140 MMOL/L
SPECIFIC GRAVITY URINE: 1.03
SQUAMOUS EPITHELIAL CELLS: 11 /HPF
T3FREE SERPL-MCNC: 3.35 PG/ML
T4 FREE SERPL-MCNC: 1.5 NG/DL
TIBC SERPL-MCNC: 299 UG/DL
TRIGL SERPL-MCNC: 185 MG/DL
TSH SERPL-ACNC: 4.57 UIU/ML
UIBC SERPL-MCNC: 215 UG/DL
URATE SERPL-MCNC: 5.6 MG/DL
URINE COMMENTS: NORMAL
UROBILINOGEN URINE: NORMAL
VIT B12 SERPL-MCNC: 1601 PG/ML
WBC # FLD AUTO: 10.79 K/UL
WHITE BLOOD CELLS URINE: 5 /HPF

## 2022-05-13 PROBLEM — N85.8 UTERINE MASS: Status: ACTIVE | Noted: 2021-01-15

## 2022-05-13 PROBLEM — Z86.73 HISTORY OF CEREBROVASCULAR ACCIDENT: Status: RESOLVED | Noted: 2021-01-14 | Resolved: 2022-05-13

## 2022-05-13 PROBLEM — Z86.79 HISTORY OF HEART FAILURE: Status: RESOLVED | Noted: 2021-01-07 | Resolved: 2022-05-13

## 2022-05-13 NOTE — ASSESSMENT
[FreeTextEntry1] : 72 year old female presents as a new patient to establish care\par \par HTN\par - stable\par - c/w Metoprolol, Furosemide, Amlodipine PRN \par \par Dyslipidemia\par - c/w Simvastatin, Zetia\par - check blood work\par \par Diastolic CHF\par - c/w Furosemide \par - f/u with cardiology \par \par Hypothyroidism\par - c/w Levothyroxine\par - check blood work\par \par Post Polio Syndrome\par - recommend physical therapy \par - has family support\par \par Right sided weakness/History of CVA\par - on Plavix\par - recommend physical therapy \par \par Obesity\par - focus on diet \par \par GERD\par - c/w Pantoprazole \par - consider GI follow up\par \par Uterine mass\par - f/u with GYN oncology\par - on Megace \par \par Health care maintenance\par - check blood work\par - referred for a mammogram\par - up to date with colonoscopy \par \par \par sign release to obtain medical records \par

## 2022-05-13 NOTE — HISTORY OF PRESENT ILLNESS
[FreeTextEntry1] : Establish care [de-identified] : 72 year old female presents as a new patient to establish care\par \par Has gastritis, GERD\par on Pantoprazole \par recently admitted to [facility] for gastroenteritis, had episodes of vomiting \par seen by GI \par \par History of Diastolic CHF, Paroxysmal atrial fibrillation, Hypertension, Hyperlipidemia\par Follows with cardiology- due for a follow up \par \par History of a stroke in 2008\par reports having residual right sided weakness \par on Plavix \par \par History of poliomyelitis during infancy \par has resulted in pain and weakness to her legs\par reports right hand gets numb as well \par \par she has a uterine mass\par follows with gynecology oncology \par on Megace\par due to schedule a biopsy \par no plan for surgery at this time \par \par she is Obese- difficulty with exercise due to limitations \par \par lives with her family (, daughter (Omaira), son\par other daughter (Carine) is her health care proxy \par \par had gallbladder surgery in 2021- had an aide after surgery, had a \par currently w/o an aide \par not currently in physical therapy \par tries to do light exercises at home \par \par overdue for mammogram

## 2022-05-13 NOTE — PHYSICAL EXAM
[No Acute Distress] : no acute distress [Well Nourished] : well nourished [Well Developed] : well developed [Normal Sclera/Conjunctiva] : normal sclera/conjunctiva [PERRL] : pupils equal round and reactive to light [Normal Appearance] : was normal in appearance [Neck Supple] : was supple [Normal] : the thyroid was normal [No Respiratory Distress] : no respiratory distress  [Clear to Auscultation] : lungs were clear to auscultation bilaterally [Normal Rate] : normal rate  [Regular Rhythm] : with a regular rhythm [Normal S1, S2] : normal S1 and S2 [No Murmur] : no murmur heard [Soft] : abdomen soft [Non Tender] : non-tender [Normal Bowel Sounds] : normal bowel sounds [Obese] : obese [Normal Posterior Cervical Nodes] : no posterior cervical lymphadenopathy [Normal Anterior Cervical Nodes] : no anterior cervical lymphadenopathy [Alert and Oriented x3] : oriented to person, place, and time [de-identified] : Bilateral cerumen [de-identified] : Bilateral lower extremity edema [de-identified] : Decreased muscle strength  [de-identified] : sitting in a wheelchair

## 2022-05-13 NOTE — HEALTH RISK ASSESSMENT
[Never] : Never [Yes] : Yes [Monthly or less (1 pt)] : Monthly or less (1 point) [1 or 2 (0 pts)] : 1 or 2 (0 points) [Never (0 pts)] : Never (0 points) [No] : In the past 12 months have you used drugs other than those required for medical reasons? No [0] : 2) Feeling down, depressed, or hopeless: Not at all (0) [de-identified] : rare alcohol use [Audit-CScore] : 1 [de-identified] : Not exercising regularly  [de-identified] : Diet is generally good [[id number]] : 0 [ColonoscopyDate] : 2019 [ColonoscopyComments] : done at [place]

## 2022-06-02 ENCOUNTER — NON-APPOINTMENT (OUTPATIENT)
Age: 72
End: 2022-06-02

## 2022-07-04 NOTE — ED PROVIDER NOTE - NSFOLLOWUPINSTRUCTIONS_ED_ALL_ED_FT
Loni Morin  
Abdominal Pain    Many things can cause abdominal pain. Many times, abdominal pain is not caused by a disease and will improve without treatment. Your health care provider will do a physical exam to determine if there is a dangerous cause of your pain; blood tests and imaging may help determine the cause of your pain. However, in many cases, no cause may be found and you may need further testing as an outpatient. Monitor your abdominal pain for any changes.     SEEK IMMEDIATE MEDICAL CARE IF YOU HAVE ANY OF THE FOLLOWING SYMPTOMS: worsening abdominal pain, uncontrollable vomiting, profuse diarrhea, inability to have bowel movements or pass gas, black or bloody stools, fever accompanying chest pain or back pain, or fainting. These symptoms may represent a serious problem that is an emergency. Do not wait to see if the symptoms will go away. Get medical help right away. Call 911 and do not drive yourself to the hospital.    -Please follow-up with your primary care doctor in the next 2 days.  Please call tomorrow for an appointment.  If you cannot follow-up with your primary care doctor please return to the ED for any urgent issues.  - You were given a copy of the tests performed today.  Please bring the results with you and review them with your primary care doctor.  - If you have any worsening of symptoms or any other concerns please return to the ED immediately.  - Please continue taking your home medications as directed.

## 2022-07-21 ENCOUNTER — RX RENEWAL (OUTPATIENT)
Age: 72
End: 2022-07-21

## 2022-08-29 ENCOUNTER — RX RENEWAL (OUTPATIENT)
Age: 72
End: 2022-08-29

## 2022-09-09 ENCOUNTER — APPOINTMENT (OUTPATIENT)
Dept: CARDIOLOGY | Facility: CLINIC | Age: 72
End: 2022-09-09

## 2022-09-09 ENCOUNTER — NON-APPOINTMENT (OUTPATIENT)
Age: 72
End: 2022-09-09

## 2022-09-09 VITALS
HEIGHT: 60 IN | TEMPERATURE: 98.5 F | OXYGEN SATURATION: 98 % | HEART RATE: 101 BPM | SYSTOLIC BLOOD PRESSURE: 158 MMHG | DIASTOLIC BLOOD PRESSURE: 96 MMHG

## 2022-09-09 DIAGNOSIS — R06.02 SHORTNESS OF BREATH: ICD-10-CM

## 2022-09-09 DIAGNOSIS — R00.2 PALPITATIONS: ICD-10-CM

## 2022-09-09 PROCEDURE — 93000 ELECTROCARDIOGRAM COMPLETE: CPT

## 2022-09-09 PROCEDURE — 99214 OFFICE O/P EST MOD 30 MIN: CPT

## 2022-09-09 RX ORDER — MEGESTROL ACETATE 40 MG/1
40 TABLET ORAL
Qty: 120 | Refills: 1 | Status: DISCONTINUED | COMMUNITY
Start: 2021-01-21 | End: 2022-09-09

## 2022-09-09 NOTE — HISTORY OF PRESENT ILLNESS
[FreeTextEntry1] : Patient is a 72-year-old  female with a history of diastolic CHF, hypertension, hyperlipidemia, obesity, history of poliomyelitis history of a stroke, she underwent surgery for gallbladder disease and uterine mass without any postop cardiac events.   Patient was admitted to [facility] in December 2020.    Patient's stroke has affected the right side of the body which is also the same side that she had polio.  Patient reports a history of atrial fibrillation but not on oral anticoagulation.  Patient had a Holter monitor in 2021 it did not show any atrial fibrillation. Patient is confined to wheelchair.\par \par Patient presents for a follow-up visit because she has shortness of breath going from her bed to commode, she also reports episodes of palpitations.  Patient was supposed to come for a follow-up last year in 6 weeks, patient states she could not come because of financial reasons, she has to come by ambulette, it is not affordable for her to make follow-up visits.  Patient does not follow with any neurologist for follow-up of her stroke.  Patient did not bring any of her medications.  \par \par There was no family member who accompanied the patient.\par Patient has no prior history of CAD or myocardial infarction.  \par

## 2022-09-09 NOTE — PHYSICAL EXAM
[Normal] : alert and oriented, normal memory [de-identified] : Chaperone: Chaperone: Fernando Pineda MA [de-identified] : Patient is in the wheelchair, unable to walk [de-identified] : Bilateral trace pitting edema [de-identified] : Patient has right-sided weakness from her prior stroke

## 2022-09-09 NOTE — ASSESSMENT
[FreeTextEntry1] : Sinus  Rhythm \par -Prominent R(V1) -nonspecific. \par  -Nonspecific ST depression   +   Negative T-waves. \par \par ABNORMAL \par \par Echocardiogram April 1, 2021: LVEF 60 to 65%.  Mild concentric LVH.\par Pharmacological nuclear stress test April 1, 2021: Normal study.\par Holter monitor in 2021-no PAF.\par \par EKG 9/9/2022- Sinus  Tachycardia \par Low voltage in precordial leads. \par  -  Nonspecific T-abnormality. \par \par Assessment:\par 1.  Exertional dyspnea-\par 2.  Chest pain\par 3.  History of PAF and patient reports episodes of palpitation\par 4.  History of a stroke with residual right-sided weakness\par 5.  History of diastolic CHF\par 6.  Hypertension/hyperlipidemia and other problems as noted\par \par \par Recommendations:\par \par Patient's blood pressure is elevated, she is not able to provide me the list of medications she is taking, she did not bring the medication bottles, patient was educated to bring her medication bottles to optimize her medical therapy and manage her hypertension.\par \par \par 1.  Patient referred for neurology evaluation for stroke follow-up and electrophysiology for evaluation of ILR.\par 2.  Echocardiogram\par 3.  Follow-up in 2 months\par 4.  Patient states she has a follow-up with her PCP in the next few days.\par \par \par

## 2022-09-12 ENCOUNTER — APPOINTMENT (OUTPATIENT)
Dept: GYNECOLOGIC ONCOLOGY | Facility: CLINIC | Age: 72
End: 2022-09-12

## 2022-09-14 ENCOUNTER — RX RENEWAL (OUTPATIENT)
Age: 72
End: 2022-09-14

## 2022-09-19 ENCOUNTER — APPOINTMENT (OUTPATIENT)
Dept: FAMILY MEDICINE | Facility: CLINIC | Age: 72
End: 2022-09-19

## 2022-09-21 ENCOUNTER — RX RENEWAL (OUTPATIENT)
Age: 72
End: 2022-09-21

## 2022-10-03 ENCOUNTER — APPOINTMENT (OUTPATIENT)
Dept: ELECTROPHYSIOLOGY | Facility: CLINIC | Age: 72
End: 2022-10-03

## 2022-10-05 ENCOUNTER — RX RENEWAL (OUTPATIENT)
Age: 72
End: 2022-10-05

## 2022-10-05 ENCOUNTER — APPOINTMENT (OUTPATIENT)
Dept: GYNECOLOGIC ONCOLOGY | Facility: CLINIC | Age: 72
End: 2022-10-05

## 2022-10-05 VITALS
WEIGHT: 180 LBS | HEART RATE: 103 BPM | RESPIRATION RATE: 16 BRPM | HEIGHT: 60 IN | OXYGEN SATURATION: 98 % | DIASTOLIC BLOOD PRESSURE: 95 MMHG | BODY MASS INDEX: 35.34 KG/M2 | SYSTOLIC BLOOD PRESSURE: 129 MMHG

## 2022-10-05 DIAGNOSIS — N95.0 POSTMENOPAUSAL BLEEDING: ICD-10-CM

## 2022-10-05 PROCEDURE — 58100 BIOPSY OF UTERUS LINING: CPT

## 2022-10-05 NOTE — PHYSICAL EXAM
[Chaperone Present] : A chaperone was present in the examining room during all aspects of the physical examination [FreeTextEntry1] : Evelyn Jeffers MA was present the entire duration of the patient interaction and gynecological exam. [Normal] : Anus and perineum: Normal sphincter tone, no masses, no prolapse. [de-identified] : small amount of brown mucus at cervical os [de-identified] : small cervix, soft [Capable of only limited self care, confined to bed or chair more than 50% of waking hours] : Status 3- Capable of only limited self care, confined to bed or chair more than 50% of waking hours

## 2022-10-05 NOTE — ASSESSMENT
[FreeTextEntry1] : Pt is a 73 yo with PMB on Megace empyrically and is a poor surgical candidate. Endometrial biopsy in office on 8/17/21 with progesterone effect and no evidence of atypia or malignancy. She had repeat biopsy today. She has had only intermittent bleeding but overall doing well.

## 2022-10-05 NOTE — REASON FOR VISIT
[FreeTextEntry1] : [facility] Location \par \par [facility]. [phone]\par [address] [place] [address]

## 2022-10-05 NOTE — HISTORY OF PRESENT ILLNESS
[FreeTextEntry1] : This 73 yo with PMB on Megace empyrically and is a poor surgical candidate. Endometrial biopsy in office on 8/17/21 with progesterone effect and no evidence of atypia or malignancy. Patient was last seen in august 2021 and was advised to continue management with Megace if she is not bleeding. Patient returns for a 1 year follow up. She is here today for repeat Endometrial biopsy. She reports only rare episode of spotting, but no clots or heavy bleeding. She reports no changes to hear health except dizziness for which she has appointment with a Neurologist but no new medications or diagnoses. she presents with her daughter.\par \par She reports that she referred a neighbor who also had episodes of postmenopausal bleeding and gave her my contact info.

## 2022-10-05 NOTE — PROCEDURE
[Endometrial Biopsy] : an endometrial biopsy [Other: ___] : [unfilled] [Patient] : the patient [Verbal Consent] : verbal consent was obtained prior to the procedure and is detailed in the patient's record [Betadine] : betadine [Yes] : the specimen was sent to pathology [No Complications] : none [Tolerated Well] : the patient tolerated the procedure well [Post procedure instructions and information given] : post procedure instructions and information given and reviewed with patient. [2] : 2 [FreeTextEntry1] : Uterus sounded to 8 cm. Pipelled passed 3 times with Hayde clamp on anterior cervix. Scant tissue obtained.

## 2022-10-05 NOTE — END OF VISIT
[FreeTextEntry3] : Follow up in 2 weeks for biopsy results via telehealth\par Continue Megace [FreeTextEntry2] : Evelyn Jeffers MA was present the entire duration of the patient interaction and gynecological exam.\par

## 2022-10-11 ENCOUNTER — APPOINTMENT (OUTPATIENT)
Dept: FAMILY MEDICINE | Facility: CLINIC | Age: 72
End: 2022-10-11

## 2022-10-11 VITALS
OXYGEN SATURATION: 99 % | RESPIRATION RATE: 20 BRPM | DIASTOLIC BLOOD PRESSURE: 80 MMHG | HEART RATE: 116 BPM | SYSTOLIC BLOOD PRESSURE: 130 MMHG

## 2022-10-11 VITALS
HEART RATE: 118 BPM | RESPIRATION RATE: 16 BRPM | HEIGHT: 60 IN | TEMPERATURE: 98 F | WEIGHT: 189 LBS | OXYGEN SATURATION: 98 % | SYSTOLIC BLOOD PRESSURE: 148 MMHG | BODY MASS INDEX: 37.11 KG/M2 | DIASTOLIC BLOOD PRESSURE: 90 MMHG

## 2022-10-11 DIAGNOSIS — Z23 ENCOUNTER FOR IMMUNIZATION: ICD-10-CM

## 2022-10-11 LAB — CORE LAB BIOPSY: NORMAL

## 2022-10-11 PROCEDURE — G0009: CPT

## 2022-10-11 PROCEDURE — G0008: CPT

## 2022-10-11 PROCEDURE — 99214 OFFICE O/P EST MOD 30 MIN: CPT | Mod: 25

## 2022-10-11 PROCEDURE — 90732 PPSV23 VACC 2 YRS+ SUBQ/IM: CPT

## 2022-10-11 PROCEDURE — 90686 IIV4 VACC NO PRSV 0.5 ML IM: CPT

## 2022-10-24 NOTE — ASSESSMENT
[FreeTextEntry1] : 72 year old female presents for a follow up \par \par Dyspnea on exertion\par - check blood work\par - scheduled for an echocardiogram \par - f/u with pulmonology \par \par Diastolic CHF\par - c/w Furosemide\par - seen by cardiology recently\par - scheduled for echocardiogram\par \par HTN\par - stable\par - c/w current medication regimen \par \par Dyslipidemia\par - stable\par - c/w Simvastatin, Zetia\par - check blood work\par \par Prediabetes\par - c/w diet\par - check blood work \par \par Hypothyroidism\par - c/w Levothyroxine\par - check blood work\par \par Right sided weakness/History of CVA\par - on Plavix\par - scheduled to see neurology \par \par Obesity\par - focus on diet \par \par GERD\par - c/w Pantoprazole \par - consider GI follow up\par \par Uterine mass\par - following with GYN oncology\par - on Megace \par \par Health care maintenance\par - reminded to go for mammogram\par - Flu vaccine given, patient tolerated well\par - Prevnar 20 given, patient tolerated well\par \par \par

## 2022-10-24 NOTE — REVIEW OF SYSTEMS
[Cough] : cough [Dyspnea on Exertion] : dyspnea on exertion [Dizziness] : dizziness [Chills] : no chills [Fever] : no fever [Chest Pain] : no chest pain

## 2022-10-24 NOTE — PHYSICAL EXAM
[No Acute Distress] : no acute distress [Well Nourished] : well nourished [Well Developed] : well developed [Normal Appearance] : was normal in appearance [Neck Supple] : was supple [No Respiratory Distress] : no respiratory distress  [Clear to Auscultation] : lungs were clear to auscultation bilaterally [Regular Rhythm] : with a regular rhythm [Normal S1, S2] : normal S1 and S2 [No Murmur] : no murmur heard [Tachycardia] : tachycardic [No Edema] : there was no peripheral edema [Alert and Oriented x3] : oriented to person, place, and time [de-identified] : sitting in a wheelchair

## 2022-10-24 NOTE — HISTORY OF PRESENT ILLNESS
[FreeTextEntry1] : Follow up [de-identified] : 72 year old female presents for a follow up\par \par Has gastritis, GERD\par on Pantoprazole \par \par History of Diastolic CHF, Paroxysmal atrial fibrillation, Hypertension, Hyperlipidemia\par Has followed up with cardiology- referred for echocardiogram\par \par History of a stroke in 2008\par reports having residual right sided weakness \par on Plavix \par reports feeling dizzy and lightheaded\par scheduled an appointment to see neurologist \par \par History of poliomyelitis during infancy \par has resulted in pain and weakness to her legs\par reports right hand gets numb as well \par \par she has a uterine mass\par follows with gynecology oncology \par on Megace\par had an endometrial biopsy done \par \par she is Obese- difficulty with exercise due to limitations \par \par c/o shortness of breath with exertion\par c/o cough\par c/o that she feels she "can't breathe"\par plans to f/u with pulmonology \par \par lives with her family (, daughter (Omaira), son\par other daughter (Carine) is her health care proxy \par \par does not have an aide \par \par overdue for mammogram- did not go \par \par agrees for a flu vaccine and Prevnar vaccine\par no fever\par

## 2022-10-28 LAB
ALBUMIN SERPL ELPH-MCNC: 4.3 G/DL
ALP BLD-CCNC: 114 U/L
ALT SERPL-CCNC: 13 U/L
ANION GAP SERPL CALC-SCNC: 17 MMOL/L
AST SERPL-CCNC: 12 U/L
BASOPHILS # BLD AUTO: 0.03 K/UL
BASOPHILS NFR BLD AUTO: 0.3 %
BILIRUB SERPL-MCNC: 0.2 MG/DL
BUN SERPL-MCNC: 8 MG/DL
CALCIUM SERPL-MCNC: 9.8 MG/DL
CHLORIDE SERPL-SCNC: 103 MMOL/L
CHOLEST SERPL-MCNC: 146 MG/DL
CO2 SERPL-SCNC: 21 MMOL/L
CREAT SERPL-MCNC: 0.72 MG/DL
EGFR: 88 ML/MIN/1.73M2
EOSINOPHIL # BLD AUTO: 0.14 K/UL
EOSINOPHIL NFR BLD AUTO: 1.5 %
ESTIMATED AVERAGE GLUCOSE: 137 MG/DL
GLUCOSE SERPL-MCNC: 122 MG/DL
HBA1C MFR BLD HPLC: 6.4 %
HCT VFR BLD CALC: 44.2 %
HDLC SERPL-MCNC: 47 MG/DL
HGB BLD-MCNC: 14.1 G/DL
IMM GRANULOCYTES NFR BLD AUTO: 0.6 %
LDLC SERPL CALC-MCNC: 67 MG/DL
LYMPHOCYTES # BLD AUTO: 2.26 K/UL
LYMPHOCYTES NFR BLD AUTO: 23.7 %
MAN DIFF?: NORMAL
MCHC RBC-ENTMCNC: 29.7 PG
MCHC RBC-ENTMCNC: 31.9 GM/DL
MCV RBC AUTO: 93.2 FL
MONOCYTES # BLD AUTO: 0.51 K/UL
MONOCYTES NFR BLD AUTO: 5.4 %
NEUTROPHILS # BLD AUTO: 6.53 K/UL
NEUTROPHILS NFR BLD AUTO: 68.5 %
NONHDLC SERPL-MCNC: 99 MG/DL
PLATELET # BLD AUTO: 431 K/UL
POTASSIUM SERPL-SCNC: 3.8 MMOL/L
PROT SERPL-MCNC: 7.4 G/DL
RBC # BLD: 4.74 M/UL
RBC # FLD: 14.2 %
SODIUM SERPL-SCNC: 140 MMOL/L
T4 FREE SERPL-MCNC: 1.4 NG/DL
TRIGL SERPL-MCNC: 159 MG/DL
TSH SERPL-ACNC: 6.58 UIU/ML
WBC # FLD AUTO: 9.53 K/UL

## 2022-11-04 ENCOUNTER — APPOINTMENT (OUTPATIENT)
Dept: GYNECOLOGIC ONCOLOGY | Facility: CLINIC | Age: 72
End: 2022-11-04

## 2022-11-04 PROCEDURE — 99213 OFFICE O/P EST LOW 20 MIN: CPT | Mod: 95

## 2022-11-04 NOTE — END OF VISIT
[FreeTextEntry3] : RTC in 6 months for repeat biopsy [Time Spent: ___ minutes] : I have spent [unfilled] minutes of time on the encounter.

## 2022-11-04 NOTE — ASSESSMENT
[FreeTextEntry1] : This 71 yo with PMB on Megace empyrically and is a poor surgical candidate. Endometrial biopsy in office on 8/17/21 with progesterone effect and no evidence of atypia or malignancy. She is high risk fo endoemtrial hyperplasia and malignancy and was started on empyric treatment. She had repeat biopsy on 10/5 and presents to discuss results. She has been having no additional bleeding. \par \par Endometrial biopsy with no atypia or malignancy. Continue megace.

## 2022-11-04 NOTE — HISTORY OF PRESENT ILLNESS
[Home] : at home, [unfilled] , at the time of the visit. [Other Location: e.g. Home (Enter Location, City,State)___] : at [unfilled] [Verbal consent obtained from patient] : the patient, [unfilled] [FreeTextEntry1] : This 73 yo with PMB on Megace empyrically and is a poor surgical candidate. Endometrial biopsy in office on 8/17/21 with progesterone effect and no evidence of atypia or malignancy. She is high risk fo endoemtrial hyperplasia and malignancy and was started on empyric treatment. She had repeat biopsy on 10/5 and presents to discuss results. She has been having no additional bleeding.

## 2022-11-11 ENCOUNTER — APPOINTMENT (OUTPATIENT)
Dept: CARDIOLOGY | Facility: CLINIC | Age: 72
End: 2022-11-11

## 2022-12-14 ENCOUNTER — RX RENEWAL (OUTPATIENT)
Age: 72
End: 2022-12-14

## 2023-01-04 ENCOUNTER — APPOINTMENT (OUTPATIENT)
Dept: CARDIOLOGY | Facility: CLINIC | Age: 73
End: 2023-01-04

## 2023-01-05 ENCOUNTER — RX RENEWAL (OUTPATIENT)
Age: 73
End: 2023-01-05

## 2023-01-10 ENCOUNTER — APPOINTMENT (OUTPATIENT)
Dept: FAMILY MEDICINE | Facility: CLINIC | Age: 73
End: 2023-01-10
Payer: MEDICARE

## 2023-01-10 VITALS
SYSTOLIC BLOOD PRESSURE: 124 MMHG | WEIGHT: 200 LBS | TEMPERATURE: 97.1 F | OXYGEN SATURATION: 97 % | HEART RATE: 124 BPM | DIASTOLIC BLOOD PRESSURE: 82 MMHG | HEIGHT: 60 IN | BODY MASS INDEX: 39.27 KG/M2

## 2023-01-10 VITALS — HEART RATE: 120 BPM

## 2023-01-10 DIAGNOSIS — R00.0 TACHYCARDIA, UNSPECIFIED: ICD-10-CM

## 2023-01-10 DIAGNOSIS — R53.83 OTHER FATIGUE: ICD-10-CM

## 2023-01-10 PROCEDURE — 99214 OFFICE O/P EST MOD 30 MIN: CPT

## 2023-01-10 NOTE — HISTORY OF PRESENT ILLNESS
[FreeTextEntry1] : Follow up [de-identified] : 72 year old female presents for a follow up\par \par Has Hypothyroidism- unclear if she is taking the new dose of Levothyroxine 88 mcg daily\par \par she c/o feeling fatigued, having low energy\par did have a recent viral illness- had congestion, sore throat, diarrhea\par \par Has gastritis, GERD\par on Pantoprazole \par \par History of Diastolic CHF, Paroxysmal atrial fibrillation, Hypertension, Hyperlipidemia\par Has followed up with cardiology\par \par History of a stroke in 2008\par reports having residual right sided weakness \par on Plavix \par \par History of poliomyelitis during infancy \par has resulted in pain and weakness to her legs\par reports right hand gets numb as well \par \par she has a uterine mass\par follows with gynecology oncology \par on Megace\par had an endometrial biopsy done \par \par she is Obese- difficulty with exercise due to limitations \par \par lives with her family (, daughter (Omaira), son\par other daughter (Carine) is her health care proxy \par \par does not have an aide \par \par overdue for mammogram- did not go \par \par c/o constipation\par takes Miralax \par does not hydrate well with water\par

## 2023-01-10 NOTE — PHYSICAL EXAM
[No Acute Distress] : no acute distress [Well Nourished] : well nourished [Well Developed] : well developed [Normal Appearance] : was normal in appearance [Neck Supple] : was supple [No Respiratory Distress] : no respiratory distress  [Clear to Auscultation] : lungs were clear to auscultation bilaterally [Normal Rate] : normal rate  [Regular Rhythm] : with a regular rhythm [Normal S1, S2] : normal S1 and S2 [No Murmur] : no murmur heard [No Edema] : there was no peripheral edema [Soft] : abdomen soft [Non Tender] : non-tender [de-identified] : a [de-identified] : Sitting in a wheelchair  [de-identified] : Alert

## 2023-01-10 NOTE — REVIEW OF SYSTEMS
[Fatigue] : fatigue [Dyspnea on Exertion] : dyspnea on exertion [Fever] : no fever [Chest Pain] : no chest pain [Cough] : no cough [Abdominal Pain] : no abdominal pain

## 2023-01-10 NOTE — ASSESSMENT
[FreeTextEntry1] : 72 year old female presents for a follow up \par \par Hypothyroidism\par - c/w Levothyroxine\par - check blood work, reassess \par \par HTN\par - stable\par - c/w current medication regimen \par \par Prediabetes\par - check blood work\par - recommend starting Metformin, patient is agreeable, will wait for results \par \par Fatigue\par - check blood work\par \par Diastolic CHF\par - c/w Furosemide\par - f/u with cardiology (did not go for echo previously ordered)\par \par Sinus tachycardia\par - may be secondary from dehydration\par - check blood work\par - c/w Metoprolol\par - f/u with cardiology \par \par Dyslipidemia\par - stable\par - c/w Simvastatin, Zetia\par - check blood work\par \par Right sided weakness/History of CVA\par - on Plavix\par - f/u with neurology \par - will refer for home physical therapy \par \par Obesity\par - focus on diet \par \par GERD\par - c/w Pantoprazole \par - consider GI follow up\par \par Uterine mass\par - following with GYN oncology\par - on Megace \par

## 2023-01-20 ENCOUNTER — NON-APPOINTMENT (OUTPATIENT)
Age: 73
End: 2023-01-20

## 2023-01-20 LAB
25(OH)D3 SERPL-MCNC: 47 NG/ML
ALBUMIN SERPL ELPH-MCNC: 4.2 G/DL
ALP BLD-CCNC: 124 U/L
ALT SERPL-CCNC: 15 U/L
ANION GAP SERPL CALC-SCNC: 16 MMOL/L
AST SERPL-CCNC: 10 U/L
BASOPHILS # BLD AUTO: 0.03 K/UL
BASOPHILS NFR BLD AUTO: 0.3 %
BILIRUB SERPL-MCNC: 0.3 MG/DL
BUN SERPL-MCNC: 11 MG/DL
CALCIUM SERPL-MCNC: 9.7 MG/DL
CHLORIDE SERPL-SCNC: 103 MMOL/L
CHOLEST SERPL-MCNC: 143 MG/DL
CO2 SERPL-SCNC: 19 MMOL/L
CREAT SERPL-MCNC: 0.81 MG/DL
EGFR: 77 ML/MIN/1.73M2
EOSINOPHIL # BLD AUTO: 0.1 K/UL
EOSINOPHIL NFR BLD AUTO: 0.9 %
ESTIMATED AVERAGE GLUCOSE: 123 MG/DL
FOLATE SERPL-MCNC: 10.4 NG/ML
GLUCOSE SERPL-MCNC: 123 MG/DL
HBA1C MFR BLD HPLC: 5.9 %
HCT VFR BLD CALC: 43.8 %
HDLC SERPL-MCNC: 41 MG/DL
HGB BLD-MCNC: 14.1 G/DL
IMM GRANULOCYTES NFR BLD AUTO: 0.4 %
LDLC SERPL CALC-MCNC: 78 MG/DL
LYMPHOCYTES # BLD AUTO: 1.83 K/UL
LYMPHOCYTES NFR BLD AUTO: 16.3 %
MAN DIFF?: NORMAL
MCHC RBC-ENTMCNC: 29.6 PG
MCHC RBC-ENTMCNC: 32.2 GM/DL
MCV RBC AUTO: 92 FL
MONOCYTES # BLD AUTO: 0.67 K/UL
MONOCYTES NFR BLD AUTO: 6 %
NEUTROPHILS # BLD AUTO: 8.59 K/UL
NEUTROPHILS NFR BLD AUTO: 76.1 %
NONHDLC SERPL-MCNC: 102 MG/DL
PLATELET # BLD AUTO: 491 K/UL
POTASSIUM SERPL-SCNC: 4 MMOL/L
PROT SERPL-MCNC: 7.4 G/DL
RBC # BLD: 4.76 M/UL
RBC # FLD: 13.7 %
SODIUM SERPL-SCNC: 139 MMOL/L
T3FREE SERPL-MCNC: 3.02 PG/ML
T4 FREE SERPL-MCNC: 1.5 NG/DL
TRIGL SERPL-MCNC: 121 MG/DL
TSH SERPL-ACNC: 5.13 UIU/ML
VIT B12 SERPL-MCNC: 972 PG/ML
WBC # FLD AUTO: 11.26 K/UL

## 2023-01-20 RX ORDER — LEVOTHYROXINE SODIUM 0.07 MG/1
75 TABLET ORAL
Qty: 90 | Refills: 0 | Status: DISCONTINUED | COMMUNITY
Start: 2022-09-14 | End: 2023-01-20

## 2023-01-25 RX ORDER — SENNOSIDES 8.6 MG/1
8.6 TABLET ORAL
Qty: 90 | Refills: 0 | Status: ACTIVE | COMMUNITY
Start: 1900-01-01 | End: 1900-01-01

## 2023-02-23 ENCOUNTER — TRANSCRIPTION ENCOUNTER (OUTPATIENT)
Age: 73
End: 2023-02-23

## 2023-02-28 ENCOUNTER — RX RENEWAL (OUTPATIENT)
Age: 73
End: 2023-02-28

## 2023-03-01 ENCOUNTER — RX RENEWAL (OUTPATIENT)
Age: 73
End: 2023-03-01

## 2023-03-15 ENCOUNTER — RX RENEWAL (OUTPATIENT)
Age: 73
End: 2023-03-15

## 2023-04-10 ENCOUNTER — RX RENEWAL (OUTPATIENT)
Age: 73
End: 2023-04-10

## 2023-05-01 ENCOUNTER — APPOINTMENT (OUTPATIENT)
Dept: FAMILY MEDICINE | Facility: CLINIC | Age: 73
End: 2023-05-01
Payer: MEDICARE

## 2023-05-01 VITALS
WEIGHT: 177 LBS | TEMPERATURE: 97.1 F | SYSTOLIC BLOOD PRESSURE: 134 MMHG | DIASTOLIC BLOOD PRESSURE: 90 MMHG | OXYGEN SATURATION: 98 % | HEART RATE: 123 BPM | HEIGHT: 60 IN | BODY MASS INDEX: 34.75 KG/M2

## 2023-05-01 VITALS
HEART RATE: 118 BPM | RESPIRATION RATE: 18 BRPM | OXYGEN SATURATION: 99 % | DIASTOLIC BLOOD PRESSURE: 80 MMHG | SYSTOLIC BLOOD PRESSURE: 140 MMHG

## 2023-05-01 DIAGNOSIS — Z00.00 ENCOUNTER FOR GENERAL ADULT MEDICAL EXAMINATION W/OUT ABNORMAL FINDINGS: ICD-10-CM

## 2023-05-01 DIAGNOSIS — R06.09 OTHER FORMS OF DYSPNEA: ICD-10-CM

## 2023-05-01 DIAGNOSIS — K21.9 GASTRO-ESOPHAGEAL REFLUX DISEASE W/OUT ESOPHAGITIS: ICD-10-CM

## 2023-05-01 PROCEDURE — 99214 OFFICE O/P EST MOD 30 MIN: CPT | Mod: 25

## 2023-05-01 PROCEDURE — G0439: CPT

## 2023-05-01 RX ORDER — POTASSIUM CHLORIDE 750 MG/1
10 TABLET, EXTENDED RELEASE ORAL
Qty: 90 | Refills: 0 | Status: COMPLETED | COMMUNITY
Start: 2022-12-15 | End: 1900-01-01

## 2023-05-02 LAB
25(OH)D3 SERPL-MCNC: 42.4 NG/ML
ALBUMIN SERPL ELPH-MCNC: 4.1 G/DL
ALP BLD-CCNC: 127 U/L
ALT SERPL-CCNC: 13 U/L
ANION GAP SERPL CALC-SCNC: 20 MMOL/L
AST SERPL-CCNC: 13 U/L
BASOPHILS # BLD AUTO: 0.03 K/UL
BASOPHILS NFR BLD AUTO: 0.3 %
BILIRUB SERPL-MCNC: 0.3 MG/DL
BUN SERPL-MCNC: 10 MG/DL
CALCIUM SERPL-MCNC: 9.6 MG/DL
CHLORIDE SERPL-SCNC: 105 MMOL/L
CHOLEST SERPL-MCNC: 140 MG/DL
CO2 SERPL-SCNC: 15 MMOL/L
CREAT SERPL-MCNC: 0.71 MG/DL
EGFR: 89 ML/MIN/1.73M2
EOSINOPHIL # BLD AUTO: 0.14 K/UL
EOSINOPHIL NFR BLD AUTO: 1.3 %
ESTIMATED AVERAGE GLUCOSE: 128 MG/DL
FERRITIN SERPL-MCNC: 81 NG/ML
FOLATE SERPL-MCNC: 10.3 NG/ML
GLUCOSE SERPL-MCNC: 119 MG/DL
HBA1C MFR BLD HPLC: 6.1 %
HCT VFR BLD CALC: 44 %
HDLC SERPL-MCNC: 42 MG/DL
HGB BLD-MCNC: 14.1 G/DL
IMM GRANULOCYTES NFR BLD AUTO: 0.6 %
IRON SATN MFR SERPL: 23 %
IRON SERPL-MCNC: 80 UG/DL
LDLC SERPL CALC-MCNC: 71 MG/DL
LYMPHOCYTES # BLD AUTO: 1.69 K/UL
LYMPHOCYTES NFR BLD AUTO: 15.4 %
MAN DIFF?: NORMAL
MCHC RBC-ENTMCNC: 30.2 PG
MCHC RBC-ENTMCNC: 32 GM/DL
MCV RBC AUTO: 94.2 FL
MONOCYTES # BLD AUTO: 0.47 K/UL
MONOCYTES NFR BLD AUTO: 4.3 %
NEUTROPHILS # BLD AUTO: 8.59 K/UL
NEUTROPHILS NFR BLD AUTO: 78.1 %
NONHDLC SERPL-MCNC: 98 MG/DL
PLATELET # BLD AUTO: 421 K/UL
POTASSIUM SERPL-SCNC: 4 MMOL/L
PROT SERPL-MCNC: 7.2 G/DL
RBC # BLD: 4.67 M/UL
RBC # FLD: 13.5 %
SODIUM SERPL-SCNC: 140 MMOL/L
T4 FREE SERPL-MCNC: 1.4 NG/DL
TIBC SERPL-MCNC: 358 UG/DL
TRIGL SERPL-MCNC: 135 MG/DL
TSH SERPL-ACNC: 8.17 UIU/ML
UIBC SERPL-MCNC: 277 UG/DL
URATE SERPL-MCNC: 5.1 MG/DL
VIT B12 SERPL-MCNC: 824 PG/ML
WBC # FLD AUTO: 10.99 K/UL

## 2023-05-04 ENCOUNTER — RX RENEWAL (OUTPATIENT)
Age: 73
End: 2023-05-04

## 2023-05-04 RX ORDER — PANTOPRAZOLE 40 MG/1
40 TABLET, DELAYED RELEASE ORAL
Qty: 90 | Refills: 1 | Status: ACTIVE | COMMUNITY
Start: 2023-04-10 | End: 1900-01-01

## 2023-05-04 RX ORDER — METOPROLOL SUCCINATE 200 MG/1
200 TABLET, EXTENDED RELEASE ORAL
Qty: 90 | Refills: 1 | Status: ACTIVE | COMMUNITY
Start: 2023-03-01 | End: 1900-01-01

## 2023-05-11 NOTE — PATIENT PROFILE ADULT - FLU SEASON?
Low Fiber Diet      Please follow 7 days prior to procedure      Eating a low-fiber diet means eating foods that don’t have much fiber. These foods are easy to digest.      Most of the fiber that you eat passes undigested through your bowel. This is what forms stool. Low-fiber foods can help to slow down your bowel movements. When you eat a low-fiber diet, you have fewer stools. This lets your intestine rest.      Your healthcare provider will tell you how long you need to be on this diet. It may only be for a short time. Low-fiber foods often don’t give you all the nutrients you need to stay healthy. Your healthcare provider may have you take certain vitamins while you are on this diet.      Reasons to eat a low-fiber diet   The goal of a low-fiber diet is to limit the size and number of your stools. It may be prescribed if you:      Are going through chemotherapy or radiation treatments    Have had intestinal surgery    Have trouble digesting food   Have a condition that affects your intestine, such as diarrhea, irritable bowel syndrome, Crohn’s disease, ulcerative colitis, or diverticulitis      General guidelines for a low-fiber diet   In general, a low-fiber diet means having fewer than 13 grams of fiber a day. Your healthcare provider may give you a list of things you can and can’t eat or drink. Read food labels. Choose foods and drinks that have as close to zero grams of fiber as possible. Here are general guidelines to follow:      Breads, pasta, cereal, rice, and other starches (6 to 11 servings daily)  What to choose: white bread, biscuits, muffins, and white rolls; plain crackers; waffles; white pasta; white rice; cream of wheat; grits; white pancakes; corn flakes; cooked potatoes without skin; pretzels. Fiber content of these foods should be less than 0.5 (1/2) gram per serving.    What to pass up: whole-wheat or whole-grain breads, crackers, and pasta; breads with seeds or nuts; wheat germ; shannan  crackers; cornbread; wild or brown rice; cereals with whole-grain, bran, and granola; cereals with seeds, nuts, coconut, or dried fruit; potatoes with skin      Milk and dairy (2 servings daily)  What to choose: milk, buttermilk; yogurt or ice cream without seeds or nuts; custard or pudding; sour cream; cheese and cottage cheese; cream sauces, soups, and casseroles    What to pass up: ice cream and yogurt with seeds, nuts, or fruit chunks      Fruit (2 to 4 servings daily)  What to choose: ripe banana; ripe nectarine, peach, apricot, papaya, and plum; soft honeydew melon and cantaloupe; cooked or canned fruit without skin or seeds (not sweetened with sorbitol); applesauce; strained fruit juice (without pulp)    What to pass up: raw or dried fruit; all berries; raisins; canned and raw pineapple; prunes and prune juice; fruit juice with pulp      Vegetables (3 to 5 servings daily)  What to choose: well-cooked or canned vegetables without seeds, such as spinach, eggplant, green and wax beans, carrots, yellow squash, pumpkin; lettuce on a sandwich    What to pass up: all raw or steamed vegetables; vegetables with seeds, such as unstrained tomato sauce; green peas; lima beans; broccoli; corn; parsnips      Meats and protein (4 to 6 ounces daily)   What to choose: tender, well-cooked meat, including ground meat, poultry, and fish; eggs; tofu; creamy peanut butter    What to pass up: processed meats such as hot dogs and sausages, tough, chewy meat with gristle; peas, including split, yellow, and black-eyed; beans, including navy, lima, black, garbanzo, soy, best, and lentil; peanuts and crunchy peanut butter       Fats, oils, sauces, condiments (fewer than 8 teaspoons daily)   What to choose: butter, margarine, oils, whipped cream, sour cream, mayonnaise, smooth dressings and sauces; plain gravy; smooth condiments    What to pass up: dressing with seeds or fruit chunks; pickles and relishes      Other foods and drinks    What to choose: water; plain gelatin; plain puddings; pretzels; plain cookies and cakes; honey, syrup; decaffeinated drinks, including tea and coffee      What to pass up: popcorn; potato chips; spicy foods; fried, greasy foods; marmalade, jam, and preserves; desserts that have seeds, nuts, coconut, dried fruit, whole grains, or bran; candy that has seeds or nuts.                  No

## 2023-05-17 PROBLEM — K21.9 GERD (GASTROESOPHAGEAL REFLUX DISEASE): Status: ACTIVE | Noted: 2021-01-14

## 2023-05-17 NOTE — ASSESSMENT
[FreeTextEntry1] : 73 year old female presents for an annual physical \par \par Health care maintenance:\par check blood work, blood drawn in office\par follow up with optometry/ophthalmology and dentist for routine exams\par declines mammogram at this time\par reports up to date with colonoscopy \par recommend Shingles vaccine \par \par HTN\par - stable\par - c/w current medication regimen \par \par Dyslipidemia\par - stable\par - c/w Simvastatin, Zetia\par - check blood work\par \par Prediabetes\par - c/w diet \par - check blood work\par \par Hypothyroidism\par - c/w Levothyroxine\par - check blood work, reassess \par \par Obesity\par - focus on diet \par - currently with difficulty for exercise\par \par Dyspnea on exertion\par - f/u with pulmonology \par - f/u with cardiology \par \par Diastolic CHF\par - c/w Furosemide\par - f/u with cardiology \par \par Right sided weakness/History of CVA\par - on Plavix\par - f/u with neurology \par - will refer back to home physical therapy \par \par GERD\par - c/w Pantoprazole \par - consider GI follow up\par \par Uterine mass\par - following with GYN oncology\par - on Megace \par

## 2023-05-17 NOTE — PHYSICAL EXAM
[No Acute Distress] : no acute distress [Well Nourished] : well nourished [Well Developed] : well developed [Normal Sclera/Conjunctiva] : normal sclera/conjunctiva [PERRL] : pupils equal round and reactive to light [Normal Oropharynx] : the oropharynx was normal [Normal Appearance] : was normal in appearance [Neck Supple] : was supple [No Respiratory Distress] : no respiratory distress  [Clear to Auscultation] : lungs were clear to auscultation bilaterally [Regular Rhythm] : with a regular rhythm [Normal S1, S2] : normal S1 and S2 [No Murmur] : no murmur heard [Tachycardia] : tachycardic [No Edema] : there was no peripheral edema [Soft] : abdomen soft [Non Tender] : non-tender [Obese] : obese [Normal Anterior Cervical Nodes] : no anterior cervical lymphadenopathy [No Rash] : no rash [Alert and Oriented x3] : oriented to person, place, and time [Appropriate] : appropriate [de-identified] : Bilateral cerumen [de-identified] : sitting in a wheelchair

## 2023-05-17 NOTE — HEALTH RISK ASSESSMENT
[Yes] : Yes [Monthly or less (1 pt)] : Monthly or less (1 point) [1 or 2 (0 pts)] : 1 or 2 (0 points) [Never (0 pts)] : Never (0 points) [Never] : Never [No] : In the past 12 months have you used drugs other than those required for medical reasons? No [No falls in past year] : Patient reported no falls in the past year [Patient declined mammogram] : Patient declined mammogram [Transportation] : transportation [With Significant Other] : lives with significant other [With Family] : lives with family [] :  [# Of Children ___] : has [unfilled] children [Feels Safe at Home] : Feels safe at home [Independent] : using telephone [Full assistance needed] : managing finances [Reports changes in vision] : Reports changes in vision [Smoke Detector] : smoke detector [Carbon Monoxide Detector] : carbon monoxide detector [Seat Belt] :  uses seat belt [With Patient/Caregiver] : , with patient/caregiver [Designated Healthcare Proxy] : Designated healthcare proxy [Name: ___] : Health Care Proxy's Name: [unfilled]  [Relationship: ___] : Relationship: [unfilled] [Patient reported colonoscopy was normal] : Patient reported colonoscopy was normal [Audit-CScore] : 1 [de-identified] : Leg and arm exercises/stretches at home  [de-identified] : Decreased appetite- skips breakfast sometimes  [Change in mental status noted] : No change in mental status noted [Reports changes in hearing] : Reports no changes in hearing [ColonoscopyDate] : 2019 [de-identified] : Had been on disability, now on social security  [FreeTextEntry8] : has a wheelchair and walker [AdvancecareDate] : 05/2023

## 2023-05-17 NOTE — REVIEW OF SYSTEMS
[Dyspnea on Exertion] : dyspnea on exertion [Joint Pain] : joint pain [Muscle Pain] : muscle pain [Back Pain] : back pain [Dizziness] : dizziness [Negative] : Heme/Lymph [Cough] : no cough [de-identified] : off balanced

## 2023-05-17 NOTE — HISTORY OF PRESENT ILLNESS
[FreeTextEntry1] : Annual physical  [de-identified] : 73 year old female presents for an annual physical. \par \par Has Hypothyroidism- on Levothyroxine 88 mcg daily\par \par Has gastritis, GERD\par on Pantoprazole \par \par History of Diastolic CHF, Paroxysmal atrial fibrillation, Hypertension, Hyperlipidemia\par following with cardiology- last seen in September 2022 \par \par History of a stroke in 2008\par reports having residual right sided weakness \par on Plavix \par \par History of poliomyelitis during infancy \par has resulted in pain and weakness to her legs\par reports right hand gets numb as well \par \par does not have an aide\par she reports having assistance from her daughter, Omaira \par having difficulty moving\par tried home physical therapy- had to stop sessions as she had difficulty getting out of bed, wants to try again\par c/o feeling more off balanced\par \par c/o worsening shortness of breath \par \par she has a uterine mass\par follows with gynecology oncology \par on Megace\par had an endometrial biopsy done \par \par she is Obese- difficulty with exercise due to limitations \par \par lives with her family (, daughter (Omaira), son\par other daughter (Carine) is her health care proxy \par \par does not have an aide \par \par overdue for mammogram- did not go \par reports having difficulty standing for exam\par declines at this time \par \par h/o constipation\par takes Miralax \par reports improving water hydration \par

## 2023-06-23 ENCOUNTER — APPOINTMENT (OUTPATIENT)
Dept: FAMILY MEDICINE | Facility: CLINIC | Age: 73
End: 2023-06-23

## 2023-06-26 ENCOUNTER — RX RENEWAL (OUTPATIENT)
Age: 73
End: 2023-06-26

## 2023-07-19 ENCOUNTER — RX RENEWAL (OUTPATIENT)
Age: 73
End: 2023-07-19

## 2023-08-10 ENCOUNTER — RX RENEWAL (OUTPATIENT)
Age: 73
End: 2023-08-10

## 2023-08-21 ENCOUNTER — RX RENEWAL (OUTPATIENT)
Age: 73
End: 2023-08-21

## 2023-08-28 ENCOUNTER — RX RENEWAL (OUTPATIENT)
Age: 73
End: 2023-08-28

## 2023-08-31 ENCOUNTER — NON-APPOINTMENT (OUTPATIENT)
Age: 73
End: 2023-08-31

## 2023-09-07 ENCOUNTER — APPOINTMENT (OUTPATIENT)
Dept: PULMONOLOGY | Facility: CLINIC | Age: 73
End: 2023-09-07

## 2023-09-12 ENCOUNTER — APPOINTMENT (OUTPATIENT)
Dept: FAMILY MEDICINE | Facility: CLINIC | Age: 73
End: 2023-09-12

## 2023-09-19 ENCOUNTER — APPOINTMENT (OUTPATIENT)
Dept: CARDIOLOGY | Facility: CLINIC | Age: 73
End: 2023-09-19

## 2023-09-21 ENCOUNTER — APPOINTMENT (OUTPATIENT)
Dept: FAMILY MEDICINE | Facility: CLINIC | Age: 73
End: 2023-09-21
Payer: MEDICARE

## 2023-09-21 PROCEDURE — 99442: CPT

## 2023-09-27 ENCOUNTER — NON-APPOINTMENT (OUTPATIENT)
Age: 73
End: 2023-09-27

## 2023-09-28 ENCOUNTER — RX RENEWAL (OUTPATIENT)
Age: 73
End: 2023-09-28

## 2023-10-03 ENCOUNTER — APPOINTMENT (OUTPATIENT)
Dept: FAMILY MEDICINE | Facility: CLINIC | Age: 73
End: 2023-10-03
Payer: MEDICARE

## 2023-10-03 DIAGNOSIS — R42 DIZZINESS AND GIDDINESS: ICD-10-CM

## 2023-10-03 DIAGNOSIS — E87.6 HYPOKALEMIA: ICD-10-CM

## 2023-10-03 PROCEDURE — 99214 OFFICE O/P EST MOD 30 MIN: CPT | Mod: 95

## 2023-10-03 RX ORDER — POTASSIUM CHLORIDE 750 MG/1
10 TABLET, FILM COATED, EXTENDED RELEASE ORAL DAILY
Qty: 90 | Refills: 1 | Status: DISCONTINUED | COMMUNITY
End: 2023-10-03

## 2023-10-03 RX ORDER — FERROUS SULFATE TAB 325 MG (65 MG ELEMENTAL FE) 325 (65 FE) MG
325 (65 FE) TAB ORAL
Qty: 90 | Refills: 0 | Status: ACTIVE | COMMUNITY
Start: 2023-10-03

## 2023-10-03 RX ORDER — AMLODIPINE BESYLATE 2.5 MG/1
2.5 TABLET ORAL DAILY
Qty: 90 | Refills: 0 | Status: DISCONTINUED | COMMUNITY
Start: 2023-10-03 | End: 2023-10-03

## 2023-10-09 ENCOUNTER — LABORATORY RESULT (OUTPATIENT)
Age: 73
End: 2023-10-09

## 2023-10-10 ENCOUNTER — LABORATORY RESULT (OUTPATIENT)
Age: 73
End: 2023-10-10

## 2023-10-17 DIAGNOSIS — R05.9 COUGH, UNSPECIFIED: ICD-10-CM

## 2023-10-19 ENCOUNTER — APPOINTMENT (OUTPATIENT)
Dept: GYNECOLOGIC ONCOLOGY | Facility: CLINIC | Age: 73
End: 2023-10-19

## 2023-10-27 ENCOUNTER — RX RENEWAL (OUTPATIENT)
Age: 73
End: 2023-10-27

## 2023-10-27 RX ORDER — STANDARDIZED SENNA CONCENTRATE 8.6 MG/1
8.6 TABLET ORAL
Qty: 90 | Refills: 1 | Status: ACTIVE | COMMUNITY
Start: 2023-08-10 | End: 1900-01-01

## 2023-12-13 ENCOUNTER — APPOINTMENT (OUTPATIENT)
Dept: FAMILY MEDICINE | Facility: CLINIC | Age: 73
End: 2023-12-13
Payer: MEDICARE

## 2023-12-13 PROCEDURE — 99441: CPT

## 2023-12-21 ENCOUNTER — RX RENEWAL (OUTPATIENT)
Age: 73
End: 2023-12-21

## 2024-01-17 ENCOUNTER — APPOINTMENT (OUTPATIENT)
Dept: FAMILY MEDICINE | Facility: CLINIC | Age: 74
End: 2024-01-17

## 2024-01-26 ENCOUNTER — RX RENEWAL (OUTPATIENT)
Age: 74
End: 2024-01-26

## 2024-01-26 ENCOUNTER — APPOINTMENT (OUTPATIENT)
Dept: FAMILY MEDICINE | Facility: CLINIC | Age: 74
End: 2024-01-26
Payer: MEDICARE

## 2024-01-26 DIAGNOSIS — R53.1 WEAKNESS: ICD-10-CM

## 2024-01-26 DIAGNOSIS — F41.9 ANXIETY DISORDER, UNSPECIFIED: ICD-10-CM

## 2024-01-26 PROCEDURE — 99442: CPT

## 2024-02-05 ENCOUNTER — RX RENEWAL (OUTPATIENT)
Age: 74
End: 2024-02-05

## 2024-02-05 RX ORDER — DOCUSATE SODIUM 100 MG/1
100 CAPSULE, LIQUID FILLED ORAL TWICE DAILY
Qty: 180 | Refills: 3 | Status: ACTIVE | COMMUNITY
Start: 2023-01-05 | End: 1900-01-01

## 2024-02-07 NOTE — DISCHARGE NOTE NURSING/CASE MANAGEMENT/SOCIAL WORK - NSSCCARECORD_GEN_ALL_CORE
Patient just called back and after consulting her provider for the hysterectomy , she will be able to have the carpal tunnel sx because they now need to move her hysterectomy further out.  Please keep patient scheduled for left carpal tunnel on 2/20/24    
Patient needs to cancel her left carpal tunnel sx scheduled for 2/20/24 because she needs to have a hysterectomy.  That sx is scheduled for 3/5/24.  Patient is not sure when she can reschedule her carpal tunnel sx but will call back when she learns more about her hysterectomy.  She does know she needs to wait between 4 and 8 weeks.    
[facility]

## 2024-02-12 ENCOUNTER — RX RENEWAL (OUTPATIENT)
Age: 74
End: 2024-02-12

## 2024-02-14 ENCOUNTER — NON-APPOINTMENT (OUTPATIENT)
Age: 74
End: 2024-02-14

## 2024-02-20 RX ORDER — FOLIC ACID 5 MG
5 CAPSULE ORAL
Qty: 90 | Refills: 1 | Status: ACTIVE | COMMUNITY
Start: 1900-01-01 | End: 1900-01-01

## 2024-02-20 RX ORDER — ALBUTEROL SULFATE 90 UG/1
108 (90 BASE) INHALANT RESPIRATORY (INHALATION)
Qty: 18 | Refills: 1 | Status: ACTIVE | COMMUNITY
Start: 2022-03-11 | End: 1900-01-01

## 2024-02-20 RX ORDER — ALBUTEROL SULFATE 2.5 MG/3ML
(2.5 MG/3ML) SOLUTION RESPIRATORY (INHALATION)
Qty: 30 | Refills: 0 | Status: ACTIVE | COMMUNITY
Start: 2023-10-17 | End: 1900-01-01

## 2024-02-21 ENCOUNTER — RX RENEWAL (OUTPATIENT)
Age: 74
End: 2024-02-21

## 2024-02-21 RX ORDER — MEGESTROL ACETATE 40 MG/1
40 TABLET ORAL TWICE DAILY
Qty: 360 | Refills: 3 | Status: ACTIVE | COMMUNITY
Start: 2021-08-19 | End: 1900-01-01

## 2024-02-21 RX ORDER — NYSTATIN 100000 1/G
100000 POWDER TOPICAL 3 TIMES DAILY
Qty: 1 | Refills: 3 | Status: ACTIVE | COMMUNITY
Start: 2024-02-20

## 2024-02-22 ENCOUNTER — RX RENEWAL (OUTPATIENT)
Age: 74
End: 2024-02-22

## 2024-02-22 RX ORDER — EZETIMIBE 10 MG/1
10 TABLET ORAL
Qty: 90 | Refills: 3 | Status: ACTIVE | COMMUNITY
Start: 2022-09-14 | End: 1900-01-01

## 2024-02-29 ENCOUNTER — APPOINTMENT (OUTPATIENT)
Dept: FAMILY MEDICINE | Facility: CLINIC | Age: 74
End: 2024-02-29
Payer: MEDICARE

## 2024-02-29 DIAGNOSIS — K59.09 OTHER CONSTIPATION: ICD-10-CM

## 2024-02-29 DIAGNOSIS — N95.0 POSTMENOPAUSAL BLEEDING: ICD-10-CM

## 2024-02-29 PROCEDURE — 99442: CPT

## 2024-02-29 RX ORDER — LOPERAMIDE HYDROCHLORIDE 2 MG/1
2 CAPSULE ORAL
Qty: 30 | Refills: 1 | Status: ACTIVE | COMMUNITY
Start: 2023-09-21 | End: 1900-01-01

## 2024-03-01 ENCOUNTER — APPOINTMENT (OUTPATIENT)
Dept: GYNECOLOGIC ONCOLOGY | Facility: CLINIC | Age: 74
End: 2024-03-01
Payer: MEDICARE

## 2024-03-01 PROCEDURE — 99441: CPT | Mod: 93

## 2024-03-05 ENCOUNTER — APPOINTMENT (OUTPATIENT)
Dept: FAMILY MEDICINE | Facility: CLINIC | Age: 74
End: 2024-03-05

## 2024-03-06 ENCOUNTER — RX RENEWAL (OUTPATIENT)
Age: 74
End: 2024-03-06

## 2024-03-12 ENCOUNTER — APPOINTMENT (OUTPATIENT)
Dept: FAMILY MEDICINE | Facility: CLINIC | Age: 74
End: 2024-03-12

## 2024-03-15 RX ORDER — GABAPENTIN 100 MG/1
100 CAPSULE ORAL
Qty: 120 | Refills: 2 | Status: ACTIVE | COMMUNITY
Start: 2022-09-21 | End: 1900-01-01

## 2024-03-22 NOTE — REASON FOR VISIT
[FreeTextEntry1] : [facility]. [phone] [address] [place] [address]

## 2024-03-22 NOTE — HISTORY OF PRESENT ILLNESS
[FreeTextEntry1] : This 72 yo with PMB on Megace empirically and is a poor surgical candidate. She has not been seen since 11/22 and has not had follow up biopsy. Patient was seen via [facility] on 3/1/24. She reports being compliant. Recommend repeat biopsy in office. Patient was advised to double her Megace until her appt.

## 2024-03-25 ENCOUNTER — APPOINTMENT (OUTPATIENT)
Dept: GYNECOLOGIC ONCOLOGY | Facility: CLINIC | Age: 74
End: 2024-03-25

## 2024-03-27 ENCOUNTER — APPOINTMENT (OUTPATIENT)
Dept: CARDIOLOGY | Facility: CLINIC | Age: 74
End: 2024-03-27
Payer: MEDICARE

## 2024-03-27 ENCOUNTER — NON-APPOINTMENT (OUTPATIENT)
Age: 74
End: 2024-03-27

## 2024-03-27 ENCOUNTER — APPOINTMENT (OUTPATIENT)
Dept: FAMILY MEDICINE | Facility: CLINIC | Age: 74
End: 2024-03-27
Payer: MEDICARE

## 2024-03-27 VITALS
SYSTOLIC BLOOD PRESSURE: 110 MMHG | HEIGHT: 60 IN | WEIGHT: 180 LBS | DIASTOLIC BLOOD PRESSURE: 82 MMHG | HEART RATE: 96 BPM | BODY MASS INDEX: 35.34 KG/M2 | OXYGEN SATURATION: 98 %

## 2024-03-27 VITALS
DIASTOLIC BLOOD PRESSURE: 82 MMHG | TEMPERATURE: 97.2 F | HEIGHT: 60 IN | OXYGEN SATURATION: 97 % | BODY MASS INDEX: 35.34 KG/M2 | WEIGHT: 180 LBS | HEART RATE: 96 BPM | SYSTOLIC BLOOD PRESSURE: 130 MMHG

## 2024-03-27 DIAGNOSIS — I48.91 UNSPECIFIED ATRIAL FIBRILLATION: ICD-10-CM

## 2024-03-27 DIAGNOSIS — J45.909 UNSPECIFIED ASTHMA, UNCOMPLICATED: ICD-10-CM

## 2024-03-27 DIAGNOSIS — E66.9 OBESITY, UNSPECIFIED: ICD-10-CM

## 2024-03-27 DIAGNOSIS — Z77.22 CONTACT WITH AND (SUSPECTED) EXPOSURE TO ENVIRONMENTAL TOBACCO SMOKE (ACUTE) (CHRONIC): ICD-10-CM

## 2024-03-27 DIAGNOSIS — G14 POSTPOLIO SYNDROME: ICD-10-CM

## 2024-03-27 DIAGNOSIS — R53.1 WEAKNESS: ICD-10-CM

## 2024-03-27 DIAGNOSIS — Z13.31 ENCOUNTER FOR SCREENING FOR DEPRESSION: ICD-10-CM

## 2024-03-27 DIAGNOSIS — I50.30 UNSPECIFIED DIASTOLIC (CONGESTIVE) HEART FAILURE: ICD-10-CM

## 2024-03-27 DIAGNOSIS — R26.89 OTHER ABNORMALITIES OF GAIT AND MOBILITY: ICD-10-CM

## 2024-03-27 DIAGNOSIS — I10 ESSENTIAL (PRIMARY) HYPERTENSION: ICD-10-CM

## 2024-03-27 PROCEDURE — 99214 OFFICE O/P EST MOD 30 MIN: CPT | Mod: 25

## 2024-03-27 PROCEDURE — 93246 EXT ECG>7D<15D RECORDING: CPT

## 2024-03-27 PROCEDURE — 99214 OFFICE O/P EST MOD 30 MIN: CPT

## 2024-03-27 PROCEDURE — 93000 ELECTROCARDIOGRAM COMPLETE: CPT | Mod: XU

## 2024-03-27 PROCEDURE — 99204 OFFICE O/P NEW MOD 45 MIN: CPT | Mod: 25

## 2024-03-27 PROCEDURE — G0444 DEPRESSION SCREEN ANNUAL: CPT | Mod: 59

## 2024-03-27 RX ORDER — BUDESONIDE AND FORMOTEROL FUMARATE DIHYDRATE 160; 4.5 UG/1; UG/1
160-4.5 AEROSOL RESPIRATORY (INHALATION)
Qty: 10 | Refills: 0 | Status: COMPLETED | COMMUNITY
Start: 2024-02-13

## 2024-03-27 RX ORDER — MONTELUKAST 10 MG/1
10 TABLET, FILM COATED ORAL
Qty: 90 | Refills: 0 | Status: ACTIVE | COMMUNITY
Start: 2024-03-27

## 2024-03-27 RX ORDER — LISINOPRIL 2.5 MG/1
2.5 TABLET ORAL
Qty: 90 | Refills: 2 | Status: ACTIVE | COMMUNITY
Start: 2024-03-27

## 2024-03-27 RX ORDER — PREDNISONE 10 MG/1
10 TABLET ORAL
Qty: 30 | Refills: 0 | Status: COMPLETED | COMMUNITY
Start: 2024-02-13

## 2024-03-27 RX ORDER — BUDESONIDE AND FORMOTEROL FUMARATE DIHYDRATE 160; 4.5 UG/1; UG/1
160-4.5 AEROSOL RESPIRATORY (INHALATION) TWICE DAILY
Qty: 1 | Refills: 2 | Status: ACTIVE | COMMUNITY
Start: 2024-03-27

## 2024-03-27 RX ORDER — MONTELUKAST 10 MG/1
10 TABLET, FILM COATED ORAL
Qty: 30 | Refills: 0 | Status: COMPLETED | COMMUNITY
Start: 2024-02-13

## 2024-03-27 RX ORDER — AZITHROMYCIN 250 MG/1
250 TABLET, FILM COATED ORAL
Qty: 1 | Refills: 0 | Status: DISCONTINUED | COMMUNITY
Start: 2023-10-17 | End: 2024-03-27

## 2024-03-27 NOTE — HISTORY OF PRESENT ILLNESS
[FreeTextEntry1] : Follow up  [de-identified] : 73 year old female (wheelchair bound) presents for a follow up   She was hospitalized at [facility] from 2/10/24-2/13/24 for an asthma exacerbation she states that she was advised that she may have underlying COPD- reports exposure to second hand smoke will be looking to schedule an appointment with pulmonology feeling better- on Symbicort, Montelukast, Albuterol PRN  she has been receiving services from [employer] has home physical therapy twice a week has an aide 1 day a week for a short while has visiting nurse about 3-4 days a week  has a walker to use at home for short distances in her home  seen by cardiology earlier today- has HTN, Diastolic CHF, PAF, h/o CVA, Hyperlipidemia  reports she will be stopping Amlodipine and starting Lisinopril currently wearing a Holter monitor (will need to wear for 2 weeks) continues to be on Clonidine, Metoprolol, Furosemide   Has Hypothyroidism- on Levothyroxine 88 mcg daily  Has gastritis, GERD on Pantoprazole  History of a stroke in 2008 reports having residual right sided weakness on Plavix  History of poliomyelitis during infancy has resulted in pain and weakness to her legs reports right hand gets numb as well  she has a uterine mass follows with gynecology oncology on [facility]  patient will be having a consultation with a physician who does home visits next week- will see if the physician will take her on as a patient

## 2024-03-27 NOTE — HISTORY OF PRESENT ILLNESS
[FreeTextEntry1] : Pt is a 74 y/o F who is referred here today by their PCP for evaluation.  Pt has PMH PAF unclear why she is not on AC, HFpEF, HTN, HLD, CVA 2009 with residual R sided weakness (pt reports that she was told she had carotid disease at the time but there was no intervention done), polio, uterine mass following with gyn onc, asthma pt ambulates with wheelchair/walker Today she tells me that she feels MATTHEWS.  She also notes occasional dizziness.  She denies CP, diaphoresis, palpitations, syncope, LE edema, PND, orthopnea. She does not report any falls   TTE 04/2021 EF 64%, mild LVH, trivial TR   Previous hospitalizations:  Previous surgeries: - no problems with anesthesia Family hx: no SCD Smoking status: never social ETOH no drug use Current exercise: works with PT Daily water intake: 50 oz Daily caffeine intake: none OTC medications: MVI

## 2024-03-27 NOTE — PHYSICAL EXAM
[No Acute Distress] : no acute distress [Well Nourished] : well nourished [Well Developed] : well developed [Normal Appearance] : was normal in appearance [Neck Supple] : was supple [No Respiratory Distress] : no respiratory distress  [Clear to Auscultation] : lungs were clear to auscultation bilaterally [Normal Rate] : normal rate  [Regular Rhythm] : with a regular rhythm [Normal S1, S2] : normal S1 and S2 [No Edema] : there was no peripheral edema [Soft] : abdomen soft [Obese] : obese [Alert and Oriented x3] : oriented to person, place, and time [de-identified] : sitting in a wheelchair

## 2024-03-27 NOTE — DATA REVIEWED
[FreeTextEntry1] : Blood work  2/10/24 Total cholesterol 146 Triglycerides 118 HDL 40 LDL 85  2/12/24 WBC 9.21 H/H 13.6/40.6 Platelets 467  Sodium 134 Potassium 4.2 BUN 17 Creatinine 0.85 eGFR 72  AST 13 ALT 19

## 2024-03-27 NOTE — REVIEW OF SYSTEMS
[Dyspnea on Exertion] : dyspnea on exertion [Fever] : no fever [Chills] : no chills [Chest Pain] : no chest pain [Cough] : no cough [Abdominal Pain] : no abdominal pain

## 2024-03-27 NOTE — HEALTH RISK ASSESSMENT
[Never] : Never [0] : 2) Feeling down, depressed, or hopeless: Not at all (0) [PHQ-2 Negative - No further assessment needed] : PHQ-2 Negative - No further assessment needed [[id number]] : 0

## 2024-03-27 NOTE — ASSESSMENT
[FreeTextEntry1] : HTN - stable - Amlodipine d/c by cardiology, patient to start Lisinopril  - will c/w Metoprolol, Furosemide, Clonidine in addition to Lisinopril   Dyslipidemia - stable - c/w Simvastatin, Zetia  Prediabetes - last A1C 6.1 from Jan 2024  - c/w diet   Hypothyroidism - stable - c/w Levothyroxine  Diastolic CHF - c/w Furosemide - seen by cardiology today - referred for testing   History of CVA - on Plavix - seen by cardiology today, referred to neurology   Asthma - c/w Symbicort, Montelukast, Albuterol PRN - advised to f/u with pulmonology   GERD - c/w Pantoprazole   Uterine mass - following with GYN oncology - on Megace   Obesity - focus on diet  Post-polio syndrome/balance disorder - c/w home physical therapy   declined updated blood work today- will check at next office visit  recommend f/u in 3 months

## 2024-03-27 NOTE — DISCUSSION/SUMMARY
[EKG obtained to assist in diagnosis and management of assessed problem(s)] : EKG obtained to assist in diagnosis and management of assessed problem(s) [FreeTextEntry1] : Pt is a 72 y/o F PMH PAF unclear why she is not on AC, HFpEF, HTN, HLD, CVA 2009 with residual R sided weakness (pt reports that she was told she had carotid disease at the time but there was no intervention done), polio, uterine mass following with gyn onc, asthma  HFpEF: she notes baseline swelling to her feet which has not worsened pt appears euvolemic overall c/w lasix 40mg qd advised low salt diet, daily weights although she mentions that she cannot really weigh herself c/w metoprolol succ stop amlodipine and start lisinopril  PAF: She tells me that she was diagnosed in 2016 but has never been on AC, unclear about diagnosis previous shukla 2021 shows NSR rate control with metoprolol succ 200mg qd she denies recent falls AC would be indicated for a true diagnosis of AF - c/w ASA and plavix for now (unclear why she on DAPT) check shukla refer to EP  HTN: well controlled stop amlodipine and start lisinopril for HF diagnosis c/w lasix 40mg qd c/w clonidine 0.2mg tid c/w metoprolol succ 200mg qd Discussed the long-term health risks of uncontrolled BP.  Advised low salt diet, regular exercise, maintaining ideal weight. Encouraged pt to check BP at home and keep journal   HLD: c/w statin c/w zetia goal LDL <70 Advised lifestyle modifications   CVA: unclear etiology - ?AF ?ADRIÁN c/w ASA and plavix check CUS refer to neuro  Pt will return in 4-6 mnths or sooner as needed but I encouraged communication via phone or portal if necessary.  Most recent available lab results were reviewed with pt. The described plan was discussed with the pt.  All questions and concerns were addressed to the best of my knowledge.

## 2024-04-11 ENCOUNTER — RX RENEWAL (OUTPATIENT)
Age: 74
End: 2024-04-11

## 2024-04-12 RX ORDER — LEVOTHYROXINE SODIUM 0.09 MG/1
88 TABLET ORAL
Qty: 90 | Refills: 1 | Status: ACTIVE | COMMUNITY
Start: 2022-10-28 | End: 1900-01-01

## 2024-04-15 ENCOUNTER — RX RENEWAL (OUTPATIENT)
Age: 74
End: 2024-04-15

## 2024-04-15 RX ORDER — SENNOSIDES 8.6 MG TABLETS 8.6 MG/1
8.6 TABLET ORAL DAILY
Qty: 90 | Refills: 3 | Status: ACTIVE | COMMUNITY
Start: 2023-05-04 | End: 1900-01-01

## 2024-04-17 PROCEDURE — 93248 EXT ECG>7D<15D REV&INTERPJ: CPT

## 2024-04-17 RX ORDER — AMITRIPTYLINE HYDROCHLORIDE 50 MG/1
50 TABLET, FILM COATED ORAL
Qty: 90 | Refills: 0 | Status: ACTIVE | COMMUNITY
Start: 1900-01-01 | End: 1900-01-01

## 2024-04-29 ENCOUNTER — RX RENEWAL (OUTPATIENT)
Age: 74
End: 2024-04-29

## 2024-04-29 RX ORDER — AMLODIPINE BESYLATE 2.5 MG/1
2.5 TABLET ORAL
Qty: 90 | Refills: 1 | Status: ACTIVE | COMMUNITY
Start: 2022-08-29 | End: 1900-01-01

## 2024-05-06 ENCOUNTER — RX RENEWAL (OUTPATIENT)
Age: 74
End: 2024-05-06

## 2024-05-06 RX ORDER — SIMVASTATIN 20 MG/1
20 TABLET, FILM COATED ORAL
Qty: 90 | Refills: 0 | Status: ACTIVE | COMMUNITY
Start: 2024-02-22 | End: 1900-01-01

## 2024-05-06 RX ORDER — CLONIDINE HYDROCHLORIDE 0.2 MG/1
0.2 TABLET ORAL
Qty: 270 | Refills: 0 | Status: ACTIVE | COMMUNITY
Start: 2023-02-28 | End: 1900-01-01

## 2024-05-20 ENCOUNTER — RX RENEWAL (OUTPATIENT)
Age: 74
End: 2024-05-20

## 2024-05-20 RX ORDER — FUROSEMIDE 40 MG/1
40 TABLET ORAL
Qty: 90 | Refills: 0 | Status: ACTIVE | COMMUNITY
Start: 2023-07-19 | End: 1900-01-01

## 2024-05-23 ENCOUNTER — APPOINTMENT (OUTPATIENT)
Dept: CARDIOLOGY | Facility: CLINIC | Age: 74
End: 2024-05-23

## 2024-05-24 ENCOUNTER — TRANSCRIPTION ENCOUNTER (OUTPATIENT)
Age: 74
End: 2024-05-24

## 2024-05-29 ENCOUNTER — RX RENEWAL (OUTPATIENT)
Age: 74
End: 2024-05-29

## 2024-05-29 RX ORDER — CLOPIDOGREL BISULFATE 75 MG/1
75 TABLET, FILM COATED ORAL
Qty: 90 | Refills: 0 | Status: ACTIVE | COMMUNITY
Start: 2023-02-28 | End: 1900-01-01

## 2024-06-07 ENCOUNTER — APPOINTMENT (OUTPATIENT)
Dept: CARDIOLOGY | Facility: CLINIC | Age: 74
End: 2024-06-07

## 2024-06-18 RX ORDER — POTASSIUM CHLORIDE 750 MG/1
10 CAPSULE, EXTENDED RELEASE ORAL DAILY
Qty: 180 | Refills: 0 | Status: ACTIVE | COMMUNITY
Start: 2023-10-03 | End: 1900-01-01

## 2024-06-21 ENCOUNTER — APPOINTMENT (OUTPATIENT)
Dept: FAMILY MEDICINE | Facility: CLINIC | Age: 74
End: 2024-06-21
Payer: MEDICARE

## 2024-06-21 DIAGNOSIS — R19.7 DIARRHEA, UNSPECIFIED: ICD-10-CM

## 2024-06-21 DIAGNOSIS — E78.5 HYPERLIPIDEMIA, UNSPECIFIED: ICD-10-CM

## 2024-06-21 DIAGNOSIS — E03.9 HYPOTHYROIDISM, UNSPECIFIED: ICD-10-CM

## 2024-06-21 DIAGNOSIS — R73.03 PREDIABETES.: ICD-10-CM

## 2024-06-21 PROCEDURE — 99442: CPT

## 2024-06-24 ENCOUNTER — LABORATORY RESULT (OUTPATIENT)
Age: 74
End: 2024-06-24

## 2024-06-25 ENCOUNTER — LABORATORY RESULT (OUTPATIENT)
Age: 74
End: 2024-06-25

## 2024-06-26 ENCOUNTER — LABORATORY RESULT (OUTPATIENT)
Age: 74
End: 2024-06-26

## 2024-07-06 ENCOUNTER — NON-APPOINTMENT (OUTPATIENT)
Age: 74
End: 2024-07-06

## 2024-07-08 ENCOUNTER — TRANSCRIPTION ENCOUNTER (OUTPATIENT)
Age: 74
End: 2024-07-08

## 2024-07-11 ENCOUNTER — NON-APPOINTMENT (OUTPATIENT)
Age: 74
End: 2024-07-11

## 2024-07-16 NOTE — DISCHARGE NOTE PROVIDER - NSDCHHBASESERVICE_GEN_ALL_CORE
Vss afeb  Neuro intact  Pain controlled  Leg pain improved, some residual pain on lft  Drain output higher last night, appears dry now.  Will ose txa  DC drain prior to discharge  Hgb 7.5, vital stable, asymptomatic, blood loss anemia and hemodilution  Plan  TXA  PT OT  DC home   Fu 2 weeks       Nursing/Physical therapy

## 2024-07-18 ENCOUNTER — RX RENEWAL (OUTPATIENT)
Age: 74
End: 2024-07-18

## 2024-07-29 ENCOUNTER — APPOINTMENT (OUTPATIENT)
Dept: FAMILY MEDICINE | Facility: CLINIC | Age: 74
End: 2024-07-29
Payer: MEDICARE

## 2024-07-29 DIAGNOSIS — U07.1 COVID-19: ICD-10-CM

## 2024-07-29 PROCEDURE — 99442: CPT

## 2024-07-29 RX ORDER — BENZONATATE 200 MG/1
200 CAPSULE ORAL 3 TIMES DAILY
Qty: 30 | Refills: 0 | Status: ACTIVE | COMMUNITY
Start: 2024-07-29 | End: 1900-01-01

## 2024-07-29 RX ORDER — MOLNUPIRAVIR 200 MG/1
200 CAPSULE ORAL TWICE DAILY
Qty: 40 | Refills: 0 | Status: ACTIVE | COMMUNITY
Start: 2024-07-29 | End: 1900-01-01

## 2024-07-31 ENCOUNTER — NON-APPOINTMENT (OUTPATIENT)
Age: 74
End: 2024-07-31

## 2024-08-05 ENCOUNTER — RX RENEWAL (OUTPATIENT)
Age: 74
End: 2024-08-05

## 2024-08-05 ENCOUNTER — NON-APPOINTMENT (OUTPATIENT)
Age: 74
End: 2024-08-05

## 2024-08-14 ENCOUNTER — RX RENEWAL (OUTPATIENT)
Age: 74
End: 2024-08-14

## 2024-09-05 ENCOUNTER — RX RENEWAL (OUTPATIENT)
Age: 74
End: 2024-09-05

## 2024-09-14 ENCOUNTER — NON-APPOINTMENT (OUTPATIENT)
Age: 74
End: 2024-09-14

## 2024-09-19 ENCOUNTER — APPOINTMENT (OUTPATIENT)
Dept: CARDIOLOGY | Facility: CLINIC | Age: 74
End: 2024-09-19

## 2024-09-27 ENCOUNTER — NON-APPOINTMENT (OUTPATIENT)
Age: 74
End: 2024-09-27

## 2024-09-27 ENCOUNTER — APPOINTMENT (OUTPATIENT)
Dept: CARDIOLOGY | Facility: CLINIC | Age: 74
End: 2024-09-27

## 2024-09-30 ENCOUNTER — RX RENEWAL (OUTPATIENT)
Age: 74
End: 2024-09-30

## 2024-10-01 ENCOUNTER — APPOINTMENT (OUTPATIENT)
Dept: FAMILY MEDICINE | Facility: CLINIC | Age: 74
End: 2024-10-01

## 2024-10-02 ENCOUNTER — RX RENEWAL (OUTPATIENT)
Age: 74
End: 2024-10-02

## 2024-11-01 ENCOUNTER — TRANSCRIPTION ENCOUNTER (OUTPATIENT)
Age: 74
End: 2024-11-01

## 2024-11-06 ENCOUNTER — RX RENEWAL (OUTPATIENT)
Age: 74
End: 2024-11-06

## 2024-11-06 NOTE — H&P PST ADULT - ANESTHESIA, PREVIOUS REACTION, PROFILE
GERRY   This patient was contacted for the second time and a call was placed.  The call did not go through because of calling restrictions placed on the line and was unable to leave a message.   unknown

## 2024-11-22 DIAGNOSIS — R39.89 OTHER SYMPTOMS AND SIGNS INVOLVING THE GENITOURINARY SYSTEM: ICD-10-CM

## 2024-11-22 RX ORDER — NITROFURANTOIN (MONOHYDRATE/MACROCRYSTALS) 25; 75 MG/1; MG/1
100 CAPSULE ORAL
Qty: 10 | Refills: 0 | Status: ACTIVE | COMMUNITY
Start: 2024-11-22 | End: 1900-01-01

## 2024-11-25 ENCOUNTER — APPOINTMENT (OUTPATIENT)
Dept: FAMILY MEDICINE | Facility: CLINIC | Age: 74
End: 2024-11-25

## 2024-12-10 ENCOUNTER — APPOINTMENT (OUTPATIENT)
Dept: ELECTROPHYSIOLOGY | Facility: CLINIC | Age: 74
End: 2024-12-10

## 2024-12-12 ENCOUNTER — RX RENEWAL (OUTPATIENT)
Age: 74
End: 2024-12-12

## 2024-12-17 ENCOUNTER — RX RENEWAL (OUTPATIENT)
Age: 74
End: 2024-12-17

## 2025-01-10 ENCOUNTER — APPOINTMENT (OUTPATIENT)
Dept: GYNECOLOGIC ONCOLOGY | Facility: CLINIC | Age: 75
End: 2025-01-10
Payer: MEDICARE

## 2025-01-10 PROCEDURE — 99214 OFFICE O/P EST MOD 30 MIN: CPT | Mod: 95

## 2025-01-28 NOTE — ED PROVIDER NOTE - GASTROINTESTINAL NEGATIVE STATEMENT, MLM
no abdominal pain, no bloating, no constipation, no diarrhea, no nausea and no vomiting. (1) Oriented to own ability

## 2025-04-02 ENCOUNTER — TRANSCRIPTION ENCOUNTER (OUTPATIENT)
Age: 75
End: 2025-04-02

## 2025-04-08 ENCOUNTER — TRANSCRIPTION ENCOUNTER (OUTPATIENT)
Age: 75
End: 2025-04-08

## 2025-04-11 ENCOUNTER — APPOINTMENT (OUTPATIENT)
Dept: GYNECOLOGIC ONCOLOGY | Facility: CLINIC | Age: 75
End: 2025-04-11

## 2025-04-11 PROCEDURE — ZZZZZ: CPT

## 2025-09-18 ENCOUNTER — RX RENEWAL (OUTPATIENT)
Age: 75
End: 2025-09-18